# Patient Record
Sex: FEMALE | Race: WHITE | NOT HISPANIC OR LATINO
[De-identification: names, ages, dates, MRNs, and addresses within clinical notes are randomized per-mention and may not be internally consistent; named-entity substitution may affect disease eponyms.]

---

## 2017-10-31 PROBLEM — Z00.00 ENCOUNTER FOR PREVENTIVE HEALTH EXAMINATION: Status: ACTIVE | Noted: 2017-10-31

## 2017-11-17 ENCOUNTER — APPOINTMENT (OUTPATIENT)
Dept: UROGYNECOLOGY | Facility: CLINIC | Age: 82
End: 2017-11-17

## 2018-12-14 ENCOUNTER — INPATIENT (INPATIENT)
Facility: HOSPITAL | Age: 83
LOS: 3 days | Discharge: HOME | DRG: 64 | End: 2018-12-18
Attending: GENERAL ACUTE CARE HOSPITAL | Admitting: INTERNAL MEDICINE
Payer: MEDICARE

## 2018-12-14 VITALS
SYSTOLIC BLOOD PRESSURE: 193 MMHG | RESPIRATION RATE: 20 BRPM | HEIGHT: 61 IN | TEMPERATURE: 98 F | HEART RATE: 60 BPM | WEIGHT: 130.07 LBS | DIASTOLIC BLOOD PRESSURE: 83 MMHG | OXYGEN SATURATION: 96 %

## 2018-12-14 DIAGNOSIS — B34.2 CORONAVIRUS INFECTION, UNSPECIFIED: ICD-10-CM

## 2018-12-14 DIAGNOSIS — H35.30 UNSPECIFIED MACULAR DEGENERATION: ICD-10-CM

## 2018-12-14 DIAGNOSIS — R41.0 DISORIENTATION, UNSPECIFIED: ICD-10-CM

## 2018-12-14 DIAGNOSIS — K25.9 GASTRIC ULCER, UNSPECIFIED AS ACUTE OR CHRONIC, WITHOUT HEMORRHAGE OR PERFORATION: ICD-10-CM

## 2018-12-14 DIAGNOSIS — Z90.49 ACQUIRED ABSENCE OF OTHER SPECIFIED PARTS OF DIGESTIVE TRACT: Chronic | ICD-10-CM

## 2018-12-14 DIAGNOSIS — I10 ESSENTIAL (PRIMARY) HYPERTENSION: ICD-10-CM

## 2018-12-14 DIAGNOSIS — Z79.899 OTHER LONG TERM (CURRENT) DRUG THERAPY: ICD-10-CM

## 2018-12-14 DIAGNOSIS — Z90.710 ACQUIRED ABSENCE OF BOTH CERVIX AND UTERUS: Chronic | ICD-10-CM

## 2018-12-14 LAB
ALBUMIN SERPL ELPH-MCNC: 4.1 G/DL — SIGNIFICANT CHANGE UP (ref 3.3–5)
ALP SERPL-CCNC: 158 U/L — HIGH (ref 40–120)
ALT FLD-CCNC: 16 U/L — SIGNIFICANT CHANGE UP (ref 10–45)
ANION GAP SERPL CALC-SCNC: 13 MMOL/L — SIGNIFICANT CHANGE UP (ref 5–17)
APPEARANCE UR: ABNORMAL
APTT BLD: 30.1 SEC — SIGNIFICANT CHANGE UP (ref 27.5–36.3)
AST SERPL-CCNC: 18 U/L — SIGNIFICANT CHANGE UP (ref 10–40)
BASOPHILS # BLD AUTO: 0.1 K/UL — SIGNIFICANT CHANGE UP (ref 0–0.2)
BASOPHILS NFR BLD AUTO: 0.5 % — SIGNIFICANT CHANGE UP (ref 0–2)
BILIRUB SERPL-MCNC: 0.4 MG/DL — SIGNIFICANT CHANGE UP (ref 0.2–1.2)
BILIRUB UR-MCNC: NEGATIVE — SIGNIFICANT CHANGE UP
BUN SERPL-MCNC: 11 MG/DL — SIGNIFICANT CHANGE UP (ref 7–23)
CALCIUM SERPL-MCNC: 9.4 MG/DL — SIGNIFICANT CHANGE UP (ref 8.4–10.5)
CHLORIDE SERPL-SCNC: 94 MMOL/L — LOW (ref 96–108)
CO2 SERPL-SCNC: 29 MMOL/L — SIGNIFICANT CHANGE UP (ref 22–31)
COLOR SPEC: YELLOW — SIGNIFICANT CHANGE UP
CREAT SERPL-MCNC: 0.46 MG/DL — LOW (ref 0.5–1.3)
DIFF PNL FLD: NEGATIVE — SIGNIFICANT CHANGE UP
EOSINOPHIL # BLD AUTO: 0 K/UL — SIGNIFICANT CHANGE UP (ref 0–0.5)
EOSINOPHIL NFR BLD AUTO: 0.2 % — SIGNIFICANT CHANGE UP (ref 0–6)
GLUCOSE SERPL-MCNC: 130 MG/DL — HIGH (ref 70–99)
GLUCOSE UR QL: NEGATIVE — SIGNIFICANT CHANGE UP
HCOV 229E RNA SPEC QL NAA+PROBE: DETECTED
HCOV PNL SPEC NAA+PROBE: DETECTED
HCT VFR BLD CALC: 41.5 % — SIGNIFICANT CHANGE UP (ref 34.5–45)
HGB BLD-MCNC: 13.7 G/DL — SIGNIFICANT CHANGE UP (ref 11.5–15.5)
INR BLD: 1.14 RATIO — SIGNIFICANT CHANGE UP (ref 0.88–1.16)
KETONES UR-MCNC: NEGATIVE — SIGNIFICANT CHANGE UP
LACTATE BLDV-MCNC: 2.2 MMOL/L — HIGH (ref 0.7–2)
LEUKOCYTE ESTERASE UR-ACNC: NEGATIVE — SIGNIFICANT CHANGE UP
LYMPHOCYTES # BLD AUTO: 1.3 K/UL — SIGNIFICANT CHANGE UP (ref 1–3.3)
LYMPHOCYTES # BLD AUTO: 10.3 % — LOW (ref 13–44)
MCHC RBC-ENTMCNC: 25.8 PG — LOW (ref 27–34)
MCHC RBC-ENTMCNC: 33.1 GM/DL — SIGNIFICANT CHANGE UP (ref 32–36)
MCV RBC AUTO: 78.1 FL — LOW (ref 80–100)
MONOCYTES # BLD AUTO: 0.3 K/UL — SIGNIFICANT CHANGE UP (ref 0–0.9)
MONOCYTES NFR BLD AUTO: 2.6 % — SIGNIFICANT CHANGE UP (ref 2–14)
NEUTROPHILS # BLD AUTO: 10.8 K/UL — HIGH (ref 1.8–7.4)
NEUTROPHILS NFR BLD AUTO: 86.5 % — HIGH (ref 43–77)
NITRITE UR-MCNC: NEGATIVE — SIGNIFICANT CHANGE UP
PH UR: 7 — SIGNIFICANT CHANGE UP (ref 5–8)
PLATELET # BLD AUTO: 324 K/UL — SIGNIFICANT CHANGE UP (ref 150–400)
POTASSIUM SERPL-MCNC: 3.4 MMOL/L — LOW (ref 3.5–5.3)
POTASSIUM SERPL-SCNC: 3.4 MMOL/L — LOW (ref 3.5–5.3)
PROT SERPL-MCNC: 7.3 G/DL — SIGNIFICANT CHANGE UP (ref 6–8.3)
PROT UR-MCNC: ABNORMAL
PROTHROM AB SERPL-ACNC: 13.2 SEC — HIGH (ref 10–12.9)
RAPID RVP RESULT: DETECTED
RBC # BLD: 5.32 M/UL — HIGH (ref 3.8–5.2)
RBC # FLD: 14 % — SIGNIFICANT CHANGE UP (ref 10.3–14.5)
SODIUM SERPL-SCNC: 136 MMOL/L — SIGNIFICANT CHANGE UP (ref 135–145)
SP GR SPEC: 1.02 — SIGNIFICANT CHANGE UP (ref 1.01–1.02)
UROBILINOGEN FLD QL: NEGATIVE — SIGNIFICANT CHANGE UP
WBC # BLD: 12.5 K/UL — HIGH (ref 3.8–10.5)
WBC # FLD AUTO: 12.5 K/UL — HIGH (ref 3.8–10.5)

## 2018-12-14 PROCEDURE — 71250 CT THORAX DX C-: CPT | Mod: 26

## 2018-12-14 PROCEDURE — 71045 X-RAY EXAM CHEST 1 VIEW: CPT | Mod: 26

## 2018-12-14 PROCEDURE — 99285 EMERGENCY DEPT VISIT HI MDM: CPT | Mod: GC

## 2018-12-14 PROCEDURE — 99223 1ST HOSP IP/OBS HIGH 75: CPT | Mod: AI

## 2018-12-14 PROCEDURE — 70450 CT HEAD/BRAIN W/O DYE: CPT | Mod: 26

## 2018-12-14 RX ORDER — PANTOPRAZOLE SODIUM 20 MG/1
40 TABLET, DELAYED RELEASE ORAL
Qty: 0 | Refills: 0 | Status: DISCONTINUED | OUTPATIENT
Start: 2018-12-14 | End: 2018-12-18

## 2018-12-14 RX ORDER — ACETAMINOPHEN 500 MG
1000 TABLET ORAL ONCE
Qty: 0 | Refills: 0 | Status: COMPLETED | OUTPATIENT
Start: 2018-12-14 | End: 2018-12-14

## 2018-12-14 RX ORDER — POTASSIUM CHLORIDE 20 MEQ
40 PACKET (EA) ORAL ONCE
Qty: 0 | Refills: 0 | Status: COMPLETED | OUTPATIENT
Start: 2018-12-14 | End: 2018-12-14

## 2018-12-14 RX ORDER — CEFTRIAXONE 500 MG/1
1 INJECTION, POWDER, FOR SOLUTION INTRAMUSCULAR; INTRAVENOUS ONCE
Qty: 0 | Refills: 0 | Status: COMPLETED | OUTPATIENT
Start: 2018-12-14 | End: 2018-12-14

## 2018-12-14 RX ORDER — METOPROLOL TARTRATE 50 MG
50 TABLET ORAL DAILY
Qty: 0 | Refills: 0 | Status: DISCONTINUED | OUTPATIENT
Start: 2018-12-14 | End: 2018-12-18

## 2018-12-14 RX ORDER — SODIUM CHLORIDE 9 MG/ML
1850 INJECTION INTRAMUSCULAR; INTRAVENOUS; SUBCUTANEOUS ONCE
Qty: 0 | Refills: 0 | Status: COMPLETED | OUTPATIENT
Start: 2018-12-14 | End: 2018-12-14

## 2018-12-14 RX ORDER — HEPARIN SODIUM 5000 [USP'U]/ML
5000 INJECTION INTRAVENOUS; SUBCUTANEOUS EVERY 12 HOURS
Qty: 0 | Refills: 0 | Status: DISCONTINUED | OUTPATIENT
Start: 2018-12-14 | End: 2018-12-18

## 2018-12-14 RX ORDER — ACETAMINOPHEN 500 MG
650 TABLET ORAL EVERY 6 HOURS
Qty: 0 | Refills: 0 | Status: DISCONTINUED | OUTPATIENT
Start: 2018-12-14 | End: 2018-12-18

## 2018-12-14 RX ADMIN — SODIUM CHLORIDE 1850 MILLILITER(S): 9 INJECTION INTRAMUSCULAR; INTRAVENOUS; SUBCUTANEOUS at 20:11

## 2018-12-14 RX ADMIN — HEPARIN SODIUM 5000 UNIT(S): 5000 INJECTION INTRAVENOUS; SUBCUTANEOUS at 23:58

## 2018-12-14 RX ADMIN — CEFTRIAXONE 1 GRAM(S): 500 INJECTION, POWDER, FOR SOLUTION INTRAMUSCULAR; INTRAVENOUS at 19:43

## 2018-12-14 RX ADMIN — Medication 1000 MILLIGRAM(S): at 21:43

## 2018-12-14 RX ADMIN — SODIUM CHLORIDE 1850 MILLILITER(S): 9 INJECTION INTRAMUSCULAR; INTRAVENOUS; SUBCUTANEOUS at 16:04

## 2018-12-14 RX ADMIN — Medication 400 MILLIGRAM(S): at 20:14

## 2018-12-14 RX ADMIN — CEFTRIAXONE 100 GRAM(S): 500 INJECTION, POWDER, FOR SOLUTION INTRAMUSCULAR; INTRAVENOUS at 16:04

## 2018-12-14 RX ADMIN — Medication 40 MILLIEQUIVALENT(S): at 23:58

## 2018-12-14 NOTE — ED PROVIDER NOTE - OBJECTIVE STATEMENT
EM PGY1 Manuela Bui MD: 89yo with PMH of gastric ulcer, colon ca s/p colon resection, hysterectomy, HTN who presents with AMS for 2 days. As per daughter at bedside, pt has been confused, sleepy, with slurring of words, HA, chills. 2 weeks ago, similar symptoms lasting for 15 mins, 1 month ago lasting for 2 hours. Pt lives alone, normally highly functional. No facial droop, ataxia, unilateral numbness/weakness. No cough, N/V/D, syncope, hematuria, hematochezia, melena, recent sick contacts, recent travel. No complaints of pain.

## 2018-12-14 NOTE — H&P ADULT - PROBLEM SELECTOR PLAN 6
confirm meds in AM  pt uses [facility]  pt daughter would like to speak to SW in am ? more help at home confirm meds in AM  pt uses [facility] love  pt daughter would like to speak to SW in am ? more help at home  SQH for DVT prophylaxis

## 2018-12-14 NOTE — H&P ADULT - NSHPSOCIALHISTORY_GEN_ALL_CORE
lives alone, daughter lives a few blocks away  quit smoking 20 yrs ago  no etoh or illicit drugs  poor gait but declines walker/cane (often leans on a person, rarely leaves home)  + ADLs  assist with some IADLs

## 2018-12-14 NOTE — H&P ADULT - PROBLEM SELECTOR PLAN 1
pt with viral uri  improved significantly with hydration  s/p CTX but no clear bacterial infection  given cough/fever/altered mental status and crackles on exam ck CT chest to r/o bacterial PNA, if no infiltrate could monitor off abx, if + infiltrate start ctx/azithro  f/u blood cultures  pt now with improved mental status and taking good PO intake pt with viral uri  improved significantly with hydration  s/p CTX but no clear bacterial infection  given cough/fever/altered mental status and crackles on exam ck CT chest to r/o bacterial PNA, if no infiltrate could monitor off abx, if + infiltrate start ctx/azithro  f/u blood cultures  pt now with improved mental status and taking good PO intake  repeat lactate in am for trend

## 2018-12-14 NOTE — ED PROVIDER NOTE - MEDICAL DECISION MAKING DETAILS
EM PGY1 Manuela Bui MD: 89yo with PMH of gastric ulcer, colon ca s/p colon resection, hysterectomy, HTN who presents with AMS for 2 days. Pt hypertensive, febrile in ED. Altered, with crackles at bases on exam. EM PGY1 Manuela Bui MD: 89yo with PMH of gastric ulcer, colon ca s/p colon resection, hysterectomy, HTN who presents with AMS for 2 days. Pt hypertensive, febrile in ED. Altered, with crackles at bases on exam. Possible pneumonia vs UTI vs other. Plan: blood work, UA, cultures, CT head, CXR, IVF, abx

## 2018-12-14 NOTE — H&P ADULT - PMH
Bezoar, sequela    Colon cancer    Gastric ulcer    HTN (hypertension)    Macular degeneration, unspecified laterality, unspecified type

## 2018-12-14 NOTE — H&P ADULT - RS GEN PE MLT RESP DETAILS PC
airway patent/good air movement/clear to auscultation bilaterally/breath sounds equal/respirations non-labored

## 2018-12-14 NOTE — H&P ADULT - HISTORY OF PRESENT ILLNESS
89 f [place], gastric ulcer, colon ca s/p resection 2 yrs ago, htn, macular degeneration, h/o bezoar + daily dromperidone a/w worsening confusion.  Pt noted to be more confused, sleepy and slurring words.  Pt herself is able to say that she couldn't control what she wanted to say.  Pt had been sleeping most of the day, poor PO intake, was up late this week for a concert with altered sleeping pattern.  Pt admits to mild cough and mild nausea (baseline pt has frequent nausea and heart burn).  No f/c at home.  No sob/cp.  Pt urinates frequently at baseline with no change.  no diarrhea.    Daughter states she has had these periods of brief confusion in past, once with uti, ? others with mild viral illness.    Pt very functional, lives along.  Poor gait, has fallen once in the last year.      In ED initial bp 193/83 (repeat 151/87), hr 70, rr 16, sat 96, t 98.4 max 101  Given NS 1850cc, tylenol 1 g and ctx 1 g  Pt mental status improves significantly since arriving in ED  RVP + coronavirus, cxr no infiltrate, CTH no acute pathology    Med rec done with daughter:  had 1 bottle of metoprolol 50/hctz 25 daily and multiple individual pills:  dromperidone 10mg, ocuvite, zantac 150, omeprazole ? dose, zoloft ? dose

## 2018-12-14 NOTE — H&P ADULT - PROBLEM SELECTOR PLAN 2
likely 2nd infection  nonfocal and head CT no acute patholoogy  likely 2nd dehydration and illness  monitor mental status  encouraged to hydrate well  hypokalemia repleted

## 2018-12-14 NOTE — ED ADULT NURSE REASSESSMENT NOTE - NS ED NURSE REASSESS COMMENT FT1
Report received from ADAMS Garner. Pt resting comfortably with family at bedside. VSS. Awaiting lab/CT results. Safety maintained at all times, bed in lowest position, call bell in reach. Will continue to monitor closely.

## 2018-12-14 NOTE — ED PROVIDER NOTE - ATTENDING CONTRIBUTION TO CARE
waxing and waning mental status over a few day  drowsy, frail, no focal neuro changes. clear lungs, non tender abdomen, no skin changes  rectal temp shows fever, unclear source, code sepsis for wbc ams and fever. pan cx, emperic abx for poss uti (pt gets frequently) . admit for further eval.

## 2018-12-14 NOTE — ED ADULT NURSE NOTE - NSIMPLEMENTINTERV_GEN_ALL_ED
Implemented All Fall with Harm Risk Interventions:  [place] to call system. Call bell, personal items and telephone within reach. Instruct patient to call for assistance. Room bathroom lighting operational. Non-slip footwear when patient is off stretcher. Physically safe environment: no spills, clutter or unnecessary equipment. Stretcher in lowest position, wheels locked, appropriate side rails in place. Provide visual cue, wrist band, yellow gown, etc. Monitor gait and stability. Monitor for mental status changes and reorient to person, place, and time. Review medications for side effects contributing to fall risk. Reinforce activity limits and safety measures with patient and family. Provide visual clues: red socks.

## 2018-12-14 NOTE — H&P ADULT - NSHPLABSRESULTS_GEN_ALL_CORE
labs/studies/ekg reviewed personally by me  ekg NSR, QTc 474, 1 degree AVB, no ischemia  cxr no i/e  wbc 12.5, hb 13.7, plt 324  bun 11, creat 0.46, k 3.4  alk phos 158  rvp coronavirus  ua neg  lact 2.2

## 2018-12-14 NOTE — H&P ADULT - PROBLEM SELECTOR PLAN 4
pt with multiple long term GI complaints  daughter will brin dromperidone from home  cont ppi  holding on zantac for now as daughter had pill but did not remember if pt was taking this regularly

## 2018-12-14 NOTE — ED PROVIDER NOTE - PHYSICAL EXAMINATION
EM PGY1 Manuela Bui MD:   CONSTITUTIONAL: Nontoxic, well nourished, well developed,  elderly  female, resting comfortably in no acute distress  HEAD: Normocephalic; atraumatic  EYES: Normal inspection, EOMI  ENMT: External appears normal; normal oropharynx  NECK: Supple; non-tender; no cervical lymphadenopathy  CARD: RRR; no audible murmurs, rubs, or gallops  RESP: No respiratory distress, crackles at bases b/l  ABD: Soft, non-distended; non-tender; no rebound or guarding  EXT: No LE pitting edema or calf tenderness; distal pulses intact with good capillary refill  SKIN: Warm, dry, intact  NEURO: aaox2, CN II-IX intact, 5/5 strength b/l UE and LE, sensation intact in all extremities, no pronator drift

## 2018-12-14 NOTE — ED ADULT NURSE NOTE - OBJECTIVE STATEMENT
89 yr old female to Ed , accomp by daughter, c/o cough x few days with ams. Daughter states , My Mom is usually sharp, but has been consuded. Pt awake and orientedx2 Oriented to time . Resp even and nonlab Febrile Temp 101 rectal. Denies chest pain. 89 yr old female to Ed , accompanied by daughter, c/o cough x few days with ams. Daughter states , My Mom is usually sharp, but has been confused. Pt awake and orientedx2 Oriented to time . Respiration even and nonlabored. Febrile Temp 101 rectal. Denies chest pain. Denies nausea, vomiting. Denies urinary symptoms. Comfort/safety maintained.

## 2018-12-15 LAB
ALBUMIN SERPL ELPH-MCNC: 3.5 G/DL — SIGNIFICANT CHANGE UP (ref 3.3–5)
ALP SERPL-CCNC: 129 U/L — HIGH (ref 40–120)
ALT FLD-CCNC: 12 U/L — SIGNIFICANT CHANGE UP (ref 10–45)
ANION GAP SERPL CALC-SCNC: 10 MMOL/L — SIGNIFICANT CHANGE UP (ref 5–17)
ANION GAP SERPL CALC-SCNC: 12 MMOL/L — SIGNIFICANT CHANGE UP (ref 5–17)
AST SERPL-CCNC: 13 U/L — SIGNIFICANT CHANGE UP (ref 10–40)
BASOPHILS # BLD AUTO: 0.01 K/UL — SIGNIFICANT CHANGE UP (ref 0–0.2)
BASOPHILS NFR BLD AUTO: 0.2 % — SIGNIFICANT CHANGE UP (ref 0–2)
BILIRUB SERPL-MCNC: 0.3 MG/DL — SIGNIFICANT CHANGE UP (ref 0.2–1.2)
BUN SERPL-MCNC: 11 MG/DL — SIGNIFICANT CHANGE UP (ref 7–23)
BUN SERPL-MCNC: 8 MG/DL — SIGNIFICANT CHANGE UP (ref 7–23)
CALCIUM SERPL-MCNC: 8.6 MG/DL — SIGNIFICANT CHANGE UP (ref 8.4–10.5)
CALCIUM SERPL-MCNC: 8.8 MG/DL — SIGNIFICANT CHANGE UP (ref 8.4–10.5)
CHLORIDE SERPL-SCNC: 103 MMOL/L — SIGNIFICANT CHANGE UP (ref 96–108)
CHLORIDE SERPL-SCNC: 99 MMOL/L — SIGNIFICANT CHANGE UP (ref 96–108)
CO2 SERPL-SCNC: 24 MMOL/L — SIGNIFICANT CHANGE UP (ref 22–31)
CO2 SERPL-SCNC: 25 MMOL/L — SIGNIFICANT CHANGE UP (ref 22–31)
CREAT SERPL-MCNC: 0.42 MG/DL — LOW (ref 0.5–1.3)
CREAT SERPL-MCNC: 0.43 MG/DL — LOW (ref 0.5–1.3)
CULTURE RESULTS: NO GROWTH — SIGNIFICANT CHANGE UP
EOSINOPHIL # BLD AUTO: 0.09 K/UL — SIGNIFICANT CHANGE UP (ref 0–0.5)
EOSINOPHIL NFR BLD AUTO: 1.5 % — SIGNIFICANT CHANGE UP (ref 0–6)
GLUCOSE SERPL-MCNC: 89 MG/DL — SIGNIFICANT CHANGE UP (ref 70–99)
GLUCOSE SERPL-MCNC: 98 MG/DL — SIGNIFICANT CHANGE UP (ref 70–99)
HCT VFR BLD CALC: 35.7 % — SIGNIFICANT CHANGE UP (ref 34.5–45)
HGB BLD-MCNC: 11.5 G/DL — SIGNIFICANT CHANGE UP (ref 11.5–15.5)
IMM GRANULOCYTES NFR BLD AUTO: 0.2 % — SIGNIFICANT CHANGE UP (ref 0–1.5)
LACTATE SERPL-SCNC: 0.9 MMOL/L — SIGNIFICANT CHANGE UP (ref 0.7–2)
LYMPHOCYTES # BLD AUTO: 1.73 K/UL — SIGNIFICANT CHANGE UP (ref 1–3.3)
LYMPHOCYTES # BLD AUTO: 28.4 % — SIGNIFICANT CHANGE UP (ref 13–44)
MAGNESIUM SERPL-MCNC: 2.1 MG/DL — SIGNIFICANT CHANGE UP (ref 1.6–2.6)
MCHC RBC-ENTMCNC: 25.7 PG — LOW (ref 27–34)
MCHC RBC-ENTMCNC: 32.2 GM/DL — SIGNIFICANT CHANGE UP (ref 32–36)
MCV RBC AUTO: 79.7 FL — LOW (ref 80–100)
MONOCYTES # BLD AUTO: 0.53 K/UL — SIGNIFICANT CHANGE UP (ref 0–0.9)
MONOCYTES NFR BLD AUTO: 8.7 % — SIGNIFICANT CHANGE UP (ref 2–14)
NEUTROPHILS # BLD AUTO: 3.72 K/UL — SIGNIFICANT CHANGE UP (ref 1.8–7.4)
NEUTROPHILS NFR BLD AUTO: 61 % — SIGNIFICANT CHANGE UP (ref 43–77)
PHOSPHATE SERPL-MCNC: 2.9 MG/DL — SIGNIFICANT CHANGE UP (ref 2.5–4.5)
PLATELET # BLD AUTO: 285 K/UL — SIGNIFICANT CHANGE UP (ref 150–400)
POTASSIUM SERPL-MCNC: 2.8 MMOL/L — CRITICAL LOW (ref 3.5–5.3)
POTASSIUM SERPL-MCNC: 4.9 MMOL/L — SIGNIFICANT CHANGE UP (ref 3.5–5.3)
POTASSIUM SERPL-SCNC: 2.8 MMOL/L — CRITICAL LOW (ref 3.5–5.3)
POTASSIUM SERPL-SCNC: 4.9 MMOL/L — SIGNIFICANT CHANGE UP (ref 3.5–5.3)
PROT SERPL-MCNC: 6.1 G/DL — SIGNIFICANT CHANGE UP (ref 6–8.3)
RBC # BLD: 4.48 M/UL — SIGNIFICANT CHANGE UP (ref 3.8–5.2)
RBC # FLD: 15 % — HIGH (ref 10.3–14.5)
SODIUM SERPL-SCNC: 136 MMOL/L — SIGNIFICANT CHANGE UP (ref 135–145)
SODIUM SERPL-SCNC: 137 MMOL/L — SIGNIFICANT CHANGE UP (ref 135–145)
SPECIMEN SOURCE: SIGNIFICANT CHANGE UP
WBC # BLD: 6.09 K/UL — SIGNIFICANT CHANGE UP (ref 3.8–10.5)
WBC # FLD AUTO: 6.09 K/UL — SIGNIFICANT CHANGE UP (ref 3.8–10.5)

## 2018-12-15 RX ORDER — POTASSIUM CHLORIDE 20 MEQ
10 PACKET (EA) ORAL ONCE
Qty: 0 | Refills: 0 | Status: COMPLETED | OUTPATIENT
Start: 2018-12-15 | End: 2018-12-15

## 2018-12-15 RX ORDER — POTASSIUM CHLORIDE 20 MEQ
40 PACKET (EA) ORAL ONCE
Qty: 0 | Refills: 0 | Status: COMPLETED | OUTPATIENT
Start: 2018-12-15 | End: 2018-12-15

## 2018-12-15 RX ORDER — POTASSIUM CHLORIDE 20 MEQ
20 PACKET (EA) ORAL ONCE
Qty: 0 | Refills: 0 | Status: COMPLETED | OUTPATIENT
Start: 2018-12-15 | End: 2018-12-15

## 2018-12-15 RX ORDER — POTASSIUM CHLORIDE 20 MEQ
20 PACKET (EA) ORAL
Qty: 0 | Refills: 0 | Status: COMPLETED | OUTPATIENT
Start: 2018-12-15 | End: 2018-12-15

## 2018-12-15 RX ORDER — AZITHROMYCIN 500 MG/1
250 TABLET, FILM COATED ORAL DAILY
Qty: 0 | Refills: 0 | Status: DISCONTINUED | OUTPATIENT
Start: 2018-12-15 | End: 2018-12-18

## 2018-12-15 RX ADMIN — HEPARIN SODIUM 5000 UNIT(S): 5000 INJECTION INTRAVENOUS; SUBCUTANEOUS at 21:43

## 2018-12-15 RX ADMIN — Medication 50 MILLIGRAM(S): at 05:31

## 2018-12-15 RX ADMIN — Medication 100 MILLIEQUIVALENT(S): at 11:08

## 2018-12-15 RX ADMIN — Medication 40 MILLIEQUIVALENT(S): at 09:18

## 2018-12-15 RX ADMIN — Medication 20 MILLIEQUIVALENT(S): at 11:05

## 2018-12-15 RX ADMIN — Medication 20 MILLIEQUIVALENT(S): at 11:06

## 2018-12-15 RX ADMIN — AZITHROMYCIN 250 MILLIGRAM(S): 500 TABLET, FILM COATED ORAL at 14:17

## 2018-12-15 RX ADMIN — PANTOPRAZOLE SODIUM 40 MILLIGRAM(S): 20 TABLET, DELAYED RELEASE ORAL at 05:31

## 2018-12-15 RX ADMIN — Medication 20 MILLIEQUIVALENT(S): at 14:19

## 2018-12-15 RX ADMIN — HEPARIN SODIUM 5000 UNIT(S): 5000 INJECTION INTRAVENOUS; SUBCUTANEOUS at 09:19

## 2018-12-15 NOTE — CHART NOTE - NSCHARTNOTEFT_GEN_A_CORE
MARIA G BENITES    Notified by RN that patient and family's want to talk to me. Seen pt and family at bedside who would like to d/c home in the morning.     Changed attending from Dr. Grijalva to Dr. Jarvis as per PCP    D/W Dr Jarvis and would like to see pt in the morning.                                Alon Mcmillan ANP-BC  Spectralink #[id number]

## 2018-12-15 NOTE — PROGRESS NOTE ADULT - ASSESSMENT
89 f a/w viral URI /  corona  virus positive,   cxr,  normal  h/o htn. confusion, resolving   follow  wbc  hypokalemia  ct chest  dvt ppx  PT  eval

## 2018-12-15 NOTE — PROGRESS NOTE ADULT - SUBJECTIVE AND OBJECTIVE BOX
no  cp/sob    REVIEW OF SYSTEMS:  GEN: no fever,    no chills  RESP: no SOB,   no cough  CVS: no chest pain,   no palpitations  GI: no abdominal pain,   no nausea,   no vomiting,   no constipation,   no diarrhea  : no dysuria,   no frequency  NEURO: no headache,   no dizziness  PSYCH: no depression,   not anxious  Derm : no rash    MEDICATIONS  (STANDING):  heparin  Injectable 5000 Unit(s) SubCutaneous every 12 hours  metoprolol succinate ER 50 milliGRAM(s) Oral daily  pantoprazole    Tablet 40 milliGRAM(s) Oral before breakfast    MEDICATIONS  (PRN):  acetaminophen   Tablet .. 650 milliGRAM(s) Oral every 6 hours PRN Temp greater or equal to 38C (100.4F), Mild Pain (1 - 3), Moderate Pain (4 - 6)      Vital Signs Last 24 Hrs  T(C): 36.7 (15 Dec 2018 05:26), Max: 38.3 (14 Dec 2018 15:23)  T(F): 98.1 (15 Dec 2018 05:26), Max: 101 (14 Dec 2018 15:23)  HR: 66 (15 Dec 2018 05:26) (55 - 70)  BP: 176/85 (15 Dec 2018 07:02) (151/87 - 196/80)  BP(mean): --  RR: 18 (15 Dec 2018 05:26) (16 - 20)  SpO2: 94% (15 Dec 2018 05:26) (94% - 98%)  CAPILLARY BLOOD GLUCOSE        I&O's Summary      PHYSICAL EXAM:  HEAD:  Atraumatic, Normocephalic  NECK: Supple, No   JVD  CHEST/LUNG:   no     rales,     no,    rhonchi  HEART: Regular rate and rhythm;         murmur  ABDOMEN: Soft, Nontender, ;   EXTREMITIES:    no   edema  NEUROLOGY:  alert    LABS:                        13.7   12.5  )-----------( 324      ( 14 Dec 2018 15:29 )             41.5     12-15    136  |  99  |  8   ----------------------------<  98  2.8<LL>   |  25  |  0.42<L>    Ca    8.6      15 Dec 2018 06:53  Phos  2.9     12-15  Mg     2.1     12-15    TPro  6.1  /  Alb  3.5  /  TBili  0.3  /  DBili  x   /  AST  13  /  ALT  12  /  AlkPhos  129<H>  12-15    PT/INR - ( 14 Dec 2018 15:29 )   PT: 13.2 sec;   INR: 1.14 ratio         PTT - ( 14 Dec 2018 15:29 )  PTT:30.1 sec      Urinalysis Basic - ( 14 Dec 2018 16:27 )    Color: Yellow / Appearance: Slightly Turbid / S.024 / pH: x  Gluc: x / Ketone: Negative  / Bili: Negative / Urobili: Negative   Blood: x / Protein: 30 mg/dL / Nitrite: Negative   Leuk Esterase: Negative / RBC: 7 /hpf / WBC 3 /hpf   Sq Epi: x / Non Sq Epi: 13 /hpf / Bacteria: Negative      Lactate, Blood: 0.9 mmol/L (12-15 @ 06:41)                  Consultant(s) Notes Reviewed:      Care Discussed with Consultants/Other Providers:

## 2018-12-16 ENCOUNTER — TRANSCRIPTION ENCOUNTER (OUTPATIENT)
Age: 83
End: 2018-12-16

## 2018-12-16 LAB
ANION GAP SERPL CALC-SCNC: 10 MMOL/L — SIGNIFICANT CHANGE UP (ref 5–17)
BASOPHILS # BLD AUTO: 0.03 K/UL — SIGNIFICANT CHANGE UP (ref 0–0.2)
BASOPHILS NFR BLD AUTO: 0.5 % — SIGNIFICANT CHANGE UP (ref 0–2)
BUN SERPL-MCNC: 10 MG/DL — SIGNIFICANT CHANGE UP (ref 7–23)
CALCIUM SERPL-MCNC: 9 MG/DL — SIGNIFICANT CHANGE UP (ref 8.4–10.5)
CHLORIDE SERPL-SCNC: 103 MMOL/L — SIGNIFICANT CHANGE UP (ref 96–108)
CO2 SERPL-SCNC: 25 MMOL/L — SIGNIFICANT CHANGE UP (ref 22–31)
CREAT SERPL-MCNC: 0.51 MG/DL — SIGNIFICANT CHANGE UP (ref 0.5–1.3)
EOSINOPHIL # BLD AUTO: 0.19 K/UL — SIGNIFICANT CHANGE UP (ref 0–0.5)
EOSINOPHIL NFR BLD AUTO: 3.2 % — SIGNIFICANT CHANGE UP (ref 0–6)
GLUCOSE SERPL-MCNC: 97 MG/DL — SIGNIFICANT CHANGE UP (ref 70–99)
HCT VFR BLD CALC: 35.9 % — SIGNIFICANT CHANGE UP (ref 34.5–45)
HGB BLD-MCNC: 11.3 G/DL — LOW (ref 11.5–15.5)
IMM GRANULOCYTES NFR BLD AUTO: 0.3 % — SIGNIFICANT CHANGE UP (ref 0–1.5)
LYMPHOCYTES # BLD AUTO: 1.6 K/UL — SIGNIFICANT CHANGE UP (ref 1–3.3)
LYMPHOCYTES # BLD AUTO: 26.7 % — SIGNIFICANT CHANGE UP (ref 13–44)
MCHC RBC-ENTMCNC: 25.3 PG — LOW (ref 27–34)
MCHC RBC-ENTMCNC: 31.5 GM/DL — LOW (ref 32–36)
MCV RBC AUTO: 80.3 FL — SIGNIFICANT CHANGE UP (ref 80–100)
MONOCYTES # BLD AUTO: 0.55 K/UL — SIGNIFICANT CHANGE UP (ref 0–0.9)
MONOCYTES NFR BLD AUTO: 9.2 % — SIGNIFICANT CHANGE UP (ref 2–14)
NEUTROPHILS # BLD AUTO: 3.6 K/UL — SIGNIFICANT CHANGE UP (ref 1.8–7.4)
NEUTROPHILS NFR BLD AUTO: 60.1 % — SIGNIFICANT CHANGE UP (ref 43–77)
PLATELET # BLD AUTO: 276 K/UL — SIGNIFICANT CHANGE UP (ref 150–400)
POTASSIUM SERPL-MCNC: 4.4 MMOL/L — SIGNIFICANT CHANGE UP (ref 3.5–5.3)
POTASSIUM SERPL-SCNC: 4.4 MMOL/L — SIGNIFICANT CHANGE UP (ref 3.5–5.3)
RBC # BLD: 4.47 M/UL — SIGNIFICANT CHANGE UP (ref 3.8–5.2)
RBC # FLD: 14.8 % — HIGH (ref 10.3–14.5)
SODIUM SERPL-SCNC: 138 MMOL/L — SIGNIFICANT CHANGE UP (ref 135–145)
WBC # BLD: 5.99 K/UL — SIGNIFICANT CHANGE UP (ref 3.8–10.5)
WBC # FLD AUTO: 5.99 K/UL — SIGNIFICANT CHANGE UP (ref 3.8–10.5)

## 2018-12-16 RX ADMIN — Medication 50 MILLIGRAM(S): at 05:16

## 2018-12-16 RX ADMIN — HEPARIN SODIUM 5000 UNIT(S): 5000 INJECTION INTRAVENOUS; SUBCUTANEOUS at 23:26

## 2018-12-16 RX ADMIN — PANTOPRAZOLE SODIUM 40 MILLIGRAM(S): 20 TABLET, DELAYED RELEASE ORAL at 05:16

## 2018-12-16 RX ADMIN — Medication 650 MILLIGRAM(S): at 20:23

## 2018-12-16 RX ADMIN — AZITHROMYCIN 250 MILLIGRAM(S): 500 TABLET, FILM COATED ORAL at 11:54

## 2018-12-16 RX ADMIN — HEPARIN SODIUM 5000 UNIT(S): 5000 INJECTION INTRAVENOUS; SUBCUTANEOUS at 11:54

## 2018-12-16 RX ADMIN — Medication 650 MILLIGRAM(S): at 20:30

## 2018-12-16 NOTE — DISCHARGE NOTE ADULT - PATIENT PORTAL LINK FT
You can access the [facility] Patient Portal, offered by [facility], by registering with the following website: [email or web address]

## 2018-12-16 NOTE — DISCHARGE NOTE ADULT - ADDITIONAL INSTRUCTIONS
Follow-up with your primary care physician within 1 week. Call for appointment. Follow-up with your primary care physician/ Cardiologist within 1 week. Call for appointment.  Follow up with Neurologist and Cardiologist as an outpatient.   Repeat CT chest within 2-3 months.  Follow up with Hematologist regarding osseous changes noted on MRI.

## 2018-12-16 NOTE — PHYSICAL THERAPY INITIAL EVALUATION ADULT - ADDITIONAL COMMENTS
Pt lives alone in an apt with elevator access. Pt was amb without AD but has a rollator. Pt was Ind with all ADLs and has a HHA 2x/week x 3hrs to assist with grocery shopping. Pt lives alone in an apt with elevator access. Pt was amb without AD but has a rollator. Pt was Ind with all ADLs and has a HHA 2x/week x 3hrs to assist with grocery shopping. Pt's family live down the block and visit often.

## 2018-12-16 NOTE — DISCHARGE NOTE ADULT - CARE PLAN
Principal Discharge DX:	Confusion  Goal:	Resolved  Assessment and plan of treatment:	Maintain safety, adequate hydration, nutrition, rest and activity as tolerated.   Follow-up with your primary care physician within 1 week. Call for appointment.  Secondary Diagnosis:	Coronavirus infection  Assessment and plan of treatment:	Maintain safety, adequate hydration, nutrition, rest and activity as tolerated.   Maintain good hand-washing.   Avoid contact with those who are immuno-compromised.  Follow-up with your primary care physician within 1 week. Call for appointment.  Secondary Diagnosis:	Hypertension, unspecified type  Assessment and plan of treatment:	Low salt diet  Activity as tolerated.  Take all medication as prescribed.  Follow up with your medical doctor for routine blood pressure monitoring at your next visit.  Notify your doctor if you have any of the following symptoms:   Dizziness, Lightheadedness, Blurry vision, Headache, Chest pain, Shortness of breath  Secondary Diagnosis:	Macular degeneration, unspecified laterality, unspecified type  Assessment and plan of treatment:	Follow-up with your primary care physician within 1 week. Call for appointment. Principal Discharge DX:	Confusion  Goal:	Resolved  Assessment and plan of treatment:	Maintain safety, adequate hydration, nutrition, rest and activity as tolerated.   Follow-up with your primary care physician within 1 week. Call for appointment.  Secondary Diagnosis:	Coronavirus infection  Assessment and plan of treatment:	Maintain safety, adequate hydration, nutrition, rest and activity as tolerated.   Maintain good hand-washing.   Avoid contact with those who are immuno-compromised.  Follow-up with your primary care physician within 1 week. Call for appointment.  Secondary Diagnosis:	Hypertension, unspecified type  Assessment and plan of treatment:	Low salt diet  Activity as tolerated.  Take all medication as prescribed.  Follow up with your medical doctor for routine blood pressure monitoring at your next visit.  Notify your doctor if you have any of the following symptoms:   Dizziness, Lightheadedness, Blurry vision, Headache, Chest pain, Shortness of breath  Secondary Diagnosis:	Macular degeneration, unspecified laterality, unspecified type  Assessment and plan of treatment:	Follow-up with your primary care physician within 1 week. Call for appointment.  Secondary Diagnosis:	Pulmonary nodule  Assessment and plan of treatment:	Outpatient CT chest within 2-3 months.  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  Follow up with Neurologist an outpatient.

## 2018-12-16 NOTE — DISCHARGE NOTE ADULT - HOSPITAL COURSE
89 yo female with PMH of [place], gastric ulcer, colon cancer s/p resection 2 yrs ago,HTN,  macular degeneration, bezoar + daily dromperidone , who presented with worsening confusion , accompanied with cough , fever , and crackles on exam.  Head CT  (12.14.18 @ 16:44) shows No acute intracranial hemorrhage, mass effect, vasogenic edema, or evidence of acute territorial infarct. RVP  positive for  Coronavirus . CT chest A few clustered nodules in the superior right lower lobe and posterior right upper lobe may be mucoid impaction, infectious, or inflammatory. No lung consolidation. Left upper lobe 7 mm nodule. Right upper lobe 4 mm groundglass nodule.3 month follow-up CT is recommended for complete evaluation. 89 yo female with PMH of [place], gastric ulcer, colon cancer s/p resection 2 yrs ago,HTN,  macular degeneration, bezoar + daily dromperidone , who presented with worsening confusion , accompanied with cough , fever , and crackles on exam.  Head CT  (12.14.18 @ 16:44) shows No acute intracranial hemorrhage, mass effect, vasogenic edema, or evidence of acute territorial infarct. RVP  positive for  Coronavirus . CT chest A few clustered nodules in the superior right lower lobe and posterior right upper lobe may be mucoid impaction, infectious, or inflammatory. No lung consolidation. Left upper lobe 7 mm nodule. Right upper lobe 4 mm groundglass nodule.3 month follow-up CT is recommended for complete evaluation.  Pt confusion is resolving, but complains of generalized weakness, requesting increased help at home. PT : 89 yo female with PMH of [place], gastric ulcer, colon cancer s/p resection 2 yrs ago,HTN,  macular degeneration, bezoar + daily dromperidone , who presented with worsening confusion , accompanied with cough , fever , and crackles on exam.  Head CT  (12.14.18 @ 16:44) shows No acute intracranial hemorrhage, mass effect, vasogenic edema, or evidence of acute territorial infarct. RVP  positive for  Coronavirus . CT chest A few clustered nodules in the superior right lower lobe and posterior right upper lobe may be mucoid impaction, infectious, or inflammatory. No lung consolidation. Left upper lobe 7 mm nodule. Right upper lobe 4 mm groundglass nodule.3 month follow-up CT is recommended for complete evaluation.  Pt confusion is resolving, but complains of generalized weakness, requesting increased help at home. 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 Punctate acute infarct in the left parietotemporal region. Multiple small meningiomas as detailed in the body the report.  Lipitor and Aspirin started.      ossesous mets on MRI : onc input as an outpatient.    Pt stable dc home with outpt follow up with PMD, Neurologist, Cardiologist and Hematologist.

## 2018-12-16 NOTE — DISCHARGE NOTE ADULT - CARE PROVIDER_API CALL
Ilia Gyu), Internal Medicine  [address] [place] [address]  Phone: [phone]  Fax: [phone] Ilia Guy), Internal Medicine  [address] [place] [address]  Phone: [phone]  Fax: [phone]    Talat Olguin), Electrodiagnostic Medicine; Neurology  [address] [facility]  [address]  [place] [address]  Phone: [phone]  Fax: [phone]    Edward Manley (MD), Cardiovascular Disease  [address] [facility] [facility]  [address]  [place], [place] [id number]  Phone: [phone]  Fax: [phone]    Farhat Hunter), Hematology; Medical Oncology  [address] [facility] [facility]  [address]  [place] [address]  Phone: [phone]  Fax: [phone]

## 2018-12-16 NOTE — DISCHARGE NOTE ADULT - PROVIDER TOKENS
TOKNANCY:'[id number]' TOKEN:'[id number]:'[id number]:'[id number]:'[id number]'

## 2018-12-16 NOTE — PROGRESS NOTE ADULT - ASSESSMENT
89 f [place], gastric ulcer, colon ca s/p resection 2 yrs ago, htn, macular degeneration, h/o bezoar admitted with one episode of "slurred speech " , difficulty with word findings and  confusion found to have respiratroy infection with possible pna on CT    1- Slurred speech / confusion : now at her baseline    transfer to tele   will chekc MRI , B12 foate and tsh   echo   neuro and cardio input   will consider anti plt however with hx of bleeding ulcers reuiring surgery ?...  will need to consider     2- presumed sepsis : cont abx   check procal     3- HTN: not well controlled   consider adjustin meds if cont to be elevated

## 2018-12-16 NOTE — DISCHARGE NOTE ADULT - MEDICATION SUMMARY - MEDICATIONS TO TAKE
I will START or STAY ON the medications listed below when I get home from the hospital:    domperidone ([place])  -- 1 tab(s) by mouth 3 times a day  -- Indication: For Nausea    aspirin 81 mg oral delayed release tablet  -- 1 tab(s) by mouth once a day  -- Indication: For CAD    losartan 50 mg oral tablet  -- 1 tab(s) by mouth once a day  -- Indication: For Blood Pressure    sertraline 25 mg oral tablet  -- 1 tab(s) by mouth once a day  -- Indication: For Depression    atorvastatin 10 mg oral tablet  -- 1 tab(s) by mouth once a day (at bedtime)  -- Indication: For High Cholesterol    metoprolol succinate 50 mg oral tablet, extended release  -- 1 tab(s) by mouth once a day  -- Indication: For Blood Pressure    amLODIPine 5 mg oral tablet  -- 1 tab(s) by mouth once a day  -- Indication: For Blood Pressure    Zantac  -- Indication: For Stomach    omeprazole 20 mg oral delayed release capsule  -- 1 cap(s) by mouth once a day  -- Indication: For Stomach    Premarin 0.625 mg/g vaginal cream with applicator  -- 1 gram(s) vaginally 3 times a week  -- Indication: For Hormone    Ocuvite oral tablet  -- 1 tab(s) by mouth 2 times a day  -- Indication: For Vitamin    cyanocobalamin 1000 mcg oral tablet  -- 1 tab(s) by mouth once a day  -- Indication: For Vitamin

## 2018-12-16 NOTE — PHYSICAL THERAPY INITIAL EVALUATION ADULT - PERTINENT HX OF CURRENT PROBLEM, REHAB EVAL
Pt is a 89 y/o female admitted to [facility] on 12/14/18  worsening confusion.  Pt herself is able to say that she couldn't control what she wanted to say.  Pt had been sleeping most of the day, poor PO intake, was up late this week for a concert with altered sleeping pattern. Daughter states she has had these periods of brief confusion in past, once with uti, ? others with mild viral illness. Pt is a 89 y/o female admitted to [facility] on 12/14/18  worsening confusion.  Pt herself is able to say that she couldn't control what she wanted to say.  Pt had been sleeping most of the day, poor PO intake, was up late this week for a concert with altered sleeping pattern. Daughter states she has had these periods of brief confusion in past, once with uti, ? others with mild viral illness. (-) CT head.

## 2018-12-16 NOTE — PROGRESS NOTE ADULT - SUBJECTIVE AND OBJECTIVE BOX
Patient is a 89y old  Female who presents with a chief complaint of confusion (16 Dec 2018 13:22)                                                               INTERVAL HPI/OVERNIGHT EVENTS:    REVIEW OF SYSTEMS:     CONSTITUTIONAL: No weakness, fevers or chills  EYES/ENT: No visual changes , no ear ache   NECK: No pain or stiffness  RESPIRATORY: No cough, wheezing,  No shortness of breath  CARDIOVASCULAR: No chest pain or palpitations  GASTROINTESTINAL: No abdominal pain  . No nausea, vomiting, or hematemesis; No diarrhea or constipation. No melena or hematochezia.  GENITOURINARY: No dysuria, frequency or hematuria  NEUROLOGICAL: No numbness or weakness  SKIN: No itching, burning, rashes, or lesions                                                                                                                                                                                                                                                                                 Medications:  MEDICATIONS  (STANDING):  azithromycin   Tablet 250 milliGRAM(s) Oral daily  heparin  Injectable 5000 Unit(s) SubCutaneous every 12 hours  metoprolol succinate ER 50 milliGRAM(s) Oral daily  pantoprazole    Tablet 40 milliGRAM(s) Oral before breakfast    MEDICATIONS  (PRN):  acetaminophen   Tablet .. 650 milliGRAM(s) Oral every 6 hours PRN Temp greater or equal to 38C (100.4F), Mild Pain (1 - 3), Moderate Pain (4 - 6)       Allergies    No Known Allergies    Intolerances      Vital Signs Last 24 Hrs  T(C): 36.7 (16 Dec 2018 18:42), Max: 37.1 (15 Dec 2018 20:05)  T(F): 98 (16 Dec 2018 18:42), Max: 98.8 (15 Dec 2018 20:05)  HR: 61 (16 Dec 2018 18:42) (56 - 74)  BP: 182/82 (16 Dec 2018 18:42) (162/72 - 182/82)  BP(mean): --  RR: 16 (16 Dec 2018 18:42) (16 - 18)  SpO2: 100% (16 Dec 2018 18:42) (96% - 100%)  CAPILLARY BLOOD GLUCOSE          12-15 @ 07:01  -  12-16 @ 07:00  --------------------------------------------------------  IN: 420 mL / OUT: 0 mL / NET: 420 mL    12-16 @ 07:01  -  12-16 @ 19:09  --------------------------------------------------------  IN: 240 mL / OUT: 0 mL / NET: 240 mL      Physical Exam:    Daily     Daily   General:  Well appearing, NAD, not cachetic  HEENT:  Nonicteric, PERRLA  CV:  RRR, S1S2   Lungs:  CTA B/L, no wheezes, rales, rhonchi  Abdomen:  Soft, non-tender, no distended, positive BS  Extremities:  2+ pulses, no c/c, no edema  Skin:  Warm and dry, no rashes  :  No mora  Neuro:  AAOx3, non-focal, grossly intact                                                                                                                                                                                                                                                                                                LABS:                               11.3   5.99  )-----------( 276      ( 16 Dec 2018 10:58 )             35.9                      12-16    138  |  103  |  10  ----------------------------<  97  4.4   |  25  |  0.51    Ca    9.0      16 Dec 2018 07:27  Phos  2.9     12-15  Mg     2.1     12-15    TPro  6.1  /  Alb  3.5  /  TBili  0.3  /  DBili  x   /  AST  13  /  ALT  12  /  AlkPhos  129<H>  12-15                       RADIOLOGY & ADDITIONAL TESTS         I personally reviewed: [  ]EKG   [  ]CXR    [  ] CT      A/P:         Discussed with :     [facility] consultants' Notes   Time spent : Patient is a 89y old  Female who presents with a chief complaint of confusion (16 Dec 2018 13:22)                                                               INTERVAL HPI/OVERNIGHT EVENTS:    REVIEW OF SYSTEMS:     CONSTITUTIONAL: No weakness, fevers or chills  EYES/ENT: No visual changes , no ear ache   NECK: No pain or stiffness  RESPIRATORY: No cough, wheezing,  No shortness of breath  CARDIOVASCULAR: No chest pain or palpitations  GASTROINTESTINAL: No abdominal pain  . No nausea, vomiting, or hematemesis; No diarrhea or constipation. No melena or hematochezia.  GENITOURINARY: No dysuria, frequency or hematuria  NEUROLOGICAL: No numbness or weakness  SKIN: No itching, burning, rashes, or lesions                                                                                                                                                                                                                                                                                 Medications:  MEDICATIONS  (STANDING):  azithromycin   Tablet 250 milliGRAM(s) Oral daily  heparin  Injectable 5000 Unit(s) SubCutaneous every 12 hours  metoprolol succinate ER 50 milliGRAM(s) Oral daily  pantoprazole    Tablet 40 milliGRAM(s) Oral before breakfast    MEDICATIONS  (PRN):  acetaminophen   Tablet .. 650 milliGRAM(s) Oral every 6 hours PRN Temp greater or equal to 38C (100.4F), Mild Pain (1 - 3), Moderate Pain (4 - 6)       Allergies    No Known Allergies    Intolerances      Vital Signs Last 24 Hrs  T(C): 36.7 (16 Dec 2018 18:42), Max: 37.1 (15 Dec 2018 20:05)  T(F): 98 (16 Dec 2018 18:42), Max: 98.8 (15 Dec 2018 20:05)  HR: 61 (16 Dec 2018 18:42) (56 - 74)  BP: 182/82 (16 Dec 2018 18:42) (162/72 - 182/82)  BP(mean): --  RR: 16 (16 Dec 2018 18:42) (16 - 18)  SpO2: 100% (16 Dec 2018 18:42) (96% - 100%)  CAPILLARY BLOOD GLUCOSE          12-15 @ 07:01  -  12-16 @ 07:00  --------------------------------------------------------  IN: 420 mL / OUT: 0 mL / NET: 420 mL    12-16 @ 07:01  -  12-16 @ 19:09  --------------------------------------------------------  IN: 240 mL / OUT: 0 mL / NET: 240 mL      Physical Exam:    General:  Well appearing, NAD, not cachetic  HEENT:  Nonicteric, PERRLA  CV:  RRR, S1S2   Lungs:  CTA  Abdomen:  Soft, non-tender, no distended, positive BS  Extremities:  no edema  Skin:  Warm and dry, no rashes  :  No mora  Neuro:  AAOx3, non-focal, grossly intact                                                                                                                                                                                                                                                                                                LABS:                               11.3   5.99  )-----------( 276      ( 16 Dec 2018 10:58 )             35.9                      12-16    138  |  103  |  10  ----------------------------<  97  4.4   |  25  |  0.51    Ca    9.0      16 Dec 2018 07:27  Phos  2.9     12-15  Mg     2.1     12-15    TPro  6.1  /  Alb  3.5  /  TBili  0.3  /  DBili  x   /  AST  13  /  ALT  12  /  AlkPhos  129<H>  12-15

## 2018-12-16 NOTE — DISCHARGE NOTE ADULT - PLAN OF CARE
Resolved Maintain safety, adequate hydration, nutrition, rest and activity as tolerated.   Follow-up with your primary care physician within 1 week. Call for appointment. Maintain safety, adequate hydration, nutrition, rest and activity as tolerated.   Maintain good hand-washing.   Avoid contact with those who are immuno-compromised.  Follow-up with your primary care physician within 1 week. Call for appointment. Low salt diet  Activity as tolerated.  Take all medication as prescribed.  Follow up with your medical doctor for routine blood pressure monitoring at your next visit.  Notify your doctor if you have any of the following symptoms:   Dizziness, Lightheadedness, Blurry vision, Headache, Chest pain, Shortness of breath Follow-up with your primary care physician within 1 week. Call for appointment. Outpatient CT chest within 2-3 months. 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  Follow up with Neurologist an outpatient.

## 2018-12-16 NOTE — DISCHARGE NOTE ADULT - SECONDARY DIAGNOSIS.
Coronavirus infection Hypertension, unspecified type Macular degeneration, unspecified laterality, unspecified type Pulmonary nodule Stroke

## 2018-12-17 LAB
ANION GAP SERPL CALC-SCNC: 12 MMOL/L — SIGNIFICANT CHANGE UP (ref 5–17)
BUN SERPL-MCNC: 10 MG/DL — SIGNIFICANT CHANGE UP (ref 7–23)
CALCIUM SERPL-MCNC: 9.4 MG/DL — SIGNIFICANT CHANGE UP (ref 8.4–10.5)
CHLORIDE SERPL-SCNC: 101 MMOL/L — SIGNIFICANT CHANGE UP (ref 96–108)
CHOLEST SERPL-MCNC: 159 MG/DL — SIGNIFICANT CHANGE UP (ref 10–199)
CHOLEST SERPL-MCNC: 169 MG/DL — SIGNIFICANT CHANGE UP (ref 10–199)
CK MB BLD-MCNC: 7.3 % — HIGH (ref 0–3.5)
CK MB CFR SERPL CALC: 3 NG/ML — SIGNIFICANT CHANGE UP (ref 0–3.8)
CK SERPL-CCNC: 41 U/L — SIGNIFICANT CHANGE UP (ref 25–170)
CO2 SERPL-SCNC: 25 MMOL/L — SIGNIFICANT CHANGE UP (ref 22–31)
CREAT SERPL-MCNC: 0.51 MG/DL — SIGNIFICANT CHANGE UP (ref 0.5–1.3)
FOLATE SERPL-MCNC: 12.4 NG/ML — SIGNIFICANT CHANGE UP
GLUCOSE SERPL-MCNC: 94 MG/DL — SIGNIFICANT CHANGE UP (ref 70–99)
HCT VFR BLD CALC: 37.7 % — SIGNIFICANT CHANGE UP (ref 34.5–45)
HDLC SERPL-MCNC: 47 MG/DL — LOW
HDLC SERPL-MCNC: 55 MG/DL — SIGNIFICANT CHANGE UP
HGB BLD-MCNC: 12 G/DL — SIGNIFICANT CHANGE UP (ref 11.5–15.5)
LIPID PNL WITH DIRECT LDL SERPL: 76 MG/DL — SIGNIFICANT CHANGE UP
LIPID PNL WITH DIRECT LDL SERPL: 80 MG/DL — SIGNIFICANT CHANGE UP
MAGNESIUM SERPL-MCNC: 2 MG/DL — SIGNIFICANT CHANGE UP (ref 1.6–2.6)
MCHC RBC-ENTMCNC: 25.7 PG — LOW (ref 27–34)
MCHC RBC-ENTMCNC: 31.8 GM/DL — LOW (ref 32–36)
MCV RBC AUTO: 80.7 FL — SIGNIFICANT CHANGE UP (ref 80–100)
PLATELET # BLD AUTO: 299 K/UL — SIGNIFICANT CHANGE UP (ref 150–400)
POTASSIUM SERPL-MCNC: 4.2 MMOL/L — SIGNIFICANT CHANGE UP (ref 3.5–5.3)
POTASSIUM SERPL-SCNC: 4.2 MMOL/L — SIGNIFICANT CHANGE UP (ref 3.5–5.3)
PROCALCITONIN SERPL-MCNC: 0.02 NG/ML — SIGNIFICANT CHANGE UP (ref 0.02–0.1)
PROCALCITONIN SERPL-MCNC: 0.05 NG/ML — SIGNIFICANT CHANGE UP (ref 0.02–0.1)
RBC # BLD: 4.67 M/UL — SIGNIFICANT CHANGE UP (ref 3.8–5.2)
RBC # FLD: 15.1 % — HIGH (ref 10.3–14.5)
SODIUM SERPL-SCNC: 138 MMOL/L — SIGNIFICANT CHANGE UP (ref 135–145)
TOTAL CHOLESTEROL/HDL RATIO MEASUREMENT: 3.1 RATIO — LOW (ref 3.3–7.1)
TOTAL CHOLESTEROL/HDL RATIO MEASUREMENT: 3.4 RATIO — SIGNIFICANT CHANGE UP (ref 3.3–7.1)
TRIGL SERPL-MCNC: 160 MG/DL — HIGH (ref 10–149)
TRIGL SERPL-MCNC: 188 MG/DL — HIGH (ref 10–149)
TROPONIN T, HIGH SENSITIVITY RESULT: 11 NG/L — SIGNIFICANT CHANGE UP (ref 0–51)
TSH SERPL-MCNC: 3.82 UIU/ML — SIGNIFICANT CHANGE UP (ref 0.27–4.2)
VIT B12 SERPL-MCNC: 232 PG/ML — SIGNIFICANT CHANGE UP (ref 232–1245)
WBC # BLD: 7.06 K/UL — SIGNIFICANT CHANGE UP (ref 3.8–10.5)
WBC # FLD AUTO: 7.06 K/UL — SIGNIFICANT CHANGE UP (ref 3.8–10.5)

## 2018-12-17 PROCEDURE — 70553 MRI BRAIN STEM W/O & W/DYE: CPT | Mod: 26

## 2018-12-17 PROCEDURE — 93010 ELECTROCARDIOGRAM REPORT: CPT

## 2018-12-17 RX ORDER — HYDRALAZINE HCL 50 MG
10 TABLET ORAL ONCE
Qty: 0 | Refills: 0 | Status: COMPLETED | OUTPATIENT
Start: 2018-12-17 | End: 2018-12-17

## 2018-12-17 RX ORDER — LOSARTAN POTASSIUM 100 MG/1
50 TABLET, FILM COATED ORAL DAILY
Qty: 0 | Refills: 0 | Status: DISCONTINUED | OUTPATIENT
Start: 2018-12-17 | End: 2018-12-18

## 2018-12-17 RX ORDER — OMEPRAZOLE 10 MG/1
0 CAPSULE, DELAYED RELEASE ORAL
Qty: 0 | Refills: 0 | COMMUNITY

## 2018-12-17 RX ORDER — AMLODIPINE BESYLATE 2.5 MG/1
5 TABLET ORAL DAILY
Qty: 0 | Refills: 0 | Status: DISCONTINUED | OUTPATIENT
Start: 2018-12-17 | End: 2018-12-18

## 2018-12-17 RX ORDER — SERTRALINE 25 MG/1
0 TABLET, FILM COATED ORAL
Qty: 0 | Refills: 0 | COMMUNITY

## 2018-12-17 RX ORDER — MULTIVIT-MIN/FERROUS GLUCONATE 9 MG/15 ML
0 LIQUID (ML) ORAL
Qty: 0 | Refills: 0 | COMMUNITY

## 2018-12-17 RX ORDER — ONDANSETRON 8 MG/1
4 TABLET, FILM COATED ORAL ONCE
Qty: 0 | Refills: 0 | Status: COMPLETED | OUTPATIENT
Start: 2018-12-17 | End: 2018-12-17

## 2018-12-17 RX ORDER — METOPROLOL TARTRATE 50 MG
1 TABLET ORAL
Qty: 0 | Refills: 0 | COMMUNITY

## 2018-12-17 RX ADMIN — Medication 650 MILLIGRAM(S): at 12:45

## 2018-12-17 RX ADMIN — AZITHROMYCIN 250 MILLIGRAM(S): 500 TABLET, FILM COATED ORAL at 11:12

## 2018-12-17 RX ADMIN — HEPARIN SODIUM 5000 UNIT(S): 5000 INJECTION INTRAVENOUS; SUBCUTANEOUS at 11:12

## 2018-12-17 RX ADMIN — LOSARTAN POTASSIUM 50 MILLIGRAM(S): 100 TABLET, FILM COATED ORAL at 11:12

## 2018-12-17 RX ADMIN — PANTOPRAZOLE SODIUM 40 MILLIGRAM(S): 20 TABLET, DELAYED RELEASE ORAL at 05:32

## 2018-12-17 RX ADMIN — AMLODIPINE BESYLATE 5 MILLIGRAM(S): 2.5 TABLET ORAL at 05:32

## 2018-12-17 RX ADMIN — ONDANSETRON 4 MILLIGRAM(S): 8 TABLET, FILM COATED ORAL at 09:55

## 2018-12-17 RX ADMIN — Medication 10 MILLIGRAM(S): at 02:05

## 2018-12-17 RX ADMIN — Medication 10 MILLIGRAM(S): at 08:24

## 2018-12-17 RX ADMIN — Medication 650 MILLIGRAM(S): at 11:46

## 2018-12-17 RX ADMIN — Medication 15 MILLILITER(S): at 10:14

## 2018-12-17 RX ADMIN — HEPARIN SODIUM 5000 UNIT(S): 5000 INJECTION INTRAVENOUS; SUBCUTANEOUS at 21:54

## 2018-12-17 RX ADMIN — Medication 50 MILLIGRAM(S): at 05:32

## 2018-12-17 NOTE — PHARMACOTHERAPY INTERVENTION NOTE - COMMENTS
Admission medication reconciliation confirmed with patient, daughter, and pharmacy, updated in outpatient medication review.

## 2018-12-17 NOTE — CONSULT NOTE ADULT - SUBJECTIVE AND OBJECTIVE BOX
CHIEF COMPLAINT:     HPI:  89 f [place], gastric ulcer, colon ca s/p resection 2 yrs ago, htn, macular degeneration, h/o bezoar + daily dromperidone a/w worsening confusion.  Pt noted to be more confused, sleepy and slurring words.  Pt herself is able to say that she couldn't control what she wanted to say.  Pt had been sleeping most of the day, poor PO intake, was up late this week for a concert with altered sleeping pattern.  Pt admits to mild cough and mild nausea (baseline pt has frequent nausea and heart burn).  No f/c at home.  No sob/cp.  Pt urinates frequently at baseline with no change.  no diarrhea.    Daughter states she has had these periods of brief confusion in past, once with uti, ? others with mild viral illness.    Pt very functional, lives along.  Poor gait, has fallen once in the last year.      In ED initial bp 193/83 (repeat 151/87), hr 70, rr 16, sat 96, t 98.4 max 101  Given NS 1850cc, tylenol 1 g and ctx 1 g  Pt mental status improves significantly since arriving in ED  RVP + coronavirus, cxr no infiltrate, CTH no acute pathology    Med rec done with daughter:  had 1 bottle of metoprolol 50/hctz 25 daily and multiple individual pills:  dromperidone 10mg, ocuvite, zantac 150, omeprazole ? dose, zoloft ? dose (14 Dec 2018 21:32)      PAST MEDICAL & SURGICAL HISTORY:  Bezoar, sequela  Macular degeneration, unspecified laterality, unspecified type  HTN (hypertension)  Colon cancer  Gastric ulcer  H/O abdominal hysterectomy  History of colon resection      Allergies    No Known Allergies    Intolerances        SOCIAL HISTORY    Smoking Hx: None  ETOH Hx: None      FAMILY HISTORY:  No pertinent family history in first degree relatives      MEDICATIONS:  acetaminophen   Tablet .. 650 milliGRAM(s) Oral every 6 hours PRN  amLODIPine   Tablet 5 milliGRAM(s) Oral daily  azithromycin   Tablet 250 milliGRAM(s) Oral daily  heparin  Injectable 5000 Unit(s) SubCutaneous every 12 hours  metoprolol succinate ER 50 milliGRAM(s) Oral daily  pantoprazole    Tablet 40 milliGRAM(s) Oral before breakfast      REVIEW OF SYSTEMS:    CONSTITUTIONAL: No weakness, fevers or chills  EYES/ENT: No visual changes;  No vertigo or throat pain   NECK: No pain or stiffness  RESPIRATORY: No cough, wheezing, hemoptysis; No shortness of breath  CARDIOVASCULAR: No chest pain or palpitations  GASTROINTESTINAL: No abdominal or epigastric pain. No nausea, vomiting, or hematemesis; No diarrhea or constipation. No melena or hematochezia.  GENITOURINARY: No dysuria, frequency or hematuria  NEUROLOGICAL: No numbness or weakness  SKIN: No itching, burning, rashes, or lesions   All other review of systems is negative unless indicated above    Vital Signs Last 24 Hrs  T(C): 36.4 (17 Dec 2018 09:38), Max: 36.7 (16 Dec 2018 18:42)  T(F): 97.6 (17 Dec 2018 09:38), Max: 98 (16 Dec 2018 18:42)  HR: 115 (17 Dec 2018 09:38) (56 - 115)  BP: 172/87 (17 Dec 2018 09:38) (158/88 - 202/90)  BP(mean): --  RR: 20 (17 Dec 2018 09:38) (16 - 20)  SpO2: 97% (17 Dec 2018 09:38) (96% - 100%)    I&O's Summary    16 Dec 2018 07:01  -  17 Dec 2018 07:00  --------------------------------------------------------  IN: 600 mL / OUT: 0 mL / NET: 600 mL    17 Dec 2018 07:01  -  17 Dec 2018 10:39  --------------------------------------------------------  IN: 420 mL / OUT: 0 mL / NET: 420 mL        PHYSICAL EXAM:    Constitutional: NAD, awake and alert, well-developed  HEENT: PERR, EOMI  Neck: soft and supple, No LAD, No JVD  Respiratory: Breath sounds are clear bilaterally, No wheezing, rales or rhonchi  Cardiovascular: Regular rate and rhythm, normal S1 and S2,  no murmurs, gallops or rubs  Gastrointestinal: Bowel Sounds present, soft, nontender.   Extremities: No peripheral edema. No clubbing or cyanosis.  Vascular: 2+ peripheral pulses  Neurological: A/O x 3, no focal deficits  Musculoskeletal: no calf tenderness.  Skin: No rashes.      LABS: All Labs Reviewed:                        12.0   7.06  )-----------( 299      ( 17 Dec 2018 09:18 )             37.7                         11.3   5.99  )-----------( 276      ( 16 Dec 2018 10:58 )             35.9                         11.5   6.09  )-----------( 285      ( 15 Dec 2018 09:18 )             35.7     17 Dec 2018 06:39    138    |  101    |  10     ----------------------------<  94     4.2     |  25     |  0.51   16 Dec 2018 07:27    138    |  103    |  10     ----------------------------<  97     4.4     |  25     |  0.51   15 Dec 2018 17:46    137    |  103    |  11     ----------------------------<  89     4.9     |  24     |  0.43     Ca    9.4        17 Dec 2018 06:39  Ca    9.0        16 Dec 2018 07:27  Ca    8.8        15 Dec 2018 17:46  Phos  2.9       15 Dec 2018 06:53  Mg     2.0       17 Dec 2018 06:39  Mg     2.1       15 Dec 2018 06:53    TPro  6.1    /  Alb  3.5    /  TBili  0.3    /  DBili  x      /  AST  13     /  ALT  12     /  AlkPhos  129    15 Dec 2018 06:53  TPro  7.3    /  Alb  4.1    /  TBili  0.4    /  DBili  x      /  AST  18     /  ALT  16     /  AlkPhos  158    14 Dec 2018 15:29      Blood Culture: Organism --  Gram Stain Blood -- Gram Stain --  Specimen Source .Blood Blood-Peripheral  Culture-Blood --    Organism --  Gram Stain Blood -- Gram Stain --  Specimen Source .Urine Clean Catch (Midstream)  Culture-Blood --        CARDIAC MARKERS:        TSH: Thyroid Stimulating Hormone, Serum: 3.82 uIU/mL (12-17 @ 07:55)            RADIOLOGY/EKG: NSR, no ischemic changes

## 2018-12-17 NOTE — CONSULT NOTE ADULT - ASSESSMENT
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

## 2018-12-17 NOTE — PROVIDER CONTACT NOTE (OTHER) - ASSESSMENT
4/10 CP, unable to describe, reports weakness in arms & c/o nausea. Denies pain radiating elsewhere. /87  & sustaining
/98 HR 68 manually
Pt alert and oriented x 2-3 upon arrival from ER, as per daughter pt with confusions
pt a&o x3 forgetful. /90 manually HR 75 pt is asymptomatic. No complaints of chest pain, SOB, lightheadedness, dizziness.
pt alert and oriented x3 forgetful

## 2018-12-17 NOTE — PROGRESS NOTE ADULT - ASSESSMENT
89 f [place], gastric ulcer, colon ca s/p resection 2 yrs ago, htn, macular degeneration, h/o bezoar admitted with one episode of "slurred speech " , difficulty with word findings and  confusion found to have respiratroy infection with possible pna on CT    1- Slurred speech / confusion : now at her baseline   cont tele   f/u  MRI ,   check echo   B12: low nl  :  check mma    foate and tsh :NL   neuro and cardio input apprecaited   will start asa   lipiitor     2- presumed sepsis : cont abx   will consider limiting      3- HTN urgency :  HA earlier when BP elevated to 190s now improved  discussed with  will start ARB 89 f [place], gastric ulcer, colon ca s/p resection 2 yrs ago, htn, macular degeneration, h/o bezoar admitted with one episode of "slurred speech " , difficulty with word findings and  confusion found to have respiratroy infection with possible pna on CT    1- Slurred speech / confusion : now at her baseline   cont tele   f/u  MRI ,   check echo   B12: low nl  :  check mma    foate and tsh :NL   neuro and cardio input apprecaited   will start asa   lipiitor     2- presumed sepsis : cont abx   will consider limiting      3- HTN urgency :  HA earlier when BP elevated to 190s now improved  discussed with  will start ARB    4- chest pain r/o acs   ekg nl   trop neg   cont tele   f/u echo

## 2018-12-17 NOTE — CONSULT NOTE ADULT - ASSESSMENT
89 f [place], gastric ulcer, colon ca s/p resection 2 yrs ago, htn, macular degeneration, h/o bezoar + daily dromperidone a/w worsening confusion.  Pt noted to be more confused, sleepy and slurring words.   Now she is feeling better.  No more confusion.    Appreciate neuro input.  Neuro symptoms related to medical issues in past  Obtain echo to r/o small possibility of embolic event though doubt highly  Add 50 mg of losartan qd to current medical regiman  Discussed with Dr. Jarvis.

## 2018-12-17 NOTE — PROGRESS NOTE ADULT - SUBJECTIVE AND OBJECTIVE BOX
Patient is a 89y old  Female who presents with a chief complaint of confusion (17 Dec 2018 10:38)                                                               INTERVAL HPI/OVERNIGHT EVENTS:    REVIEW OF SYSTEMS:     CONSTITUTIONAL: generalized weakness     EYES/ENT: No visual changes , no ear ache   NECK: No pain or stiffness  RESPIRATORY: No cough, wheezing,  No shortness of breath  CARDIOVASCULAR: No chest pain or palpitations  GASTROINTESTINAL: No abdominal pain  . No nausea, vomiting, or hematemesis; No diarrhea or constipation. No melena or hematochezia.  GENITOURINARY: No dysuria, frequency or hematuria  NEUROLOGICAL: No numbness or weakness                                                                                                                                                                                                                                                                                  Medications:  MEDICATIONS  (STANDING):  amLODIPine   Tablet 5 milliGRAM(s) Oral daily  azithromycin   Tablet 250 milliGRAM(s) Oral daily  heparin  Injectable 5000 Unit(s) SubCutaneous every 12 hours  losartan 50 milliGRAM(s) Oral daily  metoprolol succinate ER 50 milliGRAM(s) Oral daily  pantoprazole    Tablet 40 milliGRAM(s) Oral before breakfast    MEDICATIONS  (PRN):  acetaminophen   Tablet .. 650 milliGRAM(s) Oral every 6 hours PRN Temp greater or equal to 38C (100.4F), Mild Pain (1 - 3), Moderate Pain (4 - 6)       Allergies    No Known Allergies    Intolerances      Vital Signs Last 24 Hrs  T(C): 36.6 (17 Dec 2018 18:00), Max: 36.6 (17 Dec 2018 18:00)  T(F): 97.9 (17 Dec 2018 18:00), Max: 97.9 (17 Dec 2018 18:00)  HR: 73 (17 Dec 2018 18:00) (61 - 115)  BP: 163/87 (17 Dec 2018 18:00) (158/88 - 202/90)  BP(mean): --  RR: 16 (17 Dec 2018 18:00) (16 - 20)  SpO2: 97% (17 Dec 2018 18:00) (96% - 98%)  CAPILLARY BLOOD GLUCOSE           @ 07:  -   @ 07:00  --------------------------------------------------------  IN: 600 mL / OUT: 0 mL / NET: 600 mL     @ 07: @ 19:16  --------------------------------------------------------  IN: 420 mL / OUT: 0 mL / NET: 420 mL      Physical Exam:    Daily Weight in k.1 (17 Dec 2018 11:09)  General:  NAD  HEENT:  Nonicteric, PERRLA  CV:  RRR, S1S2   Lungs:  CTA B  Abdomen:  Soft, non-tender, no distended, positive BS  Extremities:  no edema  Skin:  Warm and dry, no rashes  :  No mora  Neuro:  AAOx3, non-focal, grossly intact                                                                                                                                                                                                                                                                                                LABS:                               12.0   7.06  )-----------( 299      ( 17 Dec 2018 09:18 )             37.7                          138  |  101  |  10  ----------------------------<  94  4.2   |  25  |  0.51    Ca    9.4      17 Dec 2018 06:39  Mg     2.0      Patient is a 89y old  Female who presents with a chief complaint of confusion (17 Dec 2018 10:38)                                                               INTERVAL HPI/OVERNIGHT EVENTS:  HA earlier when bp elevatd   CP :  briefely   ekg no acute changes and negative trp  REVIEW OF SYSTEMS:     CONSTITUTIONAL: generalized weakness     EYES/ENT: No visual changes , no ear ache   NECK: No pain or stiffness  RESPIRATORY: No cough, wheezing,  No shortness of breath  CARDIOVASCULAR: No chest pain or palpitations  GASTROINTESTINAL: No abdominal pain  . No nausea, vomiting, or hematemesis; No diarrhea or constipation. No melena or hematochezia.  GENITOURINARY: No dysuria, frequency or hematuria  NEUROLOGICAL: No numbness or weakness                                                                                                                                                                                                                                                                                  Medications:  MEDICATIONS  (STANDING):  amLODIPine   Tablet 5 milliGRAM(s) Oral daily  azithromycin   Tablet 250 milliGRAM(s) Oral daily  heparin  Injectable 5000 Unit(s) SubCutaneous every 12 hours  losartan 50 milliGRAM(s) Oral daily  metoprolol succinate ER 50 milliGRAM(s) Oral daily  pantoprazole    Tablet 40 milliGRAM(s) Oral before breakfast    MEDICATIONS  (PRN):  acetaminophen   Tablet .. 650 milliGRAM(s) Oral every 6 hours PRN Temp greater or equal to 38C (100.4F), Mild Pain (1 - 3), Moderate Pain (4 - 6)       Allergies    No Known Allergies    Intolerances      Vital Signs Last 24 Hrs  T(C): 36.6 (17 Dec 2018 18:00), Max: 36.6 (17 Dec 2018 18:00)  T(F): 97.9 (17 Dec 2018 18:00), Max: 97.9 (17 Dec 2018 18:00)  HR: 73 (17 Dec 2018 18:00) (61 - 115)  BP: 163/87 (17 Dec 2018 18:00) (158/88 - 202/90)  BP(mean): --  RR: 16 (17 Dec 2018 18:00) (16 - 20)  SpO2: 97% (17 Dec 2018 18:00) (96% - 98%)  CAPILLARY BLOOD GLUCOSE          12-16 @ 07:01  -  12-17 @ 07:00  --------------------------------------------------------  IN: 600 mL / OUT: 0 mL / NET: 600 mL     @ 07:01  -   @ 19:16  --------------------------------------------------------  IN: 420 mL / OUT: 0 mL / NET: 420 mL      Physical Exam:    Daily Weight in k.1 (17 Dec 2018 11:09)  General:  NAD  HEENT:  Nonicteric, PERRLA  CV:  RRR, S1S2   Lungs:  CTA B  Abdomen:  Soft, non-tender, no distended, positive BS  Extremities:  no edema  Skin:  Warm and dry, no rashes  :  No mora  Neuro:  AAOx3, non-focal, grossly intact                                                                                                                                                                                                                                                                                                LABS:                               12.0   7.06  )-----------( 299      ( 17 Dec 2018 09:18 )             37.7                          138  |  101  |  10  ----------------------------<  94  4.2   |  25  |  0.51    Ca    9.4      17 Dec 2018 06:39  Mg     2.0

## 2018-12-17 NOTE — PROVIDER CONTACT NOTE (OTHER) - ACTION/TREATMENT ORDERED:
zofran & maalox given, EKG done & CE sent. No new orders at this time/
10mg IVP hydralazine ordered & given. BP reassessed at 158/88 manually. No new orders at this time
No new order given
No new order given at this time
Ordered Hydralazine 10 mg PO. Reassess BP. Continue to monitor

## 2018-12-17 NOTE — CONSULT NOTE ADULT - SUBJECTIVE AND OBJECTIVE BOX
Admitting Diagnosis:  Coronavirus infection (B34.2): CORONAVIRUS INFECTION, UNSPECIFIED      HPI:  This is a 89y year old Female with the below past medical history who presents with the chief complaint of slurred speech. PMHs: [place], gastric ulcer, colon ca s/p resection 2 yrs ago, htn, macular degeneration, h/o bezoar + daily dromperidone   Pt noted to be more confused, sleepy and slurring words.  Pt herself is able to say that she couldn't control what she wanted to say.  as per isier was mildly confused.   Daughter states she has had these periods of brief confusion in past, once with uti, ? others with mild viral illness. pt herself denied diplopia, focal weakness, numbness, changes in vision, bowel or bladder control.       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)  Macular degeneration, unspecified laterality, unspecified type (H35.30)  HTN (hypertension) (I10)  Colon cancer (C18.9)  Gastric ulcer (K25.9)      Past Surgical History:  H/O abdominal hysterectomy (Z90.710)  History of colon resection (Z90.49)      Social History:  No toxic habits    Family History:  FAMILY HISTORY:  No pertinent family history in first degree relatives      Allergies:  No Known Allergies      ROS:  Constitutional: Patient offers no complaints of fevers or significant weight loss  Ears, Nose, Mouth and Throat: The patient presents with no abnormalities of the head, ears, eyes, nose or throat  Skin: Patient offers no concerns of new rashes or lesions  Respiratory: The patient presents with no abnormalities of the respiratory tract  Cardiovascular: The patient presents with no cardiac abnormalities  Gastrointestinal: The patient presents with no abnormalities of the GI system  Genitourinary: The patient presents with no dysuria, hematuria or frequent urination  Neurological: See HPI  Endocrine: Patient offers no complaints of excessive thirst, urination, or heat/cold intolerance    Advanced care planning reviewed and noted in the chart.    Medications:  acetaminophen   Tablet .. 650 milliGRAM(s) Oral every 6 hours PRN  amLODIPine   Tablet 5 milliGRAM(s) Oral daily  azithromycin   Tablet 250 milliGRAM(s) Oral daily  heparin  Injectable 5000 Unit(s) SubCutaneous every 12 hours  metoprolol succinate ER 50 milliGRAM(s) Oral daily  pantoprazole    Tablet 40 milliGRAM(s) Oral before breakfast      Labs:  CBC Full  -  ( 16 Dec 2018 10:58 )  WBC Count : 5.99 K/uL  Hemoglobin : 11.3 g/dL  Hematocrit : 35.9 %  Platelet Count - Automated : 276 K/uL  Mean Cell Volume : 80.3 fl  Mean Cell Hemoglobin : 25.3 pg  Mean Cell Hemoglobin Concentration : 31.5 gm/dL  Auto Neutrophil # : 3.60 K/uL  Auto Lymphocyte # : 1.60 K/uL  Auto Monocyte # : 0.55 K/uL  Auto Eosinophil # : 0.19 K/uL  Auto Basophil # : 0.03 K/uL  Auto Neutrophil % : 60.1 %  Auto Lymphocyte % : 26.7 %  Auto Monocyte % : 9.2 %  Auto Eosinophil % : 3.2 %  Auto Basophil % : 0.5 %    12-17    138  |  101  |  10  ----------------------------<  94  4.2   |  25  |  0.51    Ca    9.4      17 Dec 2018 06:39  Mg     2.0     12-17      CAPILLARY BLOOD GLUCOSE              Female    Vitals:  Vital Signs Last 24 Hrs  T(C): 36.4 (17 Dec 2018 05:22), Max: 36.7 (16 Dec 2018 18:42)  T(F): 97.6 (17 Dec 2018 05:22), Max: 98 (16 Dec 2018 18:42)  HR: 68 (17 Dec 2018 07:54) (56 - 75)  BP: 198/98 (17 Dec 2018 07:54) (162/72 - 202/90)  BP(mean): --  RR: 18 (17 Dec 2018 07:00) (16 - 18)  SpO2: 97% (17 Dec 2018 07:00) (96% - 100%)    NEUROLOGICAL EXAM:    Mental status: Awake, alert, and in no apparent distress. Oriented to person, place and time. Language function is normal. Recent memory, digit span and concentration were normal.     Cranial Nerves: Pupils were equal, round, reactive to light. Extraocular movements were intact. Visual field were full. Fundoscopic exam was deferred. Facial sensation was intact to light touch. There was no facial asymmetry. The palate was upgoing symmetrically and tongue was midline. Hearing acuity was diminished to finger rub AU. Shoulder shrug was full bilaterally    Motor exam: Bulk and tone were normal. Strength was 5/5 in all four extremities. Fine finger movements were symmetric and normal. There was no pronator drift    Reflexes: 2+ in the bilateral upper extremities. 2+ in the bilateral lower extremities. Toes were downgoing bilaterally.     Sensation: Intact to light touch, temperature, vibration and proprioception.     Coordination: Finger-nose-finger and heel-to-shin was without dysmetria.     Gait: defer    NIHSS - 0    < from: CT Head No Cont (12.14.18 @ 16:44) >    EXAM:  CT BRAIN                            PROCEDURE DATE:  12/14/2018            INTERPRETATION:  Noncontrast CT of the brain.    CLINICAL INDICATION:  Headache, nausea    TECHNIQUE : Axial CT scanning of the brain was obtained from the skull   base to the vertex without the administration of intravenous contrast.      COMPARISON: None available    FINDINGS:      There is no hydrocephalus, mass effect, midline shift, vasogenic edema,   or acute intracranial hemorrhage.  There is no CT evidenceof acute   territorial infarct.  There is moderate white matter microvascular   ischemic disease.    The visualized mastoid air cells are clear. Left maxillary sinus mucosal   thickening.    IMPRESSION:    No acute intracranial hemorrhage, mass effect, vasogenic edema, or   evidence of acute territorial infarct.                    ZAINA ENGLISH M.D., ATTENDING RADIOLOGIST  This document has been electronically signed. Dec 14 2018  5:07PM                < end of copied text >

## 2018-12-18 ENCOUNTER — TRANSCRIPTION ENCOUNTER (OUTPATIENT)
Age: 83
End: 2018-12-18

## 2018-12-18 VITALS
HEART RATE: 67 BPM | DIASTOLIC BLOOD PRESSURE: 70 MMHG | RESPIRATION RATE: 17 BRPM | OXYGEN SATURATION: 97 % | TEMPERATURE: 98 F | SYSTOLIC BLOOD PRESSURE: 128 MMHG

## 2018-12-18 LAB
ANION GAP SERPL CALC-SCNC: 13 MMOL/L — SIGNIFICANT CHANGE UP (ref 5–17)
BUN SERPL-MCNC: 9 MG/DL — SIGNIFICANT CHANGE UP (ref 7–23)
CALCIUM SERPL-MCNC: 8.9 MG/DL — SIGNIFICANT CHANGE UP (ref 8.4–10.5)
CHLORIDE SERPL-SCNC: 101 MMOL/L — SIGNIFICANT CHANGE UP (ref 96–108)
CO2 SERPL-SCNC: 23 MMOL/L — SIGNIFICANT CHANGE UP (ref 22–31)
CREAT SERPL-MCNC: 0.47 MG/DL — LOW (ref 0.5–1.3)
GLUCOSE SERPL-MCNC: 100 MG/DL — HIGH (ref 70–99)
HCT VFR BLD CALC: 36.2 % — SIGNIFICANT CHANGE UP (ref 34.5–45)
HGB BLD-MCNC: 11.5 G/DL — SIGNIFICANT CHANGE UP (ref 11.5–15.5)
MCHC RBC-ENTMCNC: 24.8 PG — LOW (ref 27–34)
MCHC RBC-ENTMCNC: 31.8 GM/DL — LOW (ref 32–36)
MCV RBC AUTO: 78.2 FL — LOW (ref 80–100)
PLATELET # BLD AUTO: 281 K/UL — SIGNIFICANT CHANGE UP (ref 150–400)
POTASSIUM SERPL-MCNC: 3.9 MMOL/L — SIGNIFICANT CHANGE UP (ref 3.5–5.3)
POTASSIUM SERPL-SCNC: 3.9 MMOL/L — SIGNIFICANT CHANGE UP (ref 3.5–5.3)
RBC # BLD: 4.63 M/UL — SIGNIFICANT CHANGE UP (ref 3.8–5.2)
RBC # FLD: 15.4 % — HIGH (ref 10.3–14.5)
SODIUM SERPL-SCNC: 137 MMOL/L — SIGNIFICANT CHANGE UP (ref 135–145)
WBC # BLD: 5.85 K/UL — SIGNIFICANT CHANGE UP (ref 3.8–10.5)
WBC # FLD AUTO: 5.85 K/UL — SIGNIFICANT CHANGE UP (ref 3.8–10.5)

## 2018-12-18 PROCEDURE — 97161 PT EVAL LOW COMPLEX 20 MIN: CPT

## 2018-12-18 PROCEDURE — 83735 ASSAY OF MAGNESIUM: CPT

## 2018-12-18 PROCEDURE — 84100 ASSAY OF PHOSPHORUS: CPT

## 2018-12-18 PROCEDURE — 99285 EMERGENCY DEPT VISIT HI MDM: CPT | Mod: 25

## 2018-12-18 PROCEDURE — 70553 MRI BRAIN STEM W/O & W/DYE: CPT

## 2018-12-18 PROCEDURE — 85610 PROTHROMBIN TIME: CPT

## 2018-12-18 PROCEDURE — 85027 COMPLETE CBC AUTOMATED: CPT

## 2018-12-18 PROCEDURE — 96366 THER/PROPH/DIAG IV INF ADDON: CPT

## 2018-12-18 PROCEDURE — 82550 ASSAY OF CK (CPK): CPT

## 2018-12-18 PROCEDURE — 85730 THROMBOPLASTIN TIME PARTIAL: CPT

## 2018-12-18 PROCEDURE — 87798 DETECT AGENT NOS DNA AMP: CPT

## 2018-12-18 PROCEDURE — 84443 ASSAY THYROID STIM HORMONE: CPT

## 2018-12-18 PROCEDURE — 96365 THER/PROPH/DIAG IV INF INIT: CPT

## 2018-12-18 PROCEDURE — 93005 ELECTROCARDIOGRAM TRACING: CPT

## 2018-12-18 PROCEDURE — 84145 PROCALCITONIN (PCT): CPT

## 2018-12-18 PROCEDURE — 81001 URINALYSIS AUTO W/SCOPE: CPT

## 2018-12-18 PROCEDURE — 82553 CREATINE MB FRACTION: CPT

## 2018-12-18 PROCEDURE — 71045 X-RAY EXAM CHEST 1 VIEW: CPT

## 2018-12-18 PROCEDURE — 71250 CT THORAX DX C-: CPT

## 2018-12-18 PROCEDURE — 83605 ASSAY OF LACTIC ACID: CPT

## 2018-12-18 PROCEDURE — A9585: CPT

## 2018-12-18 PROCEDURE — 80061 LIPID PANEL: CPT

## 2018-12-18 PROCEDURE — 93306 TTE W/DOPPLER COMPLETE: CPT

## 2018-12-18 PROCEDURE — 87581 M.PNEUMON DNA AMP PROBE: CPT

## 2018-12-18 PROCEDURE — 87086 URINE CULTURE/COLONY COUNT: CPT

## 2018-12-18 PROCEDURE — 82746 ASSAY OF FOLIC ACID SERUM: CPT

## 2018-12-18 PROCEDURE — 70450 CT HEAD/BRAIN W/O DYE: CPT

## 2018-12-18 PROCEDURE — 82607 VITAMIN B-12: CPT

## 2018-12-18 PROCEDURE — 87633 RESP VIRUS 12-25 TARGETS: CPT

## 2018-12-18 PROCEDURE — 87040 BLOOD CULTURE FOR BACTERIA: CPT

## 2018-12-18 PROCEDURE — 80053 COMPREHEN METABOLIC PANEL: CPT

## 2018-12-18 PROCEDURE — 93306 TTE W/DOPPLER COMPLETE: CPT | Mod: 26

## 2018-12-18 PROCEDURE — 87486 CHLMYD PNEUM DNA AMP PROBE: CPT

## 2018-12-18 PROCEDURE — 80048 BASIC METABOLIC PNL TOTAL CA: CPT

## 2018-12-18 PROCEDURE — 84484 ASSAY OF TROPONIN QUANT: CPT

## 2018-12-18 RX ORDER — AMLODIPINE BESYLATE 2.5 MG/1
1 TABLET ORAL
Qty: 30 | Refills: 0
Start: 2018-12-18 | End: 2019-01-16

## 2018-12-18 RX ORDER — LOSARTAN POTASSIUM 100 MG/1
1 TABLET, FILM COATED ORAL
Qty: 30 | Refills: 0
Start: 2018-12-18 | End: 2019-01-16

## 2018-12-18 RX ORDER — PREGABALIN 225 MG/1
1 CAPSULE ORAL
Qty: 30 | Refills: 0
Start: 2018-12-18 | End: 2019-01-16

## 2018-12-18 RX ORDER — ASPIRIN/CALCIUM CARB/MAGNESIUM 324 MG
1 TABLET ORAL
Qty: 30 | Refills: 0
Start: 2018-12-18 | End: 2019-01-16

## 2018-12-18 RX ORDER — ATORVASTATIN CALCIUM 80 MG/1
10 TABLET, FILM COATED ORAL AT BEDTIME
Qty: 0 | Refills: 0 | Status: DISCONTINUED | OUTPATIENT
Start: 2018-12-18 | End: 2018-12-18

## 2018-12-18 RX ORDER — PREGABALIN 225 MG/1
1000 CAPSULE ORAL DAILY
Qty: 0 | Refills: 0 | Status: DISCONTINUED | OUTPATIENT
Start: 2018-12-18 | End: 2018-12-18

## 2018-12-18 RX ORDER — METOPROLOL SUCCINATE AND HYDROCHLOROTHIAZIDE 100; 12.5 MG/1; MG/1
1 TABLET, FILM COATED ORAL
Qty: 0 | Refills: 0 | COMMUNITY

## 2018-12-18 RX ORDER — METOPROLOL TARTRATE 50 MG
1 TABLET ORAL
Qty: 30 | Refills: 0
Start: 2018-12-18 | End: 2019-01-16

## 2018-12-18 RX ORDER — METOPROLOL TARTRATE 50 MG
1 TABLET ORAL
Qty: 0 | Refills: 0 | COMMUNITY
Start: 2018-12-18

## 2018-12-18 RX ORDER — ASPIRIN/CALCIUM CARB/MAGNESIUM 324 MG
81 TABLET ORAL DAILY
Qty: 0 | Refills: 0 | Status: DISCONTINUED | OUTPATIENT
Start: 2018-12-18 | End: 2018-12-18

## 2018-12-18 RX ORDER — PREGABALIN 225 MG/1
1000 CAPSULE ORAL ONCE
Qty: 0 | Refills: 0 | Status: COMPLETED | OUTPATIENT
Start: 2018-12-18 | End: 2018-12-18

## 2018-12-18 RX ORDER — ATORVASTATIN CALCIUM 80 MG/1
1 TABLET, FILM COATED ORAL
Qty: 30 | Refills: 0
Start: 2018-12-18 | End: 2019-01-16

## 2018-12-18 RX ADMIN — AMLODIPINE BESYLATE 5 MILLIGRAM(S): 2.5 TABLET ORAL at 05:01

## 2018-12-18 RX ADMIN — LOSARTAN POTASSIUM 50 MILLIGRAM(S): 100 TABLET, FILM COATED ORAL at 05:01

## 2018-12-18 RX ADMIN — AZITHROMYCIN 250 MILLIGRAM(S): 500 TABLET, FILM COATED ORAL at 11:47

## 2018-12-18 RX ADMIN — PREGABALIN 1000 MICROGRAM(S): 225 CAPSULE ORAL at 15:01

## 2018-12-18 RX ADMIN — HEPARIN SODIUM 5000 UNIT(S): 5000 INJECTION INTRAVENOUS; SUBCUTANEOUS at 11:47

## 2018-12-18 RX ADMIN — Medication 81 MILLIGRAM(S): at 11:52

## 2018-12-18 RX ADMIN — Medication 50 MILLIGRAM(S): at 05:01

## 2018-12-18 RX ADMIN — PANTOPRAZOLE SODIUM 40 MILLIGRAM(S): 20 TABLET, DELAYED RELEASE ORAL at 05:01

## 2018-12-18 NOTE — CONSULT NOTE ADULT - SUBJECTIVE AND OBJECTIVE BOX
HPI:   Patient is a 89y female with a past history of HTN, colectomy for colon cancer, hysterectomy, and remote gastric ulcer surgery who was admitted 12/14 after being found slurring words with word finding difficulty.She has had a fairly rapid reversal of this and is back to normal speech.no CVA was found on imaging studies.She tested positive for a coronavirus on her admission RVP, her CT scan showed areas of mucoid impaction.She is afebrile, does not c/o any significant cough, and is generally feeling well.She has received a dose of CTX and a few doses of azithromycin.Her PC levels were .02 and .03.  ,  REVIEW OF SYSTEMS:  All other review of systems negative (Comprehensive ROS)    PAST MEDICAL & SURGICAL HISTORY:  Bezoar, sequela  Macular degeneration, unspecified laterality, unspecified type  HTN (hypertension)  Colon cancer  Gastric ulcer  H/O abdominal hysterectomy  History of colon resection      Allergies    No Known Allergies    Intolerances        Antimicrobials Day #  :day 4  azithromycin   Tablet 250 milliGRAM(s) Oral daily    Other Medications:  acetaminophen   Tablet .. 650 milliGRAM(s) Oral every 6 hours PRN  amLODIPine   Tablet 5 milliGRAM(s) Oral daily  aspirin enteric coated 81 milliGRAM(s) Oral daily  atorvastatin 10 milliGRAM(s) Oral at bedtime  cyanocobalamin 1000 MICROGram(s) Oral daily  heparin  Injectable 5000 Unit(s) SubCutaneous every 12 hours  losartan 50 milliGRAM(s) Oral daily  metoprolol succinate ER 50 milliGRAM(s) Oral daily  pantoprazole    Tablet 40 milliGRAM(s) Oral before breakfast      FAMILY HISTORY:  No pertinent family history in first degree relatives      SOCIAL HISTORY:  Smoking: stopped 20 years ago    ETOH:no     Drug Use:no        T(F): 97.9 (12-18-18 @ 13:16), Max: 98.3 (12-18-18 @ 00:23)  HR: 67 (12-18-18 @ 13:16)  BP: 128/70 (12-18-18 @ 13:16)  RR: 17 (12-18-18 @ 13:16)  SpO2: 97% (12-18-18 @ 13:16)  Wt(kg): --    PHYSICAL EXAM:  General: alert, no acute distress  Eyes:  anicteric, no conjunctival injection, no discharge  Oropharynx: no lesions or injection 	  Neck: supple, without adenopathy  Lungs: clear to auscultation  Heart: regular rate and rhythm; no murmur, rubs or gallops  Abdomen: soft, nondistended, nontender, without mass or organomegaly  Skin: no lesions  Extremities: no clubbing, cyanosis, or edema  Neurologic: alert, oriented, moves all extremities    LAB RESULTS:                        11.5   5.85  )-----------( 281      ( 18 Dec 2018 08:12 )             36.2     12-18    137  |  101  |  9   ----------------------------<  100<H>  3.9   |  23  |  0.47<L>    Ca    8.9      18 Dec 2018 05:10  Mg     2.0     12-17            MICROBIOLOGY:  RECENT CULTURES:  12-14 @ 20:49 .Blood Blood-Peripheral     No growth to date.      12-14 @ 19:12 .Urine Clean Catch (Midstream)     No growth            RADIOLOGY REVIEWED:  < from: MR Head w/wo IV Cont (12.17.18 @ 19:00) >  IMPRESSION:      No abnormal parenchymal or leptomeningeal enhancement. No vasogenic edema.    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    Punctate acute infarct in the left parietotemporal region.    Multiple small meningiomas as detailed in the body the report.    Dr. Amador discussed these findings with Dr. Jarvis  on 12/18/2018 11:08 AM   with read back.    < end of copied text >  < from: CT Chest No Cont (12.14.18 @ 21:50) >  IMPRESSION:     A few clustered nodules in the superior right lower lobe and posterior   right upper lobe may be mucoid impaction, infectious, or inflammatory. No   lung consolidation.     Left upper lobe 7 mm nodule. Right upper lobe 4 mm groundglass nodule.    3 month follow-up CT is recommended for complete evaluation.    < from: Xray Chest 1 View AP/PA (12.14.18 @ 17:09) >    IMPRESSION: No consolidation    < end of copied text > HPI:   Patient is a 89y female with a past history of HTN, colectomy for colon cancer, hysterectomy, and remote gastric ulcer surgery who was admitted 12/14 after being found slurring words with word finding difficulty.She has had a fairly rapid reversal of this and is back to normal speech.no CVA was found on imaging studies.She tested positive for a coronavirus on her admission RVP, her CT scan showed areas of mucoid impaction.She is afebrile, does not c/o any significant cough, and is generally feeling well.She has received a dose of CTX and a few doses of azithromycin.Her PC levels were .02 and .03.  ,  REVIEW OF SYSTEMS:  All other review of systems negative (Comprehensive ROS)    PAST MEDICAL & SURGICAL HISTORY:  Bezoar, sequela  Macular degeneration, unspecified laterality, unspecified type  HTN (hypertension)  Colon cancer  Gastric ulcer  H/O abdominal hysterectomy  History of colon resection      Allergies    No Known Allergies    Intolerances        Antimicrobials Day #  :day 4  azithromycin   Tablet 250 milliGRAM(s) Oral daily    Other Medications:  acetaminophen   Tablet .. 650 milliGRAM(s) Oral every 6 hours PRN  amLODIPine   Tablet 5 milliGRAM(s) Oral daily  aspirin enteric coated 81 milliGRAM(s) Oral daily  atorvastatin 10 milliGRAM(s) Oral at bedtime  cyanocobalamin 1000 MICROGram(s) Oral daily  heparin  Injectable 5000 Unit(s) SubCutaneous every 12 hours  losartan 50 milliGRAM(s) Oral daily  metoprolol succinate ER 50 milliGRAM(s) Oral daily  pantoprazole    Tablet 40 milliGRAM(s) Oral before breakfast      FAMILY HISTORY:  No pertinent family history in first degree relatives      SOCIAL HISTORY:  Smoking: stopped 20 years ago    ETOH:no     Drug Use:no    [place] survivor    T(F): 97.9 (12-18-18 @ 13:16), Max: 98.3 (12-18-18 @ 00:23)  HR: 67 (12-18-18 @ 13:16)  BP: 128/70 (12-18-18 @ 13:16)  RR: 17 (12-18-18 @ 13:16)  SpO2: 97% (12-18-18 @ 13:16)  Wt(kg): --    PHYSICAL EXAM:  General: alert, no acute distress  Eyes:  anicteric, no conjunctival injection, no discharge  Oropharynx: no lesions or injection 	  Neck: supple, without adenopathy  Lungs: clear to auscultation  Heart: regular rate and rhythm; no murmur, rubs or gallops  Abdomen: soft, nondistended, nontender, without mass or organomegaly  Skin: no lesions  Extremities: no clubbing, cyanosis, or edema  Neurologic: alert, oriented, moves all extremities    LAB RESULTS:                        11.5   5.85  )-----------( 281      ( 18 Dec 2018 08:12 )             36.2     12-18    137  |  101  |  9   ----------------------------<  100<H>  3.9   |  23  |  0.47<L>    Ca    8.9      18 Dec 2018 05:10  Mg     2.0     12-17            MICROBIOLOGY:  RECENT CULTURES:  12-14 @ 20:49 .Blood Blood-Peripheral     No growth to date.      12-14 @ 19:12 .Urine Clean Catch (Midstream)     No growth            RADIOLOGY REVIEWED:  < from: MR Head w/wo IV Cont (12.17.18 @ 19:00) >  IMPRESSION:      No abnormal parenchymal or leptomeningeal enhancement. No vasogenic edema.    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    Punctate acute infarct in the left parietotemporal region.    Multiple small meningiomas as detailed in the body the report.    Dr. Amador discussed these findings with Dr. Jarvis  on 12/18/2018 11:08 AM   with read back.    < end of copied text >  < from: CT Chest No Cont (12.14.18 @ 21:50) >  IMPRESSION:     A few clustered nodules in the superior right lower lobe and posterior   right upper lobe may be mucoid impaction, infectious, or inflammatory. No   lung consolidation.     Left upper lobe 7 mm nodule. Right upper lobe 4 mm groundglass nodule.    3 month follow-up CT is recommended for complete evaluation.    < from: Xray Chest 1 View AP/PA (12.14.18 @ 17:09) >    IMPRESSION: No consolidation    < end of copied text >

## 2018-12-18 NOTE — PROGRESS NOTE ADULT - ASSESSMENT
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    Punctate acute infarct in the left parietotemporal region.    Multiple small meningiomas as detailed in the body the report.    f/u  echo : called to expedite   B12: low nl  :  start replenishment    foate and tsh :NL   neuro and cardio input apprecaited   cont  asa   lipiitor for acute CVA     2- presumed sepsis : consider d/c ing abx   doubt bacterial infection   ID input     3- HTN urgency : still with mild HA though improved overall   cont current meds and monitor       4- chest pain r/o acs   ekg nl   trop neg   cont tele   f/u echo     5- pul nodules : pul input   will need CT chest in 8 weeks     6- ossesous mets on MRI : onc input 89 f [place], gastric ulcer, colon ca s/p resection 2 yrs ago, htn, macular degeneration, h/o bezoar admitted with one episode of "slurred speech " , difficulty with word findings and  confusion found to have respiratroy infection with possible pna on CT    1- Slurred speech / confusion : now at her baseline   cont tele    MRI < from: MR Head w/wo IV Cont (12.17.18 @ 19:00) >  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    Punctate acute infarct in the left parietotemporal region.    Multiple small meningiomas as detailed in the body the report.    f/u  echo : called to expedite   B12: low nl  :  start replenishment    foate and tsh :NL   neuro and cardio input apprecaited   cont  asa   lipiitor for acute CVA     2- presumed sepsis : consider d/c ing abx   doubt bacterial infection   ID input     3- HTN urgency : still with mild HA though improved overall   cont current meds and monitor       4- chest pain r/o acs   ekg nl   trop neg   cont tele   f/u echo     5- pul nodules : pul input   will need CT chest in 8 weeks     6- ossesous mets on MRI : onc input ..discussed at length w/ pmd

## 2018-12-18 NOTE — CONSULT NOTE ADULT - ASSESSMENT
89 yo female admitted with slurred speech and word finding difficulty, rapidly reversed.  No support for a stroke.No prodrome of an infection prior to admission.  Given normal wbc, lack of fever,paucity of respiratory symptoms, and normal PC level I do not think we can blame presentation on "sepsis".  She may well have a viral illness which played a role-coronavirus, although this is not typically associated with neurologic findings.  CT scan of chest findings are very nonspecific.  She is doing well clinically.  Suggest:  1.Okay to stop antibiotics  2.I think she has had a fairly complete ID w/u  [facility] asking about a need for a f/u chest CT in 3 months, will defer to her PCP, Dr Guy.

## 2018-12-18 NOTE — PROGRESS NOTE ADULT - SUBJECTIVE AND OBJECTIVE BOX
SUBJECTIVE: Asymptomatic.  	  MEDICATIONS:  amLODIPine   Tablet 5 milliGRAM(s) Oral daily  losartan 50 milliGRAM(s) Oral daily  metoprolol succinate ER 50 milliGRAM(s) Oral daily  azithromycin   Tablet 250 milliGRAM(s) Oral daily  acetaminophen   Tablet .. 650 milliGRAM(s) Oral every 6 hours PRN  pantoprazole    Tablet 40 milliGRAM(s) Oral before breakfast  heparin  Injectable 5000 Unit(s) SubCutaneous every 12 hours      REVIEW OF SYSTEMS:    CONSTITUTIONAL: No fever, weight loss, or fatigue  EYES: No eye pain, visual disturbances, or discharge  NECK: No pain or stiffness  RESPIRATORY: No cough, wheezing, chills or hemoptysis; No Shortness of Breath  CARDIOVASCULAR: No chest pain, palpitations, dizziness, or leg swelling  GASTROINTESTINAL: No abdominal or epigastric pain. No nausea, vomiting, or hematemesis; No diarrhea or constipation. No melena or hematochezia.  GENITOURINARY: No dysuria, frequency, hematuria, or incontinence  NEUROLOGICAL: No headaches, memory loss, loss of strength, numbness, or tremors  SKIN: No itching, burning, rashes, or lesions   LYMPH Nodes: No enlarged glands  MUSCULOSKELETAL: No joint pain or swelling; No muscle, back, or extremity pain  All other review of systems are negative.      PHYSICAL EXAM:  T(C): 36.7 (12-18-18 @ 04:11), Max: 36.8 (12-18-18 @ 00:23)  HR: 73 (12-18-18 @ 06:31) (70 - 75)  BP: 159/78 (12-18-18 @ 06:31) (159/78 - 199/96)  RR: 16 (12-18-18 @ 04:11) (16 - 19)  SpO2: 97% (12-18-18 @ 04:11) (97% - 99%)  Wt(kg): --  I&O's Summary    17 Dec 2018 07:01  -  18 Dec 2018 07:00  --------------------------------------------------------  IN: 780 mL / OUT: 0 mL / NET: 780 mL          PHYSICAL EXAM    Appearance: Normal	  HEENT:   Normal oral mucosa, PERRL, EOMI	  NECK: Soft and supple, No LAD, No JVD  Cardiovascular: Regular Rate and Rhythm, Normal S1 S2, No murmurs, No clicks, gallops or rubs  Respiratory: Lungs clear to auscultation	  Skin: No rashes, No ecchymoses, No cyanosis  Extremities: No clubbing, cyanosis or edema  Vascular: Peripheral pulses palpable 2+ bilaterally    TELEMETRY: 	 SR 60     LABS:	 	                      11.5   5.85  )-----------( 281      ( 18 Dec 2018 08:12 )             36.2     12-18    137  |  101  |  9   ----------------------------<  100<H>  3.9   |  23  |  0.47<L>    Ca    8.9      18 Dec 2018 05:10  Mg     2.0     12-17

## 2018-12-18 NOTE — PROGRESS NOTE ADULT - ASSESSMENT
#confusion  #htn: improved, still suboptimal, on amlodipine and just started losartan.  Close follow up bp. Awaits echo.

## 2018-12-18 NOTE — PROGRESS NOTE ADULT - SUBJECTIVE AND OBJECTIVE BOX
Patient is a 89y old  Female who presents with a chief complaint of confusion (18 Dec 2018 11:20)                                                               INTERVAL HPI/OVERNIGHT EVENTS:    REVIEW OF SYSTEMS:     CONSTITUTIONAL: No weakness, fevers or chills  RESPIRATORY: No cough, wheezing,  No shortness of breath  CARDIOVASCULAR: No chest pain or palpitations  GASTROINTESTINAL: No abdominal pain  . No nausea, vomiting, or hematemesis; No diarrhea or constipation. No melena or hematochezia.  GENITOURINARY: No dysuria, frequency or hematuria  NEUROLOGICAL: No numbness or weakness                                                                                                                                                                                                                                                                                  Medications:  MEDICATIONS  (STANDING):  amLODIPine   Tablet 5 milliGRAM(s) Oral daily  aspirin enteric coated 81 milliGRAM(s) Oral daily  atorvastatin 10 milliGRAM(s) Oral at bedtime  azithromycin   Tablet 250 milliGRAM(s) Oral daily  cyanocobalamin 1000 MICROGram(s) Oral daily  heparin  Injectable 5000 Unit(s) SubCutaneous every 12 hours  losartan 50 milliGRAM(s) Oral daily  metoprolol succinate ER 50 milliGRAM(s) Oral daily  pantoprazole    Tablet 40 milliGRAM(s) Oral before breakfast    MEDICATIONS  (PRN):  acetaminophen   Tablet .. 650 milliGRAM(s) Oral every 6 hours PRN Temp greater or equal to 38C (100.4F), Mild Pain (1 - 3), Moderate Pain (4 - 6)       Allergies    No Known Allergies    Intolerances      Vital Signs Last 24 Hrs  T(C): 36.6 (18 Dec 2018 13:16), Max: 36.8 (18 Dec 2018 00:23)  T(F): 97.9 (18 Dec 2018 13:16), Max: 98.3 (18 Dec 2018 00:23)  HR: 67 (18 Dec 2018 13:16) (61 - 75)  BP: 128/70 (18 Dec 2018 13:16) (128/70 - 199/96)  BP(mean): --  RR: 17 (18 Dec 2018 13:16) (16 - 19)  SpO2: 97% (18 Dec 2018 13:16) (96% - 99%)  CAPILLARY BLOOD GLUCOSE          -17 @ 07:01  -   @ 07:00  --------------------------------------------------------  IN: 780 mL / OUT: 0 mL / NET: 780 mL     @ 07:01  -   @ 15:59  --------------------------------------------------------  IN: 660 mL / OUT: 350 mL / NET: 310 mL      Physical Exam:    Daily Weight in k.6 (18 Dec 2018 10:25)  General:  NAD   HEENT:  Nonicteric, PERRLA  CV:  RRR, S1S2   Lungs:  CTA  Abdomen:  Soft, non-tender, no distended, positive BS  Extremities:  2+ pulses, no c/c, no edema  Skin:  Warm and dry, no rashes  :  No mora  Neuro:  AAOx3, non-focal, grossly intact                                                                                                                                                                                                                                                                                                LABS:                               11.5   5.85  )-----------( 281      ( 18 Dec 2018 08:12 )             36.2                          137  |  101  |  9   ----------------------------<  100<H>  3.9   |  23  |  0.47<L>    Ca    8.9      18 Dec 2018 05:10  Mg     2.0

## 2018-12-18 NOTE — PROGRESS NOTE ADULT - ASSESSMENT
a/p - 89 yo [employer] survivor who presents with slurred speech in setting of positive corona virus ?post viral PNA as well as htn urgency  as per bren, has had similiar sx in setting of medical issues in the past    1. MRI brain notes single punctate focus of CVA in left parietal lobe. Unclear if etiology of symptoms or reflects microvascular collapse in setting of acute inflammation from corona virus.     2. Non-focal neurologic examination at this time (NIHSS 0)     3. Continue ASA and atorvastatin for secondary stroke prophylaxis. Will avoid higher statin dosing due to advanced age and history of myalgias.     4. PT    5. Calvarial mets as per primary team    6. No intervention for incidental meningiomas    7. Neuro checks    8. Stroke education performed    9. Dopplers followed as outpatient    10. Otherwise no neurologic objection to discharge when medically cleared.

## 2018-12-18 NOTE — PROGRESS NOTE ADULT - SUBJECTIVE AND OBJECTIVE BOX
SUBJECTIVE:  In chair, no complaints. No dysarthria. Feels well. Asking about discharge    Medications:  acetaminophen   Tablet .. 650 milliGRAM(s) Oral every 6 hours PRN  amLODIPine   Tablet 5 milliGRAM(s) Oral daily  aspirin enteric coated 81 milliGRAM(s) Oral daily  atorvastatin 10 milliGRAM(s) Oral at bedtime  azithromycin   Tablet 250 milliGRAM(s) Oral daily  cyanocobalamin 1000 MICROGram(s) Oral daily  cyanocobalamin Injectable 1000 MICROGram(s) IntraMuscular once  heparin  Injectable 5000 Unit(s) SubCutaneous every 12 hours  losartan 50 milliGRAM(s) Oral daily  metoprolol succinate ER 50 milliGRAM(s) Oral daily  pantoprazole    Tablet 40 milliGRAM(s) Oral before breakfast      Labs:  CBC Full  -  ( 18 Dec 2018 08:12 )  WBC Count : 5.85 K/uL  Hemoglobin : 11.5 g/dL  Hematocrit : 36.2 %  Platelet Count - Automated : 281 K/uL  Mean Cell Volume : 78.2 fl  Mean Cell Hemoglobin : 24.8 pg  Mean Cell Hemoglobin Concentration : 31.8 gm/dL  Auto Neutrophil # : x  Auto Lymphocyte # : x  Auto Monocyte # : x  Auto Eosinophil # : x  Auto Basophil # : x  Auto Neutrophil % : x  Auto Lymphocyte % : x  Auto Monocyte % : x  Auto Eosinophil % : x  Auto Basophil % : x    12-18    137  |  101  |  9   ----------------------------<  100<H>  3.9   |  23  |  0.47<L>    Ca    8.9      18 Dec 2018 05:10  Mg     2.0     12-17      CAPILLARY BLOOD GLUCOSE              Vitamin B12: 232 pg/mL [232  1245] 12-17-18 @ 07:55  Folate: 12.4 ng/mL 12-17-18 @ 07:55  Thyroid Function: -- 12-17-18 @ 07:55  Ammonia: -- 12-17-18 @ 07:55  Dilantin: -- 12-17-18 @ 07:55      Vitals:  Vital Signs Last 24 Hrs  T(C): 36.3 (18 Dec 2018 10:38), Max: 36.8 (18 Dec 2018 00:23)  T(F): 97.3 (18 Dec 2018 10:38), Max: 98.3 (18 Dec 2018 00:23)  HR: 61 (18 Dec 2018 10:38) (61 - 75)  BP: 168/77 (18 Dec 2018 10:38) (159/78 - 199/96)  BP(mean): --  RR: 16 (18 Dec 2018 10:38) (16 - 19)  SpO2: 96% (18 Dec 2018 10:38) (96% - 99%)  NEUROLOGICAL EXAM:    Mental status: Awake, alert, and in no apparent distress. Oriented to person, place and time. Language function is normal. Recent memory, digit span and concentration were normal.     Cranial Nerves: Pupils were equal, round, reactive to light. Extraocular movements were intact. Visual field were full. Fundoscopic exam was deferred. Facial sensation was intact to light touch. There was no facial asymmetry. The palate was upgoing symmetrically and tongue was midline. Hearing acuity was diminished to finger rub AU. Shoulder shrug was full bilaterally    Motor exam: Bulk and tone were normal. Strength was 5/5 in all four extremities. Fine finger movements were symmetric and normal. There was no pronator drift    Reflexes: 2+ in the bilateral upper extremities. 2+ in the bilateral lower extremities. Toes were downgoing bilaterally.     Sensation: Intact to light touch, temperature, vibration and proprioception.     Coordination: Finger-nose-finger and heel-to-shin was without dysmetria.     Gait: defer    NIHSS - 0    < from: CT Head No Cont (12.14.18 @ 16:44) >    EXAM:  CT BRAIN                            PROCEDURE DATE:  12/14/2018            INTERPRETATION:  Noncontrast CT of the brain.    CLINICAL INDICATION:  Headache, nausea    TECHNIQUE : Axial CT scanning of the brain was obtained from the skull   base to the vertex without the administration of intravenous contrast.      COMPARISON: None available    FINDINGS:      There is no hydrocephalus, mass effect, midline shift, vasogenic edema,   or acute intracranial hemorrhage.  There is no CT evidenceof acute   territorial infarct.  There is moderate white matter microvascular   ischemic disease.    The visualized mastoid air cells are clear. Left maxillary sinus mucosal   thickening.    IMPRESSION:    No acute intracranial hemorrhage, mass effect, vasogenic edema, or   evidence of acute territorial infarct.    < from: MR Head w/wo IV Cont (12.17.18 @ 19:00) >    EXAM:  MR BRAIN WAW IC                            PROCEDURE DATE:  12/17/2018            INTERPRETATION:  INDICATIONS:  History of colon cancer, slurred speech,   confusion, positive corona virus    TECHNIQUE:  Multiplanar imaging was performed using T1 weighted, T2   weighted and FLAIR sequences.  Diffusion weighted and SWI images were   also obtained.  Following intravenous gadolinium, multiplanar T1 weighted   images were performed. 6 cc Gadavist were administered. 1.5 cc were   discarded.      COMPARISON EXAMINATION:  CT head 12/14/2018, no prior MR head available   for comparison      FINDINGS:      VENTRICLES AND SULCI:  Mild prominence of the ventricles and sulci   consistent with age-appropriate volume loss.    INTRA-AXIAL:  There are extensive periventricular white matter changes,   related to chronic microvascular disease.    There is a punctate area of restricted diffusion (3:18) in the left   parietotemporal region.     No vasogenic edema. No parenchymal hemorrhage. No abnormal parenchymal or   leptomeningeal enhancement.    EXTRA-AXIAL:  There is an avidly enhancing 9 x 3 mm extra-axial lesion in   the left aspect of the prepontine cistern. There is a 1.0 x 1.2 x 1.2 cm   (anterior-posterior by transverse by craniocaudad) avidly enhancing dural   based lesion in the right posterior parasagittal parietal lobe which   approximates the superior sagittal sinus. Partial invasion of the   superior sagittal sinus is suspected. There is a 0.8 x 0.4 cm enhancing   dural based lesion along the right tentorial leaf. Findings are   consistent with the presence of multiple meningiomas.    No extra-axial collection.    VISUALIZED SINUSES:  Sinus thickening of the left maxillary sinus.    VISUALIZED MASTOIDS:  Clear.    CALVARIUM: Low signal intensity within the high left parietal bone   demonstrates enhancement on postcontrast images and is consistent with   osseous metastases.    INTRACRANIAL FLOW VOIDS:  Present.    IMPRESSION:      No abnormal parenchymal or leptomeningeal enhancement. No vasogenic edema.    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    Punctate acute infarct in the left parietotemporal region.    Multiple small meningiomas as detailed in the body the report.    Dr. English discussed these findings with Dr. Jarvis  on 12/18/2018 11:08 AM   with read back.                GENIA AVILES M.D., RADIOLOGY RESIDENT  This document has been electronically signed.  ZAINA ENGLISH M.D., ATTENDING RADIOLOGIST  This document has been electronically signed. Dec 18 2018 11:08AM                < end of copied text >                  ZAINA ENGLISH M.D., ATTENDING RADIOLOGIST  This document has been electronically signed. Dec 14 2018  5:07PM                < end of copied text >

## 2018-12-19 LAB
CULTURE RESULTS: SIGNIFICANT CHANGE UP
CULTURE RESULTS: SIGNIFICANT CHANGE UP
SPECIMEN SOURCE: SIGNIFICANT CHANGE UP
SPECIMEN SOURCE: SIGNIFICANT CHANGE UP

## 2019-03-14 ENCOUNTER — TRANSCRIPTION ENCOUNTER (OUTPATIENT)
Age: 84
End: 2019-03-14

## 2020-01-01 NOTE — DISCHARGE NOTE ADULT - CARE PROVIDERS DIRECT ADDRESSES
,DirectAddress_Unknown 7 ,DirectAddress_Unknown,DirectAddress_Unknown,DirectAddress_Unknown,DirectAddress_Unknown

## 2020-06-22 NOTE — H&P ADULT - PROBLEM SELECTOR PROBLEM 1
Tumor markers: every 6 months  --- due November 2020 (orders mailed to patients home address)  Get lab work drawn at least 1 month prior to appointment.    Scans: CT Abd/Pelvis in 6 months  ---  due December 2020  Call Scheduling Department at [phone] to schedule scans prior to next appointment:      Echo: Continue to hold on echocardiograms for this indication    Medications: Continue cholestyramine and B12 as prescribed    Return Clinic Appointment: in 6 months with Dr. Brewer - patient will call to schedule appointment       Coronavirus infection

## 2020-06-27 ENCOUNTER — EMERGENCY (EMERGENCY)
Facility: HOSPITAL | Age: 85
LOS: 1 days | Discharge: HOME | End: 2020-06-27
Attending: EMERGENCY MEDICINE
Payer: MEDICARE

## 2020-06-27 VITALS
DIASTOLIC BLOOD PRESSURE: 67 MMHG | HEART RATE: 67 BPM | OXYGEN SATURATION: 100 % | SYSTOLIC BLOOD PRESSURE: 186 MMHG | RESPIRATION RATE: 17 BRPM

## 2020-06-27 VITALS
SYSTOLIC BLOOD PRESSURE: 164 MMHG | DIASTOLIC BLOOD PRESSURE: 72 MMHG | HEART RATE: 82 BPM | HEIGHT: 60 IN | RESPIRATION RATE: 18 BRPM | WEIGHT: 134.92 LBS | OXYGEN SATURATION: 99 %

## 2020-06-27 DIAGNOSIS — Z90.49 ACQUIRED ABSENCE OF OTHER SPECIFIED PARTS OF DIGESTIVE TRACT: Chronic | ICD-10-CM

## 2020-06-27 DIAGNOSIS — Z90.710 ACQUIRED ABSENCE OF BOTH CERVIX AND UTERUS: Chronic | ICD-10-CM

## 2020-06-27 PROBLEM — H35.30 UNSPECIFIED MACULAR DEGENERATION: Chronic | Status: ACTIVE | Noted: 2018-12-14

## 2020-06-27 PROBLEM — C18.9 MALIGNANT NEOPLASM OF COLON, UNSPECIFIED: Chronic | Status: ACTIVE | Noted: 2018-12-14

## 2020-06-27 PROBLEM — I10 ESSENTIAL (PRIMARY) HYPERTENSION: Chronic | Status: ACTIVE | Noted: 2018-12-14

## 2020-06-27 PROBLEM — K25.9 GASTRIC ULCER, UNSPECIFIED AS ACUTE OR CHRONIC, WITHOUT HEMORRHAGE OR PERFORATION: Chronic | Status: ACTIVE | Noted: 2018-12-14

## 2020-06-27 PROBLEM — T18.9XXS: Chronic | Status: ACTIVE | Noted: 2018-12-14

## 2020-06-27 LAB
ALBUMIN SERPL ELPH-MCNC: 4 G/DL — SIGNIFICANT CHANGE UP (ref 3.3–5)
ALP SERPL-CCNC: 199 U/L — HIGH (ref 40–120)
ALT FLD-CCNC: 17 U/L — SIGNIFICANT CHANGE UP (ref 10–45)
ANION GAP SERPL CALC-SCNC: 11 MMOL/L — SIGNIFICANT CHANGE UP (ref 5–17)
APPEARANCE UR: CLEAR — SIGNIFICANT CHANGE UP
APTT BLD: 23 SEC — LOW (ref 27.5–36.3)
AST SERPL-CCNC: 23 U/L — SIGNIFICANT CHANGE UP (ref 10–40)
BASOPHILS # BLD AUTO: 0.04 K/UL — SIGNIFICANT CHANGE UP (ref 0–0.2)
BASOPHILS NFR BLD AUTO: 0.6 % — SIGNIFICANT CHANGE UP (ref 0–2)
BILIRUB SERPL-MCNC: 0.4 MG/DL — SIGNIFICANT CHANGE UP (ref 0.2–1.2)
BILIRUB UR-MCNC: NEGATIVE — SIGNIFICANT CHANGE UP
BUN SERPL-MCNC: 10 MG/DL — SIGNIFICANT CHANGE UP (ref 7–23)
CALCIUM SERPL-MCNC: 9.5 MG/DL — SIGNIFICANT CHANGE UP (ref 8.4–10.5)
CHLORIDE SERPL-SCNC: 102 MMOL/L — SIGNIFICANT CHANGE UP (ref 96–108)
CO2 SERPL-SCNC: 24 MMOL/L — SIGNIFICANT CHANGE UP (ref 22–31)
COLOR SPEC: SIGNIFICANT CHANGE UP
CREAT SERPL-MCNC: 0.48 MG/DL — LOW (ref 0.5–1.3)
DIFF PNL FLD: NEGATIVE — SIGNIFICANT CHANGE UP
EOSINOPHIL # BLD AUTO: 0.17 K/UL — SIGNIFICANT CHANGE UP (ref 0–0.5)
EOSINOPHIL NFR BLD AUTO: 2.6 % — SIGNIFICANT CHANGE UP (ref 0–6)
GLUCOSE SERPL-MCNC: 111 MG/DL — HIGH (ref 70–99)
GLUCOSE UR QL: NEGATIVE — SIGNIFICANT CHANGE UP
HCT VFR BLD CALC: 42.2 % — SIGNIFICANT CHANGE UP (ref 34.5–45)
HGB BLD-MCNC: 13 G/DL — SIGNIFICANT CHANGE UP (ref 11.5–15.5)
IMM GRANULOCYTES NFR BLD AUTO: 0.3 % — SIGNIFICANT CHANGE UP (ref 0–1.5)
INR BLD: 1.09 RATIO — SIGNIFICANT CHANGE UP (ref 0.88–1.16)
KETONES UR-MCNC: NEGATIVE — SIGNIFICANT CHANGE UP
LEUKOCYTE ESTERASE UR-ACNC: NEGATIVE — SIGNIFICANT CHANGE UP
LYMPHOCYTES # BLD AUTO: 1.46 K/UL — SIGNIFICANT CHANGE UP (ref 1–3.3)
LYMPHOCYTES # BLD AUTO: 22 % — SIGNIFICANT CHANGE UP (ref 13–44)
MCHC RBC-ENTMCNC: 25 PG — LOW (ref 27–34)
MCHC RBC-ENTMCNC: 30.8 GM/DL — LOW (ref 32–36)
MCV RBC AUTO: 81.2 FL — SIGNIFICANT CHANGE UP (ref 80–100)
MONOCYTES # BLD AUTO: 0.51 K/UL — SIGNIFICANT CHANGE UP (ref 0–0.9)
MONOCYTES NFR BLD AUTO: 7.7 % — SIGNIFICANT CHANGE UP (ref 2–14)
NEUTROPHILS # BLD AUTO: 4.44 K/UL — SIGNIFICANT CHANGE UP (ref 1.8–7.4)
NEUTROPHILS NFR BLD AUTO: 66.8 % — SIGNIFICANT CHANGE UP (ref 43–77)
NITRITE UR-MCNC: NEGATIVE — SIGNIFICANT CHANGE UP
NRBC # BLD: 0 /100 WBCS — SIGNIFICANT CHANGE UP (ref 0–0)
PH UR: 7 — SIGNIFICANT CHANGE UP (ref 5–8)
PLATELET # BLD AUTO: 178 K/UL — SIGNIFICANT CHANGE UP (ref 150–400)
POTASSIUM SERPL-MCNC: 4.3 MMOL/L — SIGNIFICANT CHANGE UP (ref 3.5–5.3)
POTASSIUM SERPL-SCNC: 4.3 MMOL/L — SIGNIFICANT CHANGE UP (ref 3.5–5.3)
PROT SERPL-MCNC: 6.4 G/DL — SIGNIFICANT CHANGE UP (ref 6–8.3)
PROT UR-MCNC: NEGATIVE — SIGNIFICANT CHANGE UP
PROTHROM AB SERPL-ACNC: 12.5 SEC — SIGNIFICANT CHANGE UP (ref 10–12.9)
RBC # BLD: 5.2 M/UL — SIGNIFICANT CHANGE UP (ref 3.8–5.2)
RBC # FLD: 15.1 % — HIGH (ref 10.3–14.5)
SARS-COV-2 RNA SPEC QL NAA+PROBE: SIGNIFICANT CHANGE UP
SODIUM SERPL-SCNC: 137 MMOL/L — SIGNIFICANT CHANGE UP (ref 135–145)
SP GR SPEC: 1.01 — LOW (ref 1.01–1.02)
TROPONIN T, HIGH SENSITIVITY RESULT: 10 NG/L — SIGNIFICANT CHANGE UP (ref 0–51)
TROPONIN T, HIGH SENSITIVITY RESULT: 12 NG/L — SIGNIFICANT CHANGE UP (ref 0–51)
UROBILINOGEN FLD QL: NEGATIVE — SIGNIFICANT CHANGE UP
WBC # BLD: 6.64 K/UL — SIGNIFICANT CHANGE UP (ref 3.8–10.5)
WBC # FLD AUTO: 6.64 K/UL — SIGNIFICANT CHANGE UP (ref 3.8–10.5)

## 2020-06-27 PROCEDURE — 70498 CT ANGIOGRAPHY NECK: CPT

## 2020-06-27 PROCEDURE — 99291 CRITICAL CARE FIRST HOUR: CPT | Mod: CS

## 2020-06-27 PROCEDURE — 82962 GLUCOSE BLOOD TEST: CPT

## 2020-06-27 PROCEDURE — 70498 CT ANGIOGRAPHY NECK: CPT | Mod: 26

## 2020-06-27 PROCEDURE — 85730 THROMBOPLASTIN TIME PARTIAL: CPT

## 2020-06-27 PROCEDURE — 87086 URINE CULTURE/COLONY COUNT: CPT

## 2020-06-27 PROCEDURE — 70450 CT HEAD/BRAIN W/O DYE: CPT | Mod: 26,59

## 2020-06-27 PROCEDURE — 93010 ELECTROCARDIOGRAM REPORT: CPT

## 2020-06-27 PROCEDURE — 80053 COMPREHEN METABOLIC PANEL: CPT

## 2020-06-27 PROCEDURE — 93005 ELECTROCARDIOGRAM TRACING: CPT

## 2020-06-27 PROCEDURE — 99291 CRITICAL CARE FIRST HOUR: CPT | Mod: 25

## 2020-06-27 PROCEDURE — 81003 URINALYSIS AUTO W/O SCOPE: CPT

## 2020-06-27 PROCEDURE — 70450 CT HEAD/BRAIN W/O DYE: CPT

## 2020-06-27 PROCEDURE — 84484 ASSAY OF TROPONIN QUANT: CPT

## 2020-06-27 PROCEDURE — 70496 CT ANGIOGRAPHY HEAD: CPT | Mod: 26

## 2020-06-27 PROCEDURE — 85027 COMPLETE CBC AUTOMATED: CPT

## 2020-06-27 PROCEDURE — 70496 CT ANGIOGRAPHY HEAD: CPT

## 2020-06-27 PROCEDURE — 85610 PROTHROMBIN TIME: CPT

## 2020-06-27 RX ORDER — ASPIRIN/CALCIUM CARB/MAGNESIUM 324 MG
81 TABLET ORAL ONCE
Refills: 0 | Status: COMPLETED | OUTPATIENT
Start: 2020-06-27 | End: 2020-06-27

## 2020-06-27 RX ORDER — ASPIRIN/CALCIUM CARB/MAGNESIUM 324 MG
1 TABLET ORAL
Qty: 21 | Refills: 0
Start: 2020-06-27 | End: 2020-07-17

## 2020-06-27 RX ORDER — CLOPIDOGREL BISULFATE 75 MG/1
300 TABLET, FILM COATED ORAL ONCE
Refills: 0 | Status: COMPLETED | OUTPATIENT
Start: 2020-06-27 | End: 2020-06-27

## 2020-06-27 RX ORDER — ATORVASTATIN CALCIUM 80 MG/1
1 TABLET, FILM COATED ORAL
Qty: 21 | Refills: 0
Start: 2020-06-27 | End: 2020-07-17

## 2020-06-27 RX ORDER — CLOPIDOGREL BISULFATE 75 MG/1
1 TABLET, FILM COATED ORAL
Qty: 21 | Refills: 0
Start: 2020-06-27 | End: 2020-07-17

## 2020-06-27 RX ADMIN — Medication 81 MILLIGRAM(S): at 17:46

## 2020-06-27 RX ADMIN — CLOPIDOGREL BISULFATE 300 MILLIGRAM(S): 75 TABLET, FILM COATED ORAL at 17:43

## 2020-06-27 NOTE — ED PROVIDER NOTE - SHIFT CHANGE DETAILS
Attending note (Gabriele): I have endorsed this patient to the incoming physician, including clinical history, physical exam, current results and assessment/plan. Pending results of CTA-head/neck, and further neurology service recommendations, to disposition patient.  If no significant stenosis or vascular lesions on CTA, and if neurology service recommends, likely d/c with ASA/plavix regimen.

## 2020-06-27 NOTE — ED PROVIDER NOTE - ATTENDING CONTRIBUTION TO CARE
Attending Statement (HOANG Suazo MD):    HPI: 89y/o F with h/o HTN, prior colon Ca (s/p resection; no further disease per family), presents with episode of slurred speech, with last known normal between 1.5 - 2 hours prior to arrival; arrives by EMS; CODE STROKE activated upon patient arrival.  Symptoms improving since onset and is now "back to baseline," per daugther who arrives with EMS.  Per patient denies current complaints.  No recent fever/chills, no cough/congestion, no sob/cp, no abd pain, no n/v/d, no urinary symptoms. No reported anticoagulant usage, surgery within last 1 month, speech/visual changes, fall, or head trauma.      Review of Systems:  -General: no fever or chills  -ENT: no congestion, no difficulty swallowing  -Pulmonary: no cough, no shortness of breath  -Cardiac: no chest pain, no palpitations  -Gastrointestinal: no abdominal pain, no nausea, no vomiting, and no diarrhea.  -Genitourinary: no blood or pain with urination  -Musculoskeletal: no back or neck pain  -Skin: no rashes  -Endocrine: No h/o diabetes or thyroid disease  -Neurologic: slurred speech, no weakness/numbness    PSH/PMH as noted above    On Physical Exam:  General: elderly, appears in NAD, speaking clearly and in full sentences (somewhat hard of hearing but appears to understand most questions/commands easily). [note: per triage, patient appeared to them to have difficulty speaking still so code stroke activated; patient met at CT scan with work-up in progress with neurology resident at bedside, rapid CT -brain performed and then more detailed examination obtained; at this point, symptoms appear to have resolved and TPA was not indicated or administered.]  HEENT: PERRL, MMM  Neck: no neck tenderness, no nuchal rigidity  Cardiac: normal s1, s2; RRR; no MGR  Lungs: CTABL  Abdomen: soft nontender/nondistended  : no bladder tenderness or distension  Skin: intact, no rash  Extremities: no peripheral edema, no gross deformities  Neuro: no gross neurologic deficits; CN ii-xii grossly intact, 5/5 str in all extremities, no gross areas of loss of sensation in extremities, no pronator drift.  gait assessment deferred at this time (has poor baseline gait and uses walker per family)    MDM: code stroke; difficulty speaking that has resolved; more likely TIA; CT brain with no evidence of acute hemorrhage; per d/w neuro service, obtain labs: cbc (r/o anemia) CMP (to evaluate for electrolyte abnormalities or renal/liver dysfunction) and obtain CTA head/neck to assess for vascular disease; if no significant vascular disease on CTA can be discharged on aspirin/plavix regiment and have close interval f/u with neurology as outpatient for further w/u.

## 2020-06-27 NOTE — ED PROVIDER NOTE - CARE PROVIDER_API CALL
Salvatore Galicia  NEUROLOGY  [address] [facility]  [place] [address]  Phone: [phone]  Fax: [phone]  Follow Up Time: 4-6 Days

## 2020-06-27 NOTE — ED ADULT NURSE NOTE - OBJECTIVE STATEMENT
89 yo female with a PMH of HTN, colon Ca two years ago s/p resection presents to the ED via EMS from home complaining of new onset of slurred speech that began at around 1230. Daughter is at bedside states patient woke up normal, went to go visit family and had sudden slurred speech. Per daughter, brother had witnessed episode of slurred speech and states it lasted about an hour when they called EMS. States that patient had experienced a similar episode that lasted about twelve hours in the past. Upon arrival, code stroke initiated by triage nurse. Patient is having mild difficulty with words, following commands. 2 RNs having difficulty obtaining a PIV, MDs aware and notified--OK to scan without contrast at this time. Denies headache, dizziness, vision changes, chest pain, shortness of breath, abdominal pain, nausea, vomiting, diarrhea, fevers, chills, dysuria, hematuria, recent illness travel or fall.

## 2020-06-27 NOTE — ED PROVIDER NOTE - NSFOLLOWUPINSTRUCTIONS_ED_ALL_ED_FT
1. Follow up with your doctor in 1-2 days. Additionally please follow up with neurologist Dr. Galicia within 2-3 days for further evaluation/management. You will need an MRI of your brain to be performed within the next 1 week. Please discuss this with Dr. Galicia.  2. Rest, keep self well hydrated. Continue home medications as prescribed, Stroke education packet was given to you for further information.  3. Start Plavix 75 mg daily. Take baby aspirin 81 mg daily. Start Atorvastatin 80 mg daily.   4. Return for any weakness, numbness, change in speech or vision, problems walking or any other concerns

## 2020-06-27 NOTE — ED PROVIDER NOTE - PATIENT PORTAL LINK FT
You can access the FollowMyHealth Patient Portal offered by [facility] by registering at the following website: [email or web address]. By joining [facility]’s FollowMyHealth portal, you will also be able to view your health information using other applications (apps) compatible with our system.

## 2020-06-27 NOTE — ED ADULT NURSE NOTE - NSIMPLEMENTINTERV_GEN_ALL_ED
Implemented All Fall with Harm Risk Interventions:  [place] to call system. Call bell, personal items and telephone within reach. Instruct patient to call for assistance. Room bathroom lighting operational. Non-slip footwear when patient is off stretcher. Physically safe environment: no spills, clutter or unnecessary equipment. Stretcher in lowest position, wheels locked, appropriate side rails in place. Provide visual cue, wrist band, yellow gown, etc. Monitor gait and stability. Monitor for mental status changes and reorient to person, place, and time. Review medications for side effects contributing to fall risk. Reinforce activity limits and safety measures with patient and family. Provide visual clues: red socks.

## 2020-06-27 NOTE — ED PROVIDER NOTE - PROGRESS NOTE DETAILS
No acute stroke on CTH. Sxs improving, pt back to baseline. Neuro recommending CTA head/neck, if negative then tx as high risk w/ 300 mg plavix load, permissive HTN in the meantime. Will continue to monitor. 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 D/w neurology resident Talat Tavares, he reviewed scan recommended loading dose of 300 mg plavix and baby ASA and d/w home w/ outpatient f/u with Dr. Galicia within 1 week for MRI. Will d/w attending and pt. - Emile Rahman PA-C Pt and family made aware of all results and need for outpt f/u in next 1 week. Neuro requesting ASA 81 mg, plavix 75 mg and atorvastatin 80 mg daily. D/w attending, stable for d/c. - Emile Rahman PA-C [employer] [facility] called per family and pt request, confirmed address of [address] [place] [address] [place] TARANGO per daughter Tana and pt request. Pt feels comfortable taking cab home, family is there to receive pt. - JOHANA HansenC

## 2020-06-27 NOTE — ED PROVIDER NOTE - OBJECTIVE STATEMENT
89 yo female PMhx HTN, colon CA 2 years ago s/p surgical resection presents to the ED for episode of slurred speech which began ~1.5 hours prior to arrival. 89 yo female PMhx HTN, colon CA 2 years ago s/p surgical resection presents to the ED for episode of slurred speech which began ~1.5 hours prior to arrival. Hx provided by daughter at bedside, states pt was at other daughter's house, woke up w/ normal speech and then daughter noted slurred speech for about 45 minutes, called EMS. Reports similar episode of this in the past which she was here for, resolved after ~12 hours. Daughter at bedside states pt has been c/o feeling "unwell" the last week, went to PMD yesterday, BP was noted to be 170/80 so losartan dose was doubled to 100mg. Pt states felt like her speech was slurred, had difficulty getting words out. Denies cp, sob, recent fever, n/v/d, anticoagulant usage, surgery within last 1 month, speech/visual changes, fall, head trauma.

## 2020-06-27 NOTE — CONSULT NOTE ADULT - ASSESSMENT
INCOMPLETE  Assessment:  89y R-handed F with h/o bezoar, macular degeneration, HTN, colon CA & gastric ulcer p/w sudden onset difficulty speaking manifest as slowed prosidy, mild word-finding difficulty and mild dysarthria now resolved back to baseline.     On exam, she is mildly hard of hearing and has some minimal delayed speech prosidy (but daughter says this is baseline?).      LKW: 11:50 AM 20  NIHSS: 4  Baseline MRS: 0  Not a tPA candidate due to due to rapid improvement of symptoms to baseline  Not a thrombectomy candidate due to low NIHSS, rapid improvement of symptoms    CT head showed  changes but no acute infarct    Impression: Sudden onset mild anomia, dysprosidy and dysarthria, now resolved, possibly due to TIA    PLAN:   - Frequent neuro-checks (q4h) VS q4h   - Permissive HTN up to 220/110 for 24-48h from symptom onset   - Dysphagia screen.  If passes When start on dysphagia diet & advance as tolerated   - Head of bed > 30 degrees for aspiration prevention and aspiration precautions ordered.   - STAT CT head non-contrast for change in neuro exam.    - CTA H&N to evaluate for intracranial stenosis.     Secondary prevention of stroke:  -Aspirin 81mg daily indefinately, Plavix loading dose 300 mg now and then 75 mg daily x 3 weeks  -Atorvastatin 80 mg daily (long-term goal LDL < 70)  -Tight glucose control (long-term goal HgbA1c < 6%)  -Stroke education and counseling    Stroke workup:  CT Head non con:  Extensive microvascular ischemic changes involving the periventricular and subcortical white matter. Involutional changes. No major vessel cortical distribution infarct detected. Somewhat high attenuation parasagittal high parietal region lesion consistent with a meningioma seen on the prior imaging study as well 2018 in retrospect slightly more conspicuous compared with the prior.    [] Outpatient MRI brain w/o contrast  [] TTE with bubble study outpatient.     Talat Tavares,   Pending final recs after disc with attending INCOMPLETE  Assessment:  89y R-handed F with h/o bezoar, macular degeneration, HTN, colon CA & gastric ulcer p/w sudden onset difficulty speaking manifest as slowed prosidy, mild word-finding difficulty and mild dysarthria now resolved back to baseline.     On exam, she is mildly hard of hearing and has some minimal delayed speech prosidy (but daughter says this is baseline?).      LKW: 11:50 AM 20  NIHSS: 4  Baseline MRS: 0  Not a tPA candidate due to due to rapid improvement of symptoms to baseline  Not a thrombectomy candidate due to low NIHSS, rapid improvement of symptoms    CT head showed  changes but no acute infarct    Impression: Sudden onset mild anomia, dysprosidy and dysarthria, now resolved, possibly due to TIA    PLAN:   - Frequent neuro-checks (q4h) VS q4h   - Permissive HTN up to 220/110 for 24-48h from symptom onset   - Dysphagia screen.  If passes When start on dysphagia diet & advance as tolerated   - Head of bed > 30 degrees for aspiration prevention and aspiration precautions ordered.   - STAT CT head non-contrast for change in neuro exam.    - CTA H&N to evaluate for intracranial stenosis.     Secondary prevention of stroke:  -Aspirin 81mg daily indefinately, Plavix loading dose 300 mg now and then 75 mg daily x 3 weeks  -Atorvastatin 80 mg daily (long-term goal LDL < 70)  -Tight glucose control (long-term goal HgbA1c < 6%)  -Stroke education and counseling    Stroke workup:  CT Head non con:  Extensive microvascular ischemic changes involving the periventricular and subcortical white matter. Involutional changes. No major vessel cortical distribution infarct detected. Somewhat high attenuation parasagittal high parietal region lesion consistent with a meningioma seen on the prior imaging study as well 2018 in retrospect slightly more conspicuous compared with the prior.    [] F/U CTA H&N to determine need for neurovascular intervention.  [] Outpatient MRI brain w/wo contrast given multiple meningiomas.   [] TTE with bubble study outpatient.   [] F/U with Dr. Cb Tavares, DO Assessment:  89y R-handed F with h/o bezoar, macular degeneration, HTN, colon CA & gastric ulcer p/w sudden onset difficulty speaking manifest as slowed prosidy, mild word-finding difficulty and mild dysarthria now resolved back to baseline.     On exam, she is mildly hard of hearing and has some minimal delayed speech prosidy (but daughter says this is baseline?).      LKW: 11:50 AM 20  NIHSS: 4  Baseline MRS: 0  Not a tPA candidate due to due to rapid improvement of symptoms to baseline  Not a thrombectomy candidate due to low NIHSS, rapid improvement of symptoms    CT head showed  changes but no acute infarct.  CTA H&N demonstrated mild to moderate stenosis of L. PCA, 1.1cm meningioma and multiple thyroid nodules.  B/L ICA patent w/o significant stenosis.     Impression: Sudden onset mild anomia/word-finding difficulty, dysprosody and dysarthria, now resolved, possibly due to TIA    PLAN:   - Frequent neuro-checks (q4h) VS q4h   - Permissive HTN up to 220/110 for 24-48h from symptom onset   - Dysphagia screen.  If passes When start on dysphagia diet & advance as tolerated   - Head of bed > 30 degrees for aspiration prevention and aspiration precautions ordered.   - STAT CT head non-contrast for change in neuro exam.    - CTA H&N to evaluate for intracranial stenosis.     Secondary prevention of stroke:  -Aspirin 81mg daily indefinitely Plavix loading dose 300 mg now and then 75 mg daily x 3 weeks  -Atorvastatin 80 mg daily (long-term goal LDL < 70)  -Tight glucose control (long-term goal HgbA1c < 6%)  -Stroke education and counseling    Stroke workup:  [] Outpatient MRI brain w/wo contrast given multiple meningiomas.   [] TTE with bubble study outpatient.   [] F/U with Dr. Galicia    MISC:  [] Advise outpatient thyroid nodule evaluation including TSH, T3, free T4 etc.-->F.U w/ PCP    DISPO: PT and daughter wish to go home and have outpatient follow up rather than remain for observation as now asymptomatic.     Talat Tavares, DO Assessment:  89y R-handed F with h/o bezoar, macular degeneration, HTN, colon CA & gastric ulcer p/w sudden onset difficulty speaking manifest as slowed prosody mild word-finding difficulty and mild dysarthria now resolved back to baseline.     On exam, she is mildly hard of hearing and has some minimal delayed speech prosody (but daughter says this is baseline?).      LKW: 11:50 AM 20  NIHSS: 4  Baseline MRS: 0  Not a tPA candidate due to due to rapid improvement of symptoms to baseline  Not a thrombectomy candidate due to low NIHSS, rapid improvement of symptoms    CT head showed  changes but no acute infarct.  CTA H&N demonstrated mild to moderate stenosis of L. PCA, 1.1cm meningioma and multiple thyroid nodules.  B/L ICA patent w/o significant stenosis.     Impression: Sudden onset mild anomia/word-finding difficulty, dysprosody and dysarthria, now resolved, possibly due to TIA    PLAN:   - Frequent neuro-checks (q4h) VS q4h   - Permissive HTN up to 220/110 for 24-48h from symptom onset   - Dysphagia screen.  If passes When start on dysphagia diet & advance as tolerated   - Head of bed > 30 degrees for aspiration prevention and aspiration precautions ordered.   - STAT CT head non-contrast for change in neuro exam.    - CTA H&N to evaluate for intracranial stenosis.     Secondary prevention of stroke:  -Aspirin 81mg daily indefinitely Plavix loading dose 300 mg now and then 75 mg daily x 3 weeks  -Atorvastatin 80 mg daily (long-term goal LDL < 70)  -Tight glucose control (long-term goal HgbA1c < 6%)  -Stroke education and counseling    Stroke workup:  [] Outpatient MRI brain w/wo contrast given multiple meningiomas.   [] TTE with bubble study outpatient.   [] F/U with Dr. Frida KEYES:  [] Advise outpatient thyroid nodule evaluation including TSH, T3, free T4 etc.-->F.U w/ PCP    DISPO: PT and daughter wish to go home and have outpatient follow up rather than remain for observation as now asymptomatic.     Talat Tavares, DO

## 2020-06-27 NOTE — ED PROVIDER NOTE - NSTIMEPROVIDERCAREINITIATE_GEN_ER
"[facility], [place]   Neurology Daily Note  2/17/2018    Subjective:  Skin irritation noted near labia by staff.     Objective:    Vitals: /71 (BP Location: Left arm)  Pulse 76  Temp 98.5  F (36.9  C) (Axillary)  Resp 18  Ht 1.676 m (5' 6\")  Wt 59.3 kg (130 lb 11.2 oz)  SpO2 95%  BMI 21.1 kg/m2  General: patient lying in bed without any acute distress  HEENT: NC/AT  Cardiac: RRR  Chest: No respiratory distress  Abdomen: PEG tube in place. Minimal erythema surrounding site. Nontender.  Extremities: No LE edema.    Neurologic:  Mental Status: Awake, alert, smiling. Said \"Hi\" but otherwise nonverbal. Unable to follow commands.  Cranial Nerves: PERRL. Tracking and EOMI. Blinking to threat bilaterally. Facial movements symmetric.  Motor: Tremor noted in RUE and LUE. Increased tone in UE, R>L with lack of tone distally on the right. Moving extremities spontaneously and antigravity  Reflexes: Hyperreflexic throughout R>L. Mariano noted on the right.  No Jaw jerk. Triple flexion on the right  Sensory: Withdrawal to noxious stimuli in all extremities     Assessment/Plan  Ms. 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 vEEG 1/23 showed evidence of NCSE and the patient was transferred to Neuro ICU 1/24 for intubation and sedation. Extubated on 2/11 and now s/p direct jejunostomy tube placement 2/15.      #NCSE: resolved. Etiology unclear. Acute infection from UTI may have been contributing. LP performed on 1/25 and results unremarkable. MRI on admission unremarkable. Repeat MRI on 2/14 was limited due to motion artifact but overall unchanged.  - Continue Keppra 1000 mg BID  - Continue Valproate 1000 mg q8h  - Continue lacosamide 200mg BID  - Versed discontinued since 2/2  - Propofol discontinued since " 1/29  - Fosphenytoin discontinued since 1/29 due to arrhythmia      #Upper extremity spasticity: New since admission. First noted after extubation. MRI of brain and C-spine did not clearly show evidence of causative lesions. Will request EMG to aid in localization and definitive diagnosis.  - EMG inpatient if able (tentatively scheduled for 2/20). If not will schedule as outpatient      #Encephalopathy: appears to be improving. Successfully extubated but unable to follow commands. Post-ictal state likley contributing.  - continue modafinil to 200 mg daily      #Acute respiratory failure: resolved. Intubated for airway protection secondary to NCSE on 1/24 and successfully extubated on 2/11.       #Prolonged QT: short run of Torsades 1/29, likely due to phenytoin toxicity. Now Resolved.   - Limit QT prolonging agents  - Discontinued fosphenytoin  - Goal: Mg >2, K >4      #Chronic pancreatitis s/p TPIAT in 2006  #History of pancreas transplant x2  - Transplant service on board, appreciate assistance. Goal level 3-4.  - Continue MMF and tacrolimus  - Continue pancreatic supplements      #Hypothyroidism  - Levothyroxine 25mcg      #Iron deficiency anemia  - Ferrous sulfate 325mg daily       #Dysphagia: encephalopathy contributing  #s/p direct jejunostomy tube placement 2/15  - Speech consulted and recommending dysphagia 1 diet  - Continue TF      #peptic ulcer disease s/p partial gastrectomy in 1984 and Billroth II in 1997  #History of malnutrition and anorexia  - Monitor electrolytes  - Continue thiamine   - Continue B12      #Diarrhea: stable. Patient recently received treatment for C diff. PCR on 1/22 was negative. Repeat on 1/26 negative. Stool cultures negative. Able to take off enteric isolation as PCR has been negative for 1 month.      #Left tibia/fibula fracture: patient was scheduled for cast removal on 1/22 as an outpatient with f/u xrays and placement of non weight bearing cast.  - Ortho consulted, appreciate  assistance   - Repeating Left tib/fib films  - Follow up in 2 weeks and likely place CAM boot at that time      #Multiple facial and scalp sores:improving. Noted from where EEG wires were attached.  #Skin irritation near labia  - Wound RN consulted  - Desonide for skin irritation      FEN: 1/2 NS TKO  PPX:    DVT prophylaxis: SCDs (hold lovenox for 3 days post procedure)    GI: Ranitidine 150mg BID  Lines: la 2/4, rectal 2/1, PICC line 2/8   Code Status: DNR. No chest compressions however after discussion with RACHEL Leary, patient would proceed with any shocks required for abnormal heart rhythm.       Patient seen and discussed with Dr. Maira, attending.      Mathew Julio MD  Neurology PGY2  [phone]   27-Jun-2020 13:34

## 2020-06-27 NOTE — ED PROVIDER NOTE - CRITICAL CARE INDICATION, MLM
patient was critically ill... CODE STROKE: Patient was critically ill with a high probability of imminent or life threatening deterioration.

## 2020-06-28 LAB
CULTURE RESULTS: SIGNIFICANT CHANGE UP
SPECIMEN SOURCE: SIGNIFICANT CHANGE UP

## 2022-10-27 NOTE — DISCHARGE NOTE ADULT - NS AS DC STROKE PERSONAL RISK FACTORS
Dear Dr. Perkins Leader,     We had the pleasure of evaluating the following patient for physical therapy services at [address]. A summary of our findings can be found in the initial assessment below. This includes our plan of care. If you have any questions or concerns regarding these findings, please do not hesitate to contact me at the office phone number above.   Thank you for the referral.       Physician/Provider Signature:_______________________________Date:__________________  By signing above (or electronic signature), therapist's plan is approved by physician            Physical Therapy Daily Treatment Note    [x]Daily Treatment Note    [x]Progress Note/ Re-evaluation    [] Discharge Summary         Date:  10/27/2022    Patient Name:  Dulce Li    :  1990  MRN: [id number]      Medical/Treatment Diagnosis Information:  Diagnosis: C34.688A, X23.056L, F43.001R  Treatment Diagnosis: LE weakness, abnormality of gait, imbalance    Insurance/Certification information:  PT Insurance Information: [facility] approved for 2x/week x 8 weeks (16 visits) from 10/3/22 to 22    Physician Information:  Referring Provider (secondary): Dr. Bran Carter of care sent to provider:      [x]Faxed   []Co-signature    (attempts: 1[x]  10/27/22   2[] 3[])       Plan of care signed :  []  Yes  [x] No  []  Cosign [x]  Fax    Date of Patient follow up with Physician: before end of year, pt will check     Is this a Progress Report:     [x]  Yes  []  No      If Yes:  Date Range for reporting period:  Beginning 10/3/22  Ending 10/27/22    Progress report will be due (10 Rx or 30 days whichever is less): 04/21/10      Recertification will be due (POC Duration  / 90 days whichever is less): 23 or 16th visit         Visit # Insurance Allowable Auth Required     1 visit until 10/3/22  New  approved for 2x/week x 8 weeks from 10/3/22 to 22 [x]  Yes []  No Functional Scale/Score:  Measure Used:     NDI  Score:      5/50            Date Assessed:  10/3/22           Latex Allergy:  [x]NO      []YES  Preferred Language for Healthcare:   [x]English       []other:    RESTRICTIONS/PRECAUTIONS:  SCI, neck surgery with fusion C3-7     SUBJECTIVE:    Pt states he uses cane 80% of time and without cane maybe 20% of time. No steps at his home but his SO has 1 step to enter and then flight of steps in home to second floor     Patient goal for therapy:   \"to get back to work and get stronger with legs and walking\"      Pain level:  same as initial eval on 10/3/22, see below  At eval:      intermittent pressure lower cervical and into traps  Patient reports pain is  1/10 pain at present and  3/10 pain at its worst.    Plan Moving Forward/ For next visit:    Have pt fill out LEFS next visit   Progress trunk strength and stability  Progress LE strength and flexibility  Balance, gait, steps as able             OBJECTIVE:   Re-evaluation:  957- 1022 25 minutes      Movement Left MMT Right MMT   Comments        PROM   Hip Flexion 3-  2+  2  2       Hip Extension  2+  2+ Tested in prone      Hip Abduction 2+  2+ 2+  2   Tested in SL      Hip Adduction 4  3- 4  2+ Tested in prone      Hip Internal Rotation           Hip External Rotation  4 4        Knee Flexion 5  4   4-  3- Tested in prone      Knee Extension 5  5 4  4       Ankle Dorsiflexion 4+  4+ 2+  2+       Ankle Plantarflexion 2+  2 3-  3-   Tested in standing with single leg heel raise       Ankle Inversion 4+  4- 3-  3+         Ankle Eversion 4+  4+ 3-  3+         Assessed Tinetti  Tinetti re-assessed 10/27/22   Balance Score: 10  Gait Score: 6  Tinetti Total Score: 16     Gait: pt ambulates with SPC, slow nimo, decreased knee extension B, forward posture  Steps:  pt able to do 4 steps in dept with one rail and SPC with reciprocal pattern, but with slow nimo, decreased full knee extension on stance leg. Exercises/Interventions:     Exercises in bold performed in department today. Items not bolded are carried forward from prior visits for continuity of the record. Exercise/Equipment hold sets reps resistance Comments/cues HEP              THERAPEUTIC EXERCISE      []   SLR supine      [x]   Bridges with tband for glut med activation      [x]   Standing heel raises      [x]   Standing hip abduction B      Can step to R if unable to do kick  [x]         []         []           []           []             []   NEUROMUSCULAR RE-ED      []           []           []           []           []           []           []           []     Therapeutic Exercise/Home Exercise Program:   13 minutes   [id number]  HEP has been established, See above, pt to bring in his ex folder and exs previously given last visit    Pt inst in role of PT, prognosis, plan of care, activity modification, and benefits of therapy. Discussed aquatic therapy       Therapeutic Activity:  0 minutes     Gait: 0 minutes    Neuromuscular Re-Education:  10 minutes  [id number]  Sitting balance activities         Canalith Repositioning Procedure:  0 minutes     Manual Therapy:  0 minutes    Modalities: 0 minutes          ASSESSMENT:  Pt will benefit from skilled PT to improve strength, balance, gait and function. GOALS: Goals established 10/27/22     Patient stated goal:  \"to get back to work and get stronger with legs and walking\"  [] Progressing: [] Met: [] Not Met: [] Adjusted    Therapist goals for Patient:   Short Term Goals: To be achieved in:  4 weeks   - Independent in HEP and progression per patient tolerance, in order to prevent re-injury. [] Progressing: [] Met: [] Not Met: [] Adjusted  - Improve strength by 1/3 grade in weak areas to allow for proper functional mobility as indicated by patients Functional Deficits. [] Progressing: [] Met: [] Not Met: [] Adjusted      Long Term Goals:  To be achieved in:  8 weeks   - LEFS score 60/80 or better to assist with reaching prior level of function. [] Progressing: [] Met: [] Not Met: [] Adjusted   - Patient will have a decrease in pain to facilitate improvement in movement, function, and ADLs as indicated by Functional Deficits. [] Progressing: [] Met: [] Not Met: [] Adjusted  [x] NOT APPLICABLE    - Patient will demonstrate increased AROM to [facility] to allow for proper joint functioning as indicated by patients Functional Deficits. [] Progressing: [] Met: [] Not Met: [] Adjusted   - Improve strength by 2/3 grade or better in weak areas to allow for proper functional mobility as indicated by patients Functional Deficits. [] Progressing: [] Met: [] Not Met: [] Adjusted   - improve Tinetti score to 22/28 or better   [] Progressing: [] Met: [] Not Met: [] Adjusted   - Stair goal: pt able to do flight of steps with reciprocal pattern and SPC with improved technique   [] Progressing: [] Met: [] Not Met: [] Adjusted       Overall Progression Towards Functional goals/ Treatment Progress Update:  [] Patient is progressing as expected towards functional goals listed. [] Progression is slowed due to complexities/Impairments listed. [] Progression has been slowed due to co-morbidities.   [x] Plan just implemented, too soon to assess goals progression <30days   [] Goals require adjustment due to lack of progress  [] Patient is not progressing as expected and requires additional follow up with physician  [] Patient has met goals as marked above   [] Other    Prognosis for POC: [x] Good [] Fair  [] Poor    Patient requires continued skilled intervention: [x] Yes  [] No    Treatment/Activity Tolerance:  [x] Patient able to complete treatment  [] Patient limited by fatigue  [] Patient limited by pain    [] Patient limited by other medical complications  [] Other:         PLAN:   [] Continue per plan of care [] Alter current plan (see comments above)  [x] Plan of care initiated [] Hold pending MD visit [] Discharge Therapeutic Exercise and NMR EXR  [x] (61879) Provided verbal/tactile cueing for activities related to strengthening, flexibility, endurance, ROM  for improvements in proximal strength and core control with self care, mobility, lifting and ambulation. [x] (19549) Provided verbal/tactile cueing for activities related to improving balance, coordination, kinesthetic sense, posture, motor skill, proprioception  to assist with core control in self care, mobility, lifting, and ambulation. Therapeutic Activities and Gait:    [] (41835 or 98184) Provided verbal/tactile cueing for activities related to improving balance, coordination, kinesthetic sense, posture, motor skill, proprioception and motor activation to allow for proper function  with self care and ADLs  [] (50232) Provided training and instruction to the patient for proper core and proximal hip recruitment and positioning with ambulation re-education     Home Exercise Program:    [x] (16636) Reviewed/Progressed HEP activities related to strengthening, flexibility, endurance, ROM of core, proximal hip and LE for functional self-care, mobility, lifting and ambulation   [] (32056) Reviewed/Progressed HEP activities related to improving balance, coordination, kinesthetic sense, posture, motor skill, proprioception of core, proximal hip and LE for self care, mobility, lifting, and ambulation      Manual Treatments:  PROM / STM / Oscillations-Mobs:  G-I, II, III, IV (PA's, Inf., Post.)  [] (79252) Provided manual therapy to mobilize soft tissue/joints for the purpose of modulating pain, promoting relaxation,  increasing ROM, reducing/eliminating soft tissue swelling/inflammation/restriction, improving soft tissue extensibility and allowing for proper ROM for normal function with self care, mobility, lifting and ambulation.      CRP:  [] (60298) Canalith Repositioning procedure for the assessment, treatment and education of BPPV    Modalities:   [] Ultrasound   [] Estim    [] Mechanical traction    [] Vaso-pneumatic device   [] Ionto     Charges:  Timed Code Treatment Minutes: 23   Total Treatment Minutes: 48     Medicare Cap total YTD:      [x]N/A      [] $   Workers Comp Time Stamp      (Per CPT and Total Treatment)    Time In: 524   Time Out: 2921       [x] EVAL     [] Dry Needling  [x] BI(00575)   x  1   [] EStim Unattended 04232  [x] NMR (02064)  x  1  [] Estim Attended  66080  [] Manual (02617)  x      [] Mechanical Txn 60353  [] TA    x     [] Ultrasound  [] Gait   x                    [] Vaso  [] CRP    [] Ionto           [] Other:      Electronically signed by: Ninoska Scott PT , OMT-C, [id number]        Note: If patient does not return for scheduled/ recommended follow up visits, this note will serve as a discharge from care along with most recent update on progress. high blood pressure/history of a Stroke or TIA

## 2023-02-17 ENCOUNTER — INPATIENT (INPATIENT)
Facility: HOSPITAL | Age: 88
LOS: 0 days | Discharge: HOME | DRG: 189 | End: 2023-02-18
Attending: GENERAL ACUTE CARE HOSPITAL | Admitting: GENERAL ACUTE CARE HOSPITAL
Payer: COMMERCIAL

## 2023-02-17 VITALS
TEMPERATURE: 98 F | WEIGHT: 119.93 LBS | HEIGHT: 60 IN | RESPIRATION RATE: 18 BRPM | HEART RATE: 72 BPM | OXYGEN SATURATION: 95 % | SYSTOLIC BLOOD PRESSURE: 189 MMHG | DIASTOLIC BLOOD PRESSURE: 105 MMHG

## 2023-02-17 DIAGNOSIS — E78.5 HYPERLIPIDEMIA, UNSPECIFIED: ICD-10-CM

## 2023-02-17 DIAGNOSIS — F03.90 UNSPECIFIED DEMENTIA, UNSPECIFIED SEVERITY, WITHOUT BEHAVIORAL DISTURBANCE, PSYCHOTIC DISTURBANCE, MOOD DISTURBANCE, AND ANXIETY: ICD-10-CM

## 2023-02-17 DIAGNOSIS — Z90.49 ACQUIRED ABSENCE OF OTHER SPECIFIED PARTS OF DIGESTIVE TRACT: Chronic | ICD-10-CM

## 2023-02-17 DIAGNOSIS — I50.9 HEART FAILURE, UNSPECIFIED: ICD-10-CM

## 2023-02-17 DIAGNOSIS — J96.01 ACUTE RESPIRATORY FAILURE WITH HYPOXIA: ICD-10-CM

## 2023-02-17 DIAGNOSIS — I10 ESSENTIAL (PRIMARY) HYPERTENSION: ICD-10-CM

## 2023-02-17 DIAGNOSIS — Z90.710 ACQUIRED ABSENCE OF BOTH CERVIX AND UTERUS: Chronic | ICD-10-CM

## 2023-02-17 DIAGNOSIS — K27.9 PEPTIC ULCER, SITE UNSPECIFIED, UNSPECIFIED AS ACUTE OR CHRONIC, WITHOUT HEMORRHAGE OR PERFORATION: ICD-10-CM

## 2023-02-17 LAB
ALBUMIN SERPL ELPH-MCNC: 4 G/DL — SIGNIFICANT CHANGE UP (ref 3.3–5)
ALP SERPL-CCNC: 237 U/L — HIGH (ref 40–120)
ALT FLD-CCNC: 19 U/L — SIGNIFICANT CHANGE UP (ref 10–45)
ANION GAP SERPL CALC-SCNC: 14 MMOL/L — SIGNIFICANT CHANGE UP (ref 5–17)
APPEARANCE UR: CLEAR — SIGNIFICANT CHANGE UP
AST SERPL-CCNC: 25 U/L — SIGNIFICANT CHANGE UP (ref 10–40)
BASE EXCESS BLDV CALC-SCNC: 2.9 MMOL/L — SIGNIFICANT CHANGE UP (ref -2–3)
BASOPHILS # BLD AUTO: 0.06 K/UL — SIGNIFICANT CHANGE UP (ref 0–0.2)
BASOPHILS NFR BLD AUTO: 0.8 % — SIGNIFICANT CHANGE UP (ref 0–2)
BILIRUB SERPL-MCNC: 0.4 MG/DL — SIGNIFICANT CHANGE UP (ref 0.2–1.2)
BILIRUB UR-MCNC: NEGATIVE — SIGNIFICANT CHANGE UP
BUN SERPL-MCNC: 10 MG/DL — SIGNIFICANT CHANGE UP (ref 7–23)
CA-I SERPL-SCNC: 1.24 MMOL/L — SIGNIFICANT CHANGE UP (ref 1.15–1.33)
CALCIUM SERPL-MCNC: 9.6 MG/DL — SIGNIFICANT CHANGE UP (ref 8.4–10.5)
CHLORIDE BLDV-SCNC: 101 MMOL/L — SIGNIFICANT CHANGE UP (ref 96–108)
CHLORIDE SERPL-SCNC: 102 MMOL/L — SIGNIFICANT CHANGE UP (ref 96–108)
CO2 BLDV-SCNC: 30 MMOL/L — HIGH (ref 22–26)
CO2 SERPL-SCNC: 22 MMOL/L — SIGNIFICANT CHANGE UP (ref 22–31)
COLOR SPEC: SIGNIFICANT CHANGE UP
CREAT SERPL-MCNC: 0.39 MG/DL — LOW (ref 0.5–1.3)
DIFF PNL FLD: NEGATIVE — SIGNIFICANT CHANGE UP
EGFR: 91 ML/MIN/1.73M2 — SIGNIFICANT CHANGE UP
EOSINOPHIL # BLD AUTO: 0.28 K/UL — SIGNIFICANT CHANGE UP (ref 0–0.5)
EOSINOPHIL NFR BLD AUTO: 3.5 % — SIGNIFICANT CHANGE UP (ref 0–6)
FLUAV AG NPH QL: SIGNIFICANT CHANGE UP
FLUBV AG NPH QL: SIGNIFICANT CHANGE UP
GAS PNL BLDV: 134 MMOL/L — LOW (ref 136–145)
GAS PNL BLDV: SIGNIFICANT CHANGE UP
GAS PNL BLDV: SIGNIFICANT CHANGE UP
GLUCOSE BLDV-MCNC: 86 MG/DL — SIGNIFICANT CHANGE UP (ref 70–99)
GLUCOSE SERPL-MCNC: 93 MG/DL — SIGNIFICANT CHANGE UP (ref 70–99)
GLUCOSE UR QL: NEGATIVE — SIGNIFICANT CHANGE UP
HCO3 BLDV-SCNC: 29 MMOL/L — SIGNIFICANT CHANGE UP (ref 22–29)
HCT VFR BLD CALC: 42.3 % — SIGNIFICANT CHANGE UP (ref 34.5–45)
HCT VFR BLDA CALC: 42 % — SIGNIFICANT CHANGE UP (ref 34.5–46.5)
HGB BLD CALC-MCNC: 14 G/DL — SIGNIFICANT CHANGE UP (ref 11.7–16.1)
HGB BLD-MCNC: 13.6 G/DL — SIGNIFICANT CHANGE UP (ref 11.5–15.5)
IMM GRANULOCYTES NFR BLD AUTO: 0.4 % — SIGNIFICANT CHANGE UP (ref 0–0.9)
KETONES UR-MCNC: NEGATIVE — SIGNIFICANT CHANGE UP
LACTATE BLDV-MCNC: 1.4 MMOL/L — SIGNIFICANT CHANGE UP (ref 0.5–2)
LEUKOCYTE ESTERASE UR-ACNC: NEGATIVE — SIGNIFICANT CHANGE UP
LYMPHOCYTES # BLD AUTO: 1.3 K/UL — SIGNIFICANT CHANGE UP (ref 1–3.3)
LYMPHOCYTES # BLD AUTO: 16.3 % — SIGNIFICANT CHANGE UP (ref 13–44)
MCHC RBC-ENTMCNC: 27.5 PG — SIGNIFICANT CHANGE UP (ref 27–34)
MCHC RBC-ENTMCNC: 32.2 GM/DL — SIGNIFICANT CHANGE UP (ref 32–36)
MCV RBC AUTO: 85.6 FL — SIGNIFICANT CHANGE UP (ref 80–100)
MONOCYTES # BLD AUTO: 0.57 K/UL — SIGNIFICANT CHANGE UP (ref 0–0.9)
MONOCYTES NFR BLD AUTO: 7.2 % — SIGNIFICANT CHANGE UP (ref 2–14)
NEUTROPHILS # BLD AUTO: 5.73 K/UL — SIGNIFICANT CHANGE UP (ref 1.8–7.4)
NEUTROPHILS NFR BLD AUTO: 71.8 % — SIGNIFICANT CHANGE UP (ref 43–77)
NITRITE UR-MCNC: NEGATIVE — SIGNIFICANT CHANGE UP
NRBC # BLD: 0 /100 WBCS — SIGNIFICANT CHANGE UP (ref 0–0)
NT-PROBNP SERPL-SCNC: 586 PG/ML — HIGH (ref 0–300)
PCO2 BLDV: 49 MMHG — HIGH (ref 39–42)
PH BLDV: 7.38 — SIGNIFICANT CHANGE UP (ref 7.32–7.43)
PH UR: 7.5 — SIGNIFICANT CHANGE UP (ref 5–8)
PLATELET # BLD AUTO: 230 K/UL — SIGNIFICANT CHANGE UP (ref 150–400)
PO2 BLDV: 36 MMHG — SIGNIFICANT CHANGE UP (ref 25–45)
POTASSIUM BLDV-SCNC: 3.5 MMOL/L — SIGNIFICANT CHANGE UP (ref 3.5–5.1)
POTASSIUM SERPL-MCNC: 3.9 MMOL/L — SIGNIFICANT CHANGE UP (ref 3.5–5.3)
POTASSIUM SERPL-SCNC: 3.9 MMOL/L — SIGNIFICANT CHANGE UP (ref 3.5–5.3)
PROCALCITONIN SERPL-MCNC: <0.03 NG/ML — SIGNIFICANT CHANGE UP (ref 0.02–0.1)
PROT SERPL-MCNC: 6.7 G/DL — SIGNIFICANT CHANGE UP (ref 6–8.3)
PROT UR-MCNC: NEGATIVE — SIGNIFICANT CHANGE UP
RBC # BLD: 4.94 M/UL — SIGNIFICANT CHANGE UP (ref 3.8–5.2)
RBC # FLD: 14.2 % — SIGNIFICANT CHANGE UP (ref 10.3–14.5)
RSV RNA NPH QL NAA+NON-PROBE: SIGNIFICANT CHANGE UP
SAO2 % BLDV: 55.7 % — LOW (ref 67–88)
SARS-COV-2 RNA SPEC QL NAA+PROBE: SIGNIFICANT CHANGE UP
SODIUM SERPL-SCNC: 138 MMOL/L — SIGNIFICANT CHANGE UP (ref 135–145)
SP GR SPEC: 1.01 — LOW (ref 1.01–1.02)
TROPONIN T, HIGH SENSITIVITY RESULT: 11 NG/L — SIGNIFICANT CHANGE UP (ref 0–51)
TROPONIN T, HIGH SENSITIVITY RESULT: 12 NG/L — SIGNIFICANT CHANGE UP (ref 0–51)
UROBILINOGEN FLD QL: NEGATIVE — SIGNIFICANT CHANGE UP
WBC # BLD: 7.97 K/UL — SIGNIFICANT CHANGE UP (ref 3.8–10.5)
WBC # FLD AUTO: 7.97 K/UL — SIGNIFICANT CHANGE UP (ref 3.8–10.5)

## 2023-02-17 PROCEDURE — 99223 1ST HOSP IP/OBS HIGH 75: CPT

## 2023-02-17 PROCEDURE — 71260 CT THORAX DX C+: CPT | Mod: 26

## 2023-02-17 PROCEDURE — 99285 EMERGENCY DEPT VISIT HI MDM: CPT | Mod: FS

## 2023-02-17 PROCEDURE — 71045 X-RAY EXAM CHEST 1 VIEW: CPT | Mod: 26

## 2023-02-17 RX ORDER — RANITIDINE HYDROCHLORIDE 150 MG/1
0 TABLET, FILM COATED ORAL
Qty: 0 | Refills: 0 | DISCHARGE

## 2023-02-17 RX ORDER — FUROSEMIDE 40 MG
20 TABLET ORAL DAILY
Refills: 0 | Status: DISCONTINUED | OUTPATIENT
Start: 2023-02-17 | End: 2023-02-18

## 2023-02-17 RX ORDER — ACETAMINOPHEN 500 MG
650 TABLET ORAL EVERY 6 HOURS
Refills: 0 | Status: DISCONTINUED | OUTPATIENT
Start: 2023-02-17 | End: 2023-02-18

## 2023-02-17 RX ORDER — LOSARTAN POTASSIUM 100 MG/1
100 TABLET, FILM COATED ORAL DAILY
Refills: 0 | Status: DISCONTINUED | OUTPATIENT
Start: 2023-02-17 | End: 2023-02-18

## 2023-02-17 RX ORDER — PANTOPRAZOLE SODIUM 20 MG/1
40 TABLET, DELAYED RELEASE ORAL
Refills: 0 | Status: DISCONTINUED | OUTPATIENT
Start: 2023-02-17 | End: 2023-02-18

## 2023-02-17 RX ORDER — SERTRALINE 25 MG/1
1 TABLET, FILM COATED ORAL
Qty: 0 | Refills: 0 | DISCHARGE

## 2023-02-17 RX ORDER — IPRATROPIUM/ALBUTEROL SULFATE 18-103MCG
3 AEROSOL WITH ADAPTER (GRAM) INHALATION EVERY 6 HOURS
Refills: 0 | Status: DISCONTINUED | OUTPATIENT
Start: 2023-02-17 | End: 2023-02-18

## 2023-02-17 RX ORDER — METOPROLOL TARTRATE 50 MG
50 TABLET ORAL DAILY
Refills: 0 | Status: DISCONTINUED | OUTPATIENT
Start: 2023-02-17 | End: 2023-02-18

## 2023-02-17 RX ORDER — ASPIRIN/CALCIUM CARB/MAGNESIUM 324 MG
81 TABLET ORAL DAILY
Refills: 0 | Status: DISCONTINUED | OUTPATIENT
Start: 2023-02-17 | End: 2023-02-18

## 2023-02-17 RX ORDER — ATORVASTATIN CALCIUM 80 MG/1
10 TABLET, FILM COATED ORAL AT BEDTIME
Refills: 0 | Status: DISCONTINUED | OUTPATIENT
Start: 2023-02-17 | End: 2023-02-18

## 2023-02-17 RX ORDER — POTASSIUM CHLORIDE 20 MEQ
20 PACKET (EA) ORAL ONCE
Refills: 0 | Status: COMPLETED | OUTPATIENT
Start: 2023-02-17 | End: 2023-02-17

## 2023-02-17 RX ORDER — ACETYLCYSTEINE 200 MG/ML
4 VIAL (ML) MISCELLANEOUS EVERY 12 HOURS
Refills: 0 | Status: DISCONTINUED | OUTPATIENT
Start: 2023-02-17 | End: 2023-02-18

## 2023-02-17 RX ORDER — LANOLIN ALCOHOL/MO/W.PET/CERES
3 CREAM (GRAM) TOPICAL AT BEDTIME
Refills: 0 | Status: DISCONTINUED | OUTPATIENT
Start: 2023-02-17 | End: 2023-02-18

## 2023-02-17 RX ORDER — SERTRALINE 25 MG/1
100 TABLET, FILM COATED ORAL DAILY
Refills: 0 | Status: DISCONTINUED | OUTPATIENT
Start: 2023-02-17 | End: 2023-02-18

## 2023-02-17 RX ORDER — ONDANSETRON 8 MG/1
4 TABLET, FILM COATED ORAL EVERY 8 HOURS
Refills: 0 | Status: DISCONTINUED | OUTPATIENT
Start: 2023-02-17 | End: 2023-02-18

## 2023-02-17 RX ORDER — AMLODIPINE BESYLATE 2.5 MG/1
5 TABLET ORAL DAILY
Refills: 0 | Status: DISCONTINUED | OUTPATIENT
Start: 2023-02-17 | End: 2023-02-18

## 2023-02-17 RX ORDER — SODIUM CHLORIDE 9 MG/ML
4 INJECTION INTRAMUSCULAR; INTRAVENOUS; SUBCUTANEOUS EVERY 12 HOURS
Refills: 0 | Status: DISCONTINUED | OUTPATIENT
Start: 2023-02-17 | End: 2023-02-18

## 2023-02-17 RX ORDER — MULTIVIT-MIN/FERROUS GLUCONATE 9 MG/15 ML
1 LIQUID (ML) ORAL
Qty: 0 | Refills: 0 | DISCHARGE

## 2023-02-17 RX ORDER — ESTROGENS, CONJUGATED 0.625 MG/G
1 CREAM WITH APPLICATOR VAGINAL
Qty: 0 | Refills: 0 | DISCHARGE

## 2023-02-17 RX ORDER — GUAIFENESIN/DEXTROMETHORPHAN 600MG-30MG
10 TABLET, EXTENDED RELEASE 12 HR ORAL EVERY 4 HOURS
Refills: 0 | Status: DISCONTINUED | OUTPATIENT
Start: 2023-02-17 | End: 2023-02-18

## 2023-02-17 RX ORDER — FUROSEMIDE 40 MG
20 TABLET ORAL ONCE
Refills: 0 | Status: COMPLETED | OUTPATIENT
Start: 2023-02-17 | End: 2023-02-17

## 2023-02-17 RX ORDER — AMLODIPINE BESYLATE 2.5 MG/1
5 TABLET ORAL ONCE
Refills: 0 | Status: COMPLETED | OUTPATIENT
Start: 2023-02-17 | End: 2023-02-17

## 2023-02-17 RX ORDER — ENOXAPARIN SODIUM 100 MG/ML
40 INJECTION SUBCUTANEOUS EVERY 24 HOURS
Refills: 0 | Status: DISCONTINUED | OUTPATIENT
Start: 2023-02-17 | End: 2023-02-18

## 2023-02-17 RX ADMIN — Medication 3 MILLILITER(S): at 23:43

## 2023-02-17 RX ADMIN — ENOXAPARIN SODIUM 40 MILLIGRAM(S): 100 INJECTION SUBCUTANEOUS at 23:42

## 2023-02-17 RX ADMIN — ATORVASTATIN CALCIUM 10 MILLIGRAM(S): 80 TABLET, FILM COATED ORAL at 23:43

## 2023-02-17 RX ADMIN — Medication 20 MILLIEQUIVALENT(S): at 19:27

## 2023-02-17 RX ADMIN — AMLODIPINE BESYLATE 5 MILLIGRAM(S): 2.5 TABLET ORAL at 19:27

## 2023-02-17 RX ADMIN — Medication 20 MILLIGRAM(S): at 19:27

## 2023-02-17 NOTE — H&P ADULT - NSHPPHYSICALEXAM_GEN_ALL_CORE
T(C): 37 (02-17-23 @ 19:15), Max: 37 (02-17-23 @ 19:15)  HR: 69 (02-17-23 @ 20:57) (69 - 72)  BP: 168/93 (02-17-23 @ 21:54) (168/93 - 216/90)  RR: 17 (02-17-23 @ 21:54) (16 - 18)  SpO2: 96% (02-17-23 @ 21:54) (95% - 96%)  GENERAL: NAD, lying in bed, [place]  EYES: poor va given macular degeneration  ENMT: Moist oral mucosa, no pharyngeal injection or exudates   NECK: Supple, no palpable masses; no JVD  RESPIRATORY: Normal respiratory effort; lungs with decreased breath sounds over basilar regions on auscultation bilaterally  CARDIOVASCULAR: Regular rate and rhythm, normal S1 and S2, no murmur/rub/gallop; No lower extremity edema; Peripheral pulses are 2+ bilaterally  ABDOMEN: Nontender to palpation, normoactive bowel sounds, no rebound/guarding   MUSCULOSKELETAL: no joint swelling or tenderness to palpation  PSYCH: A+Ox2; affect appropriate  NEUROLOGY: CN 2-12 are intact and symmetric; no gross motor or sensory deficits   SKIN: No rashes; no palpable lesions

## 2023-02-17 NOTE — ED PROVIDER NOTE - NSICDXPASTMEDICALHX_GEN_ALL_CORE_FT
PAST MEDICAL HISTORY:  Bezoar, sequela     Colon cancer     Gastric ulcer     HTN (hypertension)     Macular degeneration, unspecified laterality, unspecified type

## 2023-02-17 NOTE — H&P ADULT - ASSESSMENT
No abnormal parenchymal or leptomeningeal enhancement. No vasogenic edema.    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    Punctate acute infarct in the left parietotemporal region.    Multiple small meningiomas as detailed in the body the report 89 yo f w pmh [place], macular degeneration, mci/dementia, ?meningiomas, htn, hld, cva, pud, colon ca s/p resection, bezoar, p/w generalized weakness/fatigue/lethargy, cough, congestion, sob, found to be in ahrf (req 2-3 LPM via NC) with uncontrolled htn, admitted to medicine for further mgmt

## 2023-02-17 NOTE — ED PROVIDER NOTE - PROGRESS NOTE DETAILS
Discussed with patient's PMD Dr. Cole.  If able to control blood pressure patient can be discharged with increase in Lasix as well as potassium replacement and follow-up with cardiology.  If patient requires admission, admit to Dr. Jarvis Spoke with Dr. Guy, reported to him that pt is still hypertensive despite being medicated. Given elevated BP and presenting CC advised admission for further w/u to Dr. Jarvis  Spoke with Dr. Jarvis who accepted pt for admission  Diogo Camarena PA-C Pt follows with [facility] at [facility]  Sees Dr. Kirill Camarena PA-C Pts grandson, Dr. Gomez can also be contacted in the event family is needed  He is a Cardiologist, [phone]  Diogo Camarena PA-C

## 2023-02-17 NOTE — ED PROVIDER NOTE - PHYSICAL EXAMINATION
Attn - alert, NAD, no pallor or jaundice, PERRL 3 mm, moist mm, skin - warm and dry, Lungs - bibasilar rales,  Cor - rr, no M, no rub, Abdo - ND, soft, NT, no HSM, no CVAT, no guarding or rebound. Extremities - non-pitting edema, no calf tenderness, distal pulses intact and symmetrical, Neuro - intact and non-focal

## 2023-02-17 NOTE — ED CLERICAL - DIVISION
[facility]... Per Dr. Del Cid:  [facility] order placed for pt to receive ceftriaxone 2 G Daily for 6 weeks. Weekly PICC dressing change and labs - ESR, CRP, CBC, and CMP. Potential end date of 7/5. Line to be removed upon completion of IV therapy.

## 2023-02-17 NOTE — ED ADULT NURSE NOTE - OBJECTIVE STATEMENT
Patient is 89 year old female complaining of SOB. Patient is A&Ox2- hard of hearing. Daughter at bedside to provide information. Daughter reports last weekend she noticed increased SOB, difficulty breathing with exertion, congestion, cough, fatigue. Patient denies chest pain, fevers, chills, n/v/d. On assessment patient is on room air - 95%, breathing comfortably.

## 2023-02-17 NOTE — ED PROVIDER NOTE - CLINICAL SUMMARY MEDICAL DECISION MAKING FREE TEXT BOX
Attending note.  Patient with dyspnea on exertion as well as chest congestion and cough and without chest pain, fever or nausea and vomiting.  Patient currently is taking furosemide 10 mg daily as well as amlodipine 5 mg.  Differential includes congestive heart failure versus bronchitis versus respiratory virus versus ACS.  Chest x-ray, proBNP, troponin, blood pressure monitoring

## 2023-02-17 NOTE — ED PROVIDER NOTE - NS ED ATTENDING STATEMENT MOD
This was a shared visit with the DONTE. I reviewed and verified the documentation and independently performed the documented:

## 2023-02-17 NOTE — H&P ADULT - PROBLEM SELECTOR PLAN 1
constellation of symptoms appear to point towards viral infection  in mild respiratory distress with adequate spo2 on 2-3 LPM via NC  cxr shows bibasilar opacities atelectasis and/or pl eff, r/o infectious process  procal wnl  no clinft of acs, trop wnl x 2, ekg showing nsr with no st seg - t wave changes of acute ischemia; euvolemic, bnp wnl for age, prior tte 2018 with no significant findings/pathology  vbg shows mild pvco2 retention 49  prior ct chest 2018 showed, "superior right lower lobe and posterior right upper lobe may be mucoid impaction, infectious, or inflammatory...Left upper lobe 7 mm nodule. Right upper lobe 4 mm groundglass nodule."  follow up rvp, ct chest   Monitor SpO2, RR, for signs of respiratory distress; Goal SpO2 >88-92%, PaO2 >55-60 mmHg  bronchodilators + oxygen supplementation as needed to maintain goal  aggressive pulmonary toilet/hygiene with incentive spirometry/acapella device, chest pt, nebusal/mucomyst nebs  symptomatic relief with antitussives, mucolytics, antipyretics as needed

## 2023-02-17 NOTE — H&P ADULT - PROBLEM SELECTOR PLAN 4
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"; ct 2020 showed, "Extra-axial soft tissue mass indenting the superior sagittal sinus likely representing a meningioma" ie High attenuation lesion posteriorly in the high parietal region seen on the prior as well measuring 1.1 cm transverse x 8.6 mm AP x 1.1 cm craniocaudal dimension abutting the superior sagittal sinus consistent with a meningioma a/w Left maxillary sinus mucosal thickening), vascular dementia (Extensive microvascular ischemic changes involving the periventricular and subcortical white matter w Involutional changes)  Monitor mental status with frequent neurochecks  maintain fall, seizure, aspiration precautions; keep head end of bed elevated  delirium precautions (eg Minimize invasive lines + devices; avoid restraints; correct pre existing sensory deficits ie hearing aids, glasses; maintain adequate hydration; promote normal circadian rhythm ie adequate light and noise control; prevent environmental isolation ie music therapy, increase family interaction)  asa + statin  sertraline  pt/ot eval + sw/cm consult for disposition

## 2023-02-17 NOTE — ED ADULT NURSE NOTE - NSIMPLEMENTINTERV_GEN_ALL_ED
Implemented All Fall Risk Interventions:  [place] to call system. Call bell, personal items and telephone within reach. Instruct patient to call for assistance. Room bathroom lighting operational. Non-slip footwear when patient is off stretcher. Physically safe environment: no spills, clutter or unnecessary equipment. Stretcher in lowest position, wheels locked, appropriate side rails in place. Provide visual cue, wrist band, yellow gown, etc. Monitor gait and stability. Monitor for mental status changes and reorient to person, place, and time. Review medications for side effects contributing to fall risk. Reinforce activity limits and safety measures with patient and family.

## 2023-02-17 NOTE — ED PROVIDER NOTE - RAPID ASSESSMENT
Beata Melchor MD  89 yr old female with history of dementia here with increased confusion, cough, and heavy breathing, no fever, no chills, urinary frequency.  *** I, Beata Melchor MD, performed an initial face to face bedside interview with this patient regarding history of present illness and determined that the patient should be evaluated in the main ED. A physical exam was not performed due to private space availability. This patient's evaluation is NOT COMPLETE and only preliminary. The full assessment, management, and reassessment of this patient, including but not limited to the follow up of ordered laboratory and radiologic testing, is deferred to the main ED provider. ***

## 2023-02-17 NOTE — ED PROVIDER NOTE - OBJECTIVE STATEMENT
Beata Melchor MD  89 yr old female with history of dementia here with increased confusion, cough, and heavy breathing, no fever, no chills, urinary frequency.  *** I, Beata Melchor MD, performed an initial face to face bedside interview with this patient regarding history of present illness and determined that the patient should be evaluated in the main ED. A physical exam was not performed due to private space availability. This patient's evaluation is NOT COMPLETE and only preliminary. The full assessment, management, and reassessment of this patient, including but not limited to the follow up of ordered laboratory and radiologic testing, is deferred to the main ED provider. *** Attending note.  Patient was seen in room #18.  History is from the patient and her from patient's daughter.  Since this past weekend daughter noticed patient with frequently sighing and appears to have some difficulty breathing especially with exertion and when eating.  She also reports hearing congestion and a cough.  Patient denies any chest pain, fever.  Appetite has been good.  Patient has been more fatigued and tired the last few days.  Patient has a history of dementia

## 2023-02-17 NOTE — H&P ADULT - HISTORY OF PRESENT ILLNESS
89 yo f w pmh [place], macular degeneration, mci/dementia, ?meningiomas, htn, hld, cva, pud, colon ca s/p resection, bezoar, p/w generalized weakness/fatigue/lethargy, poor po intake, cough, congestion, sob. patient is a poor historian given underlying cognitive decline; history mainly obtained from chart. reportedly, patient has been experiencing aforementioned symptoms for ~1week. no associated fever, chill, rigors, chest pain, palpitations, loc, wheezing, abdominal discomfort, n+v, changes in bowel habits, dysuria. daughter grew concerned, so brought patient to [facility] er for further evaluation.

## 2023-02-18 ENCOUNTER — TRANSCRIPTION ENCOUNTER (OUTPATIENT)
Age: 88
End: 2023-02-18

## 2023-02-18 VITALS — DIASTOLIC BLOOD PRESSURE: 84 MMHG | HEART RATE: 78 BPM | SYSTOLIC BLOOD PRESSURE: 129 MMHG

## 2023-02-18 LAB
A1C WITH ESTIMATED AVERAGE GLUCOSE RESULT: 5.6 % — SIGNIFICANT CHANGE UP (ref 4–5.6)
ALBUMIN SERPL ELPH-MCNC: 3.7 G/DL — SIGNIFICANT CHANGE UP (ref 3.3–5)
ALP SERPL-CCNC: 219 U/L — HIGH (ref 40–120)
ALT FLD-CCNC: 17 U/L — SIGNIFICANT CHANGE UP (ref 10–45)
ANION GAP SERPL CALC-SCNC: 12 MMOL/L — SIGNIFICANT CHANGE UP (ref 5–17)
APTT BLD: 35.2 SEC — SIGNIFICANT CHANGE UP (ref 27.5–35.5)
AST SERPL-CCNC: 18 U/L — SIGNIFICANT CHANGE UP (ref 10–40)
BASOPHILS # BLD AUTO: 0.05 K/UL — SIGNIFICANT CHANGE UP (ref 0–0.2)
BASOPHILS NFR BLD AUTO: 0.7 % — SIGNIFICANT CHANGE UP (ref 0–2)
BILIRUB SERPL-MCNC: 0.4 MG/DL — SIGNIFICANT CHANGE UP (ref 0.2–1.2)
BUN SERPL-MCNC: 7 MG/DL — SIGNIFICANT CHANGE UP (ref 7–23)
CALCIUM SERPL-MCNC: 9.5 MG/DL — SIGNIFICANT CHANGE UP (ref 8.4–10.5)
CHLORIDE SERPL-SCNC: 102 MMOL/L — SIGNIFICANT CHANGE UP (ref 96–108)
CHOLEST SERPL-MCNC: 119 MG/DL — SIGNIFICANT CHANGE UP
CO2 SERPL-SCNC: 24 MMOL/L — SIGNIFICANT CHANGE UP (ref 22–31)
CREAT SERPL-MCNC: 0.4 MG/DL — LOW (ref 0.5–1.3)
EGFR: 91 ML/MIN/1.73M2 — SIGNIFICANT CHANGE UP
EOSINOPHIL # BLD AUTO: 0.3 K/UL — SIGNIFICANT CHANGE UP (ref 0–0.5)
EOSINOPHIL NFR BLD AUTO: 4.1 % — SIGNIFICANT CHANGE UP (ref 0–6)
ESTIMATED AVERAGE GLUCOSE: 114 MG/DL — SIGNIFICANT CHANGE UP (ref 68–114)
GLUCOSE SERPL-MCNC: 106 MG/DL — HIGH (ref 70–99)
HCT VFR BLD CALC: 41.8 % — SIGNIFICANT CHANGE UP (ref 34.5–45)
HDLC SERPL-MCNC: 60 MG/DL — SIGNIFICANT CHANGE UP
HGB BLD-MCNC: 13.7 G/DL — SIGNIFICANT CHANGE UP (ref 11.5–15.5)
IMM GRANULOCYTES NFR BLD AUTO: 0.3 % — SIGNIFICANT CHANGE UP (ref 0–0.9)
INR BLD: 1.23 RATIO — HIGH (ref 0.88–1.16)
LIPID PNL WITH DIRECT LDL SERPL: 39 MG/DL — SIGNIFICANT CHANGE UP
LYMPHOCYTES # BLD AUTO: 0.82 K/UL — LOW (ref 1–3.3)
LYMPHOCYTES # BLD AUTO: 11.3 % — LOW (ref 13–44)
MAGNESIUM SERPL-MCNC: 2 MG/DL — SIGNIFICANT CHANGE UP (ref 1.6–2.6)
MCHC RBC-ENTMCNC: 27 PG — SIGNIFICANT CHANGE UP (ref 27–34)
MCHC RBC-ENTMCNC: 32.8 GM/DL — SIGNIFICANT CHANGE UP (ref 32–36)
MCV RBC AUTO: 82.4 FL — SIGNIFICANT CHANGE UP (ref 80–100)
MONOCYTES # BLD AUTO: 0.53 K/UL — SIGNIFICANT CHANGE UP (ref 0–0.9)
MONOCYTES NFR BLD AUTO: 7.3 % — SIGNIFICANT CHANGE UP (ref 2–14)
NEUTROPHILS # BLD AUTO: 5.56 K/UL — SIGNIFICANT CHANGE UP (ref 1.8–7.4)
NEUTROPHILS NFR BLD AUTO: 76.3 % — SIGNIFICANT CHANGE UP (ref 43–77)
NON HDL CHOLESTEROL: 58 MG/DL — SIGNIFICANT CHANGE UP
NRBC # BLD: 0 /100 WBCS — SIGNIFICANT CHANGE UP (ref 0–0)
PHOSPHATE SERPL-MCNC: 2.8 MG/DL — SIGNIFICANT CHANGE UP (ref 2.5–4.5)
PLATELET # BLD AUTO: 274 K/UL — SIGNIFICANT CHANGE UP (ref 150–400)
POTASSIUM SERPL-MCNC: 3.5 MMOL/L — SIGNIFICANT CHANGE UP (ref 3.5–5.3)
POTASSIUM SERPL-SCNC: 3.5 MMOL/L — SIGNIFICANT CHANGE UP (ref 3.5–5.3)
PROT SERPL-MCNC: 6.2 G/DL — SIGNIFICANT CHANGE UP (ref 6–8.3)
PROTHROM AB SERPL-ACNC: 14.3 SEC — HIGH (ref 10.5–13.4)
RAPID RVP RESULT: DETECTED
RBC # BLD: 5.07 M/UL — SIGNIFICANT CHANGE UP (ref 3.8–5.2)
RBC # FLD: 14 % — SIGNIFICANT CHANGE UP (ref 10.3–14.5)
RV+EV RNA SPEC QL NAA+PROBE: DETECTED
SARS-COV-2 RNA SPEC QL NAA+PROBE: SIGNIFICANT CHANGE UP
SODIUM SERPL-SCNC: 138 MMOL/L — SIGNIFICANT CHANGE UP (ref 135–145)
TRIGL SERPL-MCNC: 94 MG/DL — SIGNIFICANT CHANGE UP
TSH SERPL-MCNC: 1.71 UIU/ML — SIGNIFICANT CHANGE UP (ref 0.27–4.2)
WBC # BLD: 7.28 K/UL — SIGNIFICANT CHANGE UP (ref 3.8–10.5)
WBC # FLD AUTO: 7.28 K/UL — SIGNIFICANT CHANGE UP (ref 3.8–10.5)

## 2023-02-18 PROCEDURE — 85730 THROMBOPLASTIN TIME PARTIAL: CPT

## 2023-02-18 PROCEDURE — 97165 OT EVAL LOW COMPLEX 30 MIN: CPT

## 2023-02-18 PROCEDURE — 84145 PROCALCITONIN (PCT): CPT

## 2023-02-18 PROCEDURE — 97161 PT EVAL LOW COMPLEX 20 MIN: CPT

## 2023-02-18 PROCEDURE — 85025 COMPLETE CBC W/AUTO DIFF WBC: CPT

## 2023-02-18 PROCEDURE — 80053 COMPREHEN METABOLIC PANEL: CPT

## 2023-02-18 PROCEDURE — 83605 ASSAY OF LACTIC ACID: CPT

## 2023-02-18 PROCEDURE — 85014 HEMATOCRIT: CPT

## 2023-02-18 PROCEDURE — 83735 ASSAY OF MAGNESIUM: CPT

## 2023-02-18 PROCEDURE — 71045 X-RAY EXAM CHEST 1 VIEW: CPT

## 2023-02-18 PROCEDURE — 71260 CT THORAX DX C+: CPT

## 2023-02-18 PROCEDURE — 87637 SARSCOV2&INF A&B&RSV AMP PRB: CPT

## 2023-02-18 PROCEDURE — 99285 EMERGENCY DEPT VISIT HI MDM: CPT | Mod: 25

## 2023-02-18 PROCEDURE — 84295 ASSAY OF SERUM SODIUM: CPT

## 2023-02-18 PROCEDURE — 80061 LIPID PANEL: CPT

## 2023-02-18 PROCEDURE — 84132 ASSAY OF SERUM POTASSIUM: CPT

## 2023-02-18 PROCEDURE — 82803 BLOOD GASES ANY COMBINATION: CPT

## 2023-02-18 PROCEDURE — 85018 HEMOGLOBIN: CPT

## 2023-02-18 PROCEDURE — 84484 ASSAY OF TROPONIN QUANT: CPT

## 2023-02-18 PROCEDURE — 83880 ASSAY OF NATRIURETIC PEPTIDE: CPT

## 2023-02-18 PROCEDURE — 94640 AIRWAY INHALATION TREATMENT: CPT

## 2023-02-18 PROCEDURE — 0225U NFCT DS DNA&RNA 21 SARSCOV2: CPT

## 2023-02-18 PROCEDURE — 36415 COLL VENOUS BLD VENIPUNCTURE: CPT

## 2023-02-18 PROCEDURE — 84443 ASSAY THYROID STIM HORMONE: CPT

## 2023-02-18 PROCEDURE — 85610 PROTHROMBIN TIME: CPT

## 2023-02-18 PROCEDURE — 81003 URINALYSIS AUTO W/O SCOPE: CPT

## 2023-02-18 PROCEDURE — 82435 ASSAY OF BLOOD CHLORIDE: CPT

## 2023-02-18 PROCEDURE — 82330 ASSAY OF CALCIUM: CPT

## 2023-02-18 PROCEDURE — 82947 ASSAY GLUCOSE BLOOD QUANT: CPT

## 2023-02-18 PROCEDURE — 83036 HEMOGLOBIN GLYCOSYLATED A1C: CPT

## 2023-02-18 PROCEDURE — 84100 ASSAY OF PHOSPHORUS: CPT

## 2023-02-18 RX ORDER — SERTRALINE 25 MG/1
2 TABLET, FILM COATED ORAL
Qty: 0 | Refills: 0 | DISCHARGE

## 2023-02-18 RX ORDER — HYDRALAZINE HCL 50 MG
5 TABLET ORAL ONCE
Refills: 0 | Status: COMPLETED | OUTPATIENT
Start: 2023-02-18 | End: 2023-02-18

## 2023-02-18 RX ORDER — SERTRALINE 25 MG/1
1 TABLET, FILM COATED ORAL
Qty: 30 | Refills: 0
Start: 2023-02-18 | End: 2023-03-19

## 2023-02-18 RX ORDER — LOSARTAN POTASSIUM 100 MG/1
100 TABLET, FILM COATED ORAL
Refills: 0 | Status: DISCONTINUED | OUTPATIENT
Start: 2023-02-18 | End: 2023-02-18

## 2023-02-18 RX ADMIN — Medication 81 MILLIGRAM(S): at 10:37

## 2023-02-18 RX ADMIN — Medication 3 MILLILITER(S): at 05:18

## 2023-02-18 RX ADMIN — Medication 4 MILLILITER(S): at 05:19

## 2023-02-18 RX ADMIN — Medication 20 MILLIGRAM(S): at 05:13

## 2023-02-18 RX ADMIN — SERTRALINE 100 MILLIGRAM(S): 25 TABLET, FILM COATED ORAL at 10:38

## 2023-02-18 RX ADMIN — LOSARTAN POTASSIUM 100 MILLIGRAM(S): 100 TABLET, FILM COATED ORAL at 00:50

## 2023-02-18 RX ADMIN — LOSARTAN POTASSIUM 100 MILLIGRAM(S): 100 TABLET, FILM COATED ORAL at 10:37

## 2023-02-18 RX ADMIN — Medication 5 MILLIGRAM(S): at 00:49

## 2023-02-18 RX ADMIN — SODIUM CHLORIDE 4 MILLILITER(S): 9 INJECTION INTRAMUSCULAR; INTRAVENOUS; SUBCUTANEOUS at 05:19

## 2023-02-18 RX ADMIN — AMLODIPINE BESYLATE 5 MILLIGRAM(S): 2.5 TABLET ORAL at 05:14

## 2023-02-18 RX ADMIN — Medication 50 MILLIGRAM(S): at 05:13

## 2023-02-18 RX ADMIN — PANTOPRAZOLE SODIUM 40 MILLIGRAM(S): 20 TABLET, DELAYED RELEASE ORAL at 06:24

## 2023-02-18 NOTE — CONSULT NOTE ADULT - ASSESSMENT
Full note pending  89y female with hx macular degeneration, dementia, ?meningiomas, htn, hld, cva, pud, colon ca s/p resection, p/w generalized weakness/fatigue/lethargy, poor po intake, cough, congestion, sob. Patient is a poor historian, history obtained from chart. No reported  fever or chills, rigors, she tested + rhino/enterovirus. She was also found to be hypertensive  No leukocytosis  Procalcitonin is low  Suggest:  F/u cultures  Supportive care for Enterovirus URI  No indications for abx at present  d/w Dr Gary

## 2023-02-18 NOTE — PATIENT PROFILE ADULT - LEGAL HELP
SUBJECTIVE:    Patient is a 60y old  Male who presents with a chief complaint of SOB (2018 10:39)    Currently admitted to medicine with the primary diagnosis of Dyspnea     Today is hospital day 1. Patient was seen and examined at bedside. This morning he is resting comfortably in bed and reports SOB, nausea, and abdominal pain.     PAST MEDICAL & SURGICAL HISTORY  PAST MEDICAL & SURGICAL HISTORY:  Pleural effusion  CHF (congestive heart failure)  Insomnia  BPH (benign prostatic hyperplasia)  Depression  Bipolar 1 disorder  HTN (hypertension)  Colostomy present  History of thoracentesis    SOCIAL HISTORY:  Denies active tobacco, alcohol, or illicit drug use  Extensive smoking history in past    ALLERGIES:  No Known Allergies    MEDICATIONS:  STANDING MEDICATIONS  chlorhexidine 4% Liquid 1 Application(s) Topical <User Schedule>  diltiazem    Tablet 60 milliGRAM(s) Oral every 8 hours  DULoxetine 60 milliGRAM(s) Oral daily  fentaNYL   Patch  50 MICROgram(s)/Hr 1 Patch Transdermal every 72 hours  furosemide   Injectable 40 milliGRAM(s) IV Push daily  influenza   Vaccine 0.5 milliLiter(s) IntraMuscular once  isosorbide    mononitrate Tablet (ISMO) 20 milliGRAM(s) Oral two times a day  latanoprost 0.005% Ophthalmic Solution 1 Drop(s) Both EYES at bedtime  multivitamin 1 Tablet(s) Oral daily  pantoprazole    Tablet 40 milliGRAM(s) Oral before breakfast  propranolol 20 milliGRAM(s) Oral two times a day  tamsulosin 0.4 milliGRAM(s) Oral at bedtime  traZODone 50 milliGRAM(s) Oral at bedtime    PRN MEDICATIONS  acetaminophen   Tablet .. 650 milliGRAM(s) Oral every 6 hours PRN  morphine  - Injectable 4 milliGRAM(s) IV Push every 2 hours PRN  ondansetron    Tablet 8 milliGRAM(s) Oral every 6 hours PRN  simethicone 80 milliGRAM(s) Chew every 6 hours PRN  zolpidem 5 milliGRAM(s) Oral at bedtime PRN    VITALS:   T(F): 98.3  HR: 102  BP: 116/69  RR: 18  SpO2: 94%    LABS:                        11.2   8.66  )-----------( 343      ( 2018 06:37 )             36.3     11-30    141  |  96<L>  |  9<L>  ----------------------------<  113<H>  4.1   |  31  |  0.8    Ca    9.0      2018 06:37  Phos  4.5       Mg     2.0         TPro  6.1  /  Alb  3.5  /  TBili  0.4  /  DBili  x   /  AST  15  /  ALT  15  /  AlkPhos  726<H>      PT/INR - ( 2018 06:37 )   PT: 17.10 sec;   INR: 1.49 ratio         PTT - ( 2018 06:37 )  PTT:39.3 sec  Urinalysis Basic - ( 2018 11:32 )    Color: Yellow / Appearance: Cloudy / S.010 / pH: x  Gluc: x / Ketone: Negative  / Bili: Negative / Urobili: 0.2 mg/dL   Blood: x / Protein: Negative mg/dL / Nitrite: Positive   Leuk Esterase: Large / RBC: x / WBC 26-50 /HPF   Sq Epi: x / Non Sq Epi: Occasional /HPF / Bacteria: Few /HPF        Creatine Kinase, Serum: 67 U/L (18 @ 06:37)  Troponin T, Serum: <0.01 ng/mL (18 @ 06:37)      CARDIAC MARKERS ( 2018 06:37 )  x     / <0.01 ng/mL / 67 U/L / x     / 2.5 ng/mL  CARDIAC MARKERS ( 2018 11:32 )  x     / <0.01 ng/mL / x     / x     / x          RADIOLOGY:  < from: CT Angio Chest w/ IV Cont (18 @ 16:35) >  IMPRESSION:    No acute central pulmonary embolism.    New bilateral pulmonary nodules measuring up to 12 mm.    Increasing size of right pleural effusion, moderate to large, with   pleural enhancement at the right lung base. Empyema is not excluded.    Increasing right base opacity. Multifocal groundglass opacities.   Increasing peribronchial wall thickening.    Increase in size of multiloculated pericardial effusion versus loculated   fluid collections within the mediastinum extending into the superior   mediastinum.    Right-sided diverting colostomy, new. Mild underlying mesenteric fat   stranding is likely represent postsurgical change.     Bones are minimally heterogeneous in density. Sclerotic lesions are not   excluded. Further evaluation with nuclear medicine bone scan is   recommended.    Findings are concerning with progression of neoplastic disease    < end of copied text >      < from: Xray Chest 1 View-PORTABLE IMMEDIATE (11.29.18 @ 12:38) >  Impression:      Unchanged bilateral opacities and pleural effusions greater on the right.    < end of copied text >      PHYSICAL EXAM:  GENERAL: NAD, speaks in full sentences, appears weak and deconditioned  HEAD: Atraumatic, Normocephalic; left eye deviated medially  NECK: Supple, No JVD  CHEST/LUNG: Air entry b/l, decreased breath sounds on right bases. No wheeze or crackles  HEART: S1, S2; RRR; No murmurs, rubs, or gallops  ABDOMEN: Colostomy in place; BS+; Soft, tender to palpation in epigastric region. Non-distended  EXTREMITIES:  2+ Peripheral Pulses, No clubbing, cyanosis. 1+ pitting edema in lower extremities b/l  PSYCH: AAOx3  NEUROLOGY: non-focal  SKIN: No rashes or lesions no

## 2023-02-18 NOTE — OCCUPATIONAL THERAPY INITIAL EVALUATION ADULT - DIAGNOSIS, OT EVAL
Patient demonstrates decreased strength, balance and cognition limiting ADLs and functional transfers.

## 2023-02-18 NOTE — CONSULT NOTE ADULT - SUBJECTIVE AND OBJECTIVE BOX
HPI:   Patient is a 89y female with    REVIEW OF SYSTEMS:  All other review of systems negative (Comprehensive ROS)    PAST MEDICAL & SURGICAL HISTORY:  Gastric ulcer      Colon cancer      HTN (hypertension)      Macular degeneration, unspecified laterality, unspecified type      Bezoar, sequela      History of colon resection      H/O abdominal hysterectomy          Allergies    No Known Allergies    Intolerances        Antimicrobials Day #      Other Medications:  acetaminophen     Tablet .. 650 milliGRAM(s) Oral every 6 hours PRN  acetylcysteine 10%  Inhalation 4 milliLiter(s) Inhalation every 12 hours  albuterol/ipratropium for Nebulization 3 milliLiter(s) Nebulizer every 6 hours  aluminum hydroxide/magnesium hydroxide/simethicone Suspension 30 milliLiter(s) Oral every 4 hours PRN  amLODIPine   Tablet 5 milliGRAM(s) Oral daily  aspirin enteric coated 81 milliGRAM(s) Oral daily  atorvastatin 10 milliGRAM(s) Oral at bedtime  benzonatate 100 milliGRAM(s) Oral every 8 hours PRN  enoxaparin Injectable 40 milliGRAM(s) SubCutaneous every 24 hours  furosemide    Tablet 20 milliGRAM(s) Oral daily  guaifenesin/dextromethorphan Oral Liquid 10 milliLiter(s) Oral every 4 hours PRN  losartan 100 milliGRAM(s) Oral <User Schedule>  melatonin 3 milliGRAM(s) Oral at bedtime PRN  metoprolol succinate ER 50 milliGRAM(s) Oral daily  ondansetron Injectable 4 milliGRAM(s) IV Push every 8 hours PRN  pantoprazole    Tablet 40 milliGRAM(s) Oral before breakfast  sertraline 100 milliGRAM(s) Oral daily  sodium chloride 3%  Inhalation 4 milliLiter(s) Inhalation every 12 hours      FAMILY HISTORY:      SOCIAL HISTORY:  Smoking:     ETOH:     Drug Use:     Single     T(F): 97.7 (23 @ 04:19), Max: 98.6 (23 @ 19:15)  HR: 75 (23 @ 04:19)  BP: 189/96 (23 @ 04:19)  RR: 18 (23 @ 04:19)  SpO2: 95% (23 @ 04:19)  Wt(kg): --    PHYSICAL EXAM:  General: alert, no acute distress  Eyes:  anicteric, no conjunctival injection, no discharge  Oropharynx: no lesions or injection 	  Neck: supple, without adenopathy  Lungs: clear to auscultation  Heart: regular rate and rhythm; no murmur, rubs or gallops  Abdomen: soft, nondistended, nontender, without mass or organomegaly  Skin: no lesions  Extremities: no clubbing, cyanosis, or edema  Neurologic: alert, oriented, moves all extremities    LAB RESULTS:                        13.7   7.28  )-----------( 274      ( 2023 05:23 )             41.8         138  |  102  |  7   ----------------------------<  106<H>  3.5   |  24  |  0.40<L>    Ca    9.5      2023 05:23  Phos  2.8       Mg     2.0         TPro  6.2  /  Alb  3.7  /  TBili  0.4  /  DBili  x   /  AST  18  /  ALT  17  /  AlkPhos  219<H>  18    LIVER FUNCTIONS - ( 2023 05:23 )  Alb: 3.7 g/dL / Pro: 6.2 g/dL / ALK PHOS: 219 U/L / ALT: 17 U/L / AST: 18 U/L / GGT: x           Urinalysis Basic - ( 2023 20:37 )    Color: Light Yellow / Appearance: Clear / S.006 / pH: x  Gluc: x / Ketone: Negative  / Bili: Negative / Urobili: Negative   Blood: x / Protein: Negative / Nitrite: Negative   Leuk Esterase: Negative / RBC: x / WBC x   Sq Epi: x / Non Sq Epi: x / Bacteria: x        MICROBIOLOGY REVIEWED:    RADIOLOGY REVIEWED:   HPI:   Patient is a 89y female with hx macular degeneration, dementia, ?meningiomas, htn, hld, cva, pud, colon ca s/p resection, p/w generalized weakness/fatigue/lethargy, poor po intake, cough, congestion, sob. Patient is a poor historian, history obtained from chart. No reported  fever or chills, rigors, she tested + rhino/enterovirus. She was also found to be hypertensive    REVIEW OF SYSTEMS:  All other review of systems negative (Comprehensive ROS)    PAST MEDICAL & SURGICAL HISTORY:  Gastric ulcer      Colon cancer      HTN (hypertension)      Macular degeneration, unspecified laterality, unspecified type      Bezoar, sequela      History of colon resection      H/O abdominal hysterectomy          Allergies    No Known Allergies    Intolerances        Antimicrobials Day #      Other Medications:  acetaminophen     Tablet .. 650 milliGRAM(s) Oral every 6 hours PRN  acetylcysteine 10%  Inhalation 4 milliLiter(s) Inhalation every 12 hours  albuterol/ipratropium for Nebulization 3 milliLiter(s) Nebulizer every 6 hours  aluminum hydroxide/magnesium hydroxide/simethicone Suspension 30 milliLiter(s) Oral every 4 hours PRN  amLODIPine   Tablet 5 milliGRAM(s) Oral daily  aspirin enteric coated 81 milliGRAM(s) Oral daily  atorvastatin 10 milliGRAM(s) Oral at bedtime  benzonatate 100 milliGRAM(s) Oral every 8 hours PRN  enoxaparin Injectable 40 milliGRAM(s) SubCutaneous every 24 hours  furosemide    Tablet 20 milliGRAM(s) Oral daily  guaifenesin/dextromethorphan Oral Liquid 10 milliLiter(s) Oral every 4 hours PRN  losartan 100 milliGRAM(s) Oral <User Schedule>  melatonin 3 milliGRAM(s) Oral at bedtime PRN  metoprolol succinate ER 50 milliGRAM(s) Oral daily  ondansetron Injectable 4 milliGRAM(s) IV Push every 8 hours PRN  pantoprazole    Tablet 40 milliGRAM(s) Oral before breakfast  sertraline 100 milliGRAM(s) Oral daily  sodium chloride 3%  Inhalation 4 milliLiter(s) Inhalation every 12 hours      FAMILY HISTORY:      SOCIAL HISTORY:  Smoking:     ETOH:     Drug Use:     Single     T(F): 97.7 (23 @ 04:19), Max: 98.6 (23 @ 19:15)  HR: 75 (23 @ 04:19)  BP: 189/96 (23 @ 04:19)  RR: 18 (23 @ 04:19)  SpO2: 95% (23 @ 04:19)  Wt(kg): --    PHYSICAL EXAM:  General: alert, no acute distress  Eyes:  anicteric, no conjunctival injection, no discharge  Oropharynx: no lesions or injection 	  Neck: supple, without adenopathy  Lungs: clear to auscultation  Heart: regular rate and rhythm; no murmur, rubs or gallops  Abdomen: soft, nondistended, nontender, without mass or organomegaly  Skin: no lesions  Extremities: no clubbing, cyanosis, or edema  Neurologic: alert, oriented, moves all extremities    LAB RESULTS:                        13.7   7.28  )-----------( 274      ( 2023 05:23 )             41.8     -    138  |  102  |  7   ----------------------------<  106<H>  3.5   |  24  |  0.40<L>    Ca    9.5      2023 05:23  Phos  2.8       Mg     2.0     -    TPro  6.2  /  Alb  3.7  /  TBili  0.4  /  DBili  x   /  AST  18  /  ALT  17  /  AlkPhos  219<H>  02-18    LIVER FUNCTIONS - ( 2023 05:23 )  Alb: 3.7 g/dL / Pro: 6.2 g/dL / ALK PHOS: 219 U/L / ALT: 17 U/L / AST: 18 U/L / GGT: x           Urinalysis Basic - ( 2023 20:37 )    Color: Light Yellow / Appearance: Clear / S.006 / pH: x  Gluc: x / Ketone: Negative  / Bili: Negative / Urobili: Negative   Blood: x / Protein: Negative / Nitrite: Negative   Leuk Esterase: Negative / RBC: x / WBC x   Sq Epi: x / Non Sq Epi: x / Bacteria: x        MICROBIOLOGY REVIEWED:    RADIOLOGY REVIEWED:  < from: CT Chest w/ IV Cont (23 @ 23:38) >    IMPRESSION:    A 1.3 cm right upper lobe pulmonary nodule has increased in size when   compared to 2018. Exact etiology is unclear.    Small bilateral pleural effusions right greater than left.    Trace pericardial effusion.    < end of copied text >

## 2023-02-18 NOTE — DISCHARGE NOTE PROVIDER - NSDCFUADDAPPT_GEN_ALL_CORE_FT
APPTS ARE READY TO BE MADE: [ x] YES    Best Family or Patient Contact (if needed):    Additional Information about above appointments (if needed):    1: Medicine  2:   3:     Other comments or requests:     APPTS ARE READY TO BE MADE: [ x] YES    Best Family or Patient Contact (if needed):    Additional Information about above appointments (if needed):    1: Medicine  2:   3:     Other comments or requests: Patient was provided with follow up request details and was advised to call to schedule follow up within specified time frame.

## 2023-02-18 NOTE — PATIENT PROFILE ADULT - FALL HARM RISK - HARM RISK INTERVENTIONS

## 2023-02-18 NOTE — PROGRESS NOTE ADULT - ASSESSMENT
89 yo F w PMH [place], macular degeneration, mci/dementia, ?meningiomas, htn, hld, cva, pud, colon ca s/p resection, bezoar, p/w generalized weakness/fatigue/lethargy, cough, congestion, sob, found to be in ahrf (req 2-3 LPM via NC) with uncontrolled htn, admitted to medicine for further mgmt.      Problem/Plan - 1:  ·  Problem: Acute respiratory failure with hypoxia and hypercapnia.   ·  Plan: constellation of symptoms appear to point towards viral infection  RVP confirmed Entero/Rhino Virus.   In the ED, in mild respiratory distress with adequate spo2 on 2-3 LPM via NC  cxr shows bibasilar opacities atelectasis and/or pl eff, r/o infectious process  procal wnl  no clinical signs of acs, trop wnl x 2 neg, ekg showing nsr with no st seg - t wave changes of acute ischemia; euvolemic, bnp wnl for age, prior tte 2018 with no significant findings/pathology  vbg shows mild pvco2 retention 49  prior ct chest 2018 showed, "superior right lower lobe and posterior right upper lobe may be mucoid impaction, infectious, or inflammatory...Left upper lobe 7 mm nodule. Right upper lobe 4 mm groundglass nodule."  follow up rvp, ct chest noted 1.3 cm RUL nodule. discussed with the daughter. conservative management. copy of scans provided.   aggressive pulmonary toilet/hygiene with incentive spirometry/acapella device, chest pt, nebusal/mucomyst nebs  symptomatic relief with antitussives, mucolytics, antipyretics as needed.  She received extra Lasix 20 mg yesterday (20 mg at home and 20 mg in the ED).   Breathing now back to baseline. on room air.   Reluctant to increase Lasix to higher dose given her age and inconsistent PO intake.    Her BP is also much improved.   Plan to dc home on home Regimen of Toprolol 50 mg, Lasix 20 mg, Losartan 100 mg, Amlodipine 5 mg.  (advised the daughter to check BP daily).  May have room to increase Amlodipine or Lasix in the future with close electrolyte monitoring      Problem/Plan - 2:  ·  Problem: HTN (hypertension).   ·  Plan: furosemide 20  meto succ 50  amlo 5  losartan 100.     Problem/Plan - 3:  ·  Problem: HLD (hyperlipidemia).   ·  Plan: c/w atorvastatin      Problem/Plan - 4:  ·  Problem: Dementia.   ·  Plan: h/o [place], macular degeneration, cva (mri 2018 showed, "Punctate acute infarct in the left parietotemporal region"), meningiomas (mri 2018 showed, "Multiple small meningiomas....Osseous metastasis involving the high left parietal bone"; ct 2020 showed, "Extra-axial soft tissue mass indenting the superior sagittal sinus likely representing a meningioma" ie High attenuation lesion posteriorly in the high parietal region seen on the prior as well measuring 1.1 cm transverse x 8.6 mm AP x 1.1 cm craniocaudal dimension abutting the superior sagittal sinus consistent with a meningioma a/w Left maxillary sinus mucosal thickening), vascular dementia (Extensive microvascular ischemic changes involving the periventricular and subcortical white matter w Involutional changes)  Monitor mental status with frequent neurochecks  maintain fall, seizure, aspiration precautions; keep head end of bed elevated  delirium precautions (eg Minimize invasive lines + devices; avoid restraints; correct pre existing sensory deficits ie hearing aids, glasses; maintain adequate hydration; promote normal circadian rhythm ie adequate light and noise control; prevent environmental isolation ie music therapy, increase family interaction)  c/w asa + statin  (she is already on preventative therapy and repeat CT head/MRI will not change her clinical management). no focal weakness to suggest acute stroke.   c/w sertraline  pt/ot eval + sw/cm consult for disposition.     Problem/Plan - 5:  ·  Problem: PUD (peptic ulcer disease).   ·  Plan: omeprazole      D/w PT/OT and CM at bedside. d/w NP Corinne.   Message left with PCP Dr. Guy.  d/w the daughter Tana and the nephew Dr. Gomez (interventional cardiologist).

## 2023-02-18 NOTE — DISCHARGE NOTE NURSING/CASE MANAGEMENT/SOCIAL WORK - PATIENT PORTAL LINK FT
You can access the FollowMyHealth Patient Portal offered by [facility] by registering at the following website: [email or web address]. By joining [facility]’s FollowMyHealth portal, you will also be able to view your health information using other applications (apps) compatible with our system.

## 2023-02-18 NOTE — DISCHARGE NOTE PROVIDER - NSDCCPCAREPLAN_GEN_ALL_CORE_FT
PRINCIPAL DISCHARGE DIAGNOSIS  Diagnosis: CHF (congestive heart failure)  Assessment and Plan of Treatment: Weigh yourself daily.  If you gain 3lbs in 3 days, or 5lbs in a week call your Health Care Provider.  Do not eat or drink foods containing more than 2000mg of salt (sodium) in your diet every day.  Call your Health Care Provider if you have any swelling or increased swelling in your feet, ankles, and/or stomach.  Take all of your medication as directed.  If you become dizzy call your Health Care Provider.      SECONDARY DISCHARGE DIAGNOSES  Diagnosis: HTN (hypertension)  Assessment and Plan of Treatment: Low salt diet  Activity as tolerated.  Take all medication as prescribed.  Follow up with your medical doctor for routine blood pressure monitoring at your next visit.  Notify your doctor if you have any of the following symptoms:   Dizziness, Lightheadedness, Blurry vision, Headache, Chest pain, Shortness of breath

## 2023-02-18 NOTE — OCCUPATIONAL THERAPY INITIAL EVALUATION ADULT - ADDITIONAL COMMENTS
Pt lives in apartment w/ 0 ZANA/ elevator access and HHA 24-7, stays with daughters Fri-Mon. Pt has a rollator, walk in shower w/ shower chair.

## 2023-02-18 NOTE — DISCHARGE NOTE PROVIDER - HOSPITAL COURSE
89 yo f w pmh [place], macular degeneration, mci/dementia, ?meningiomas, htn, hld, cva, pud, colon ca s/p resection, bezoar, p/w generalized weakness/fatigue/lethargy, cough, congestion, sob, found to be in ahrf (req 2-3 LPM via NC) with uncontrolled htn, admitted to medicine for further mgmt     Problem/Plan - 1:  ·  Problem: Acute respiratory failure with hypoxia and hypercapnia.   ·  Plan: constellation of symptoms appear to point towards viral infection  in mild respiratory distress with adequate spo2 on 2-3 LPM via NC  cxr shows bibasilar opacities atelectasis and/or pl eff, r/o infectious process  procal wnl  no clinft of acs, trop wnl x 2, ekg showing nsr with no st seg - t wave changes of acute ischemia; euvolemic, bnp wnl for age, prior tte 2018 with no significant findings/pathology  vbg shows mild pvco2 retention 49  prior ct chest 2018 showed, "superior right lower lobe and posterior right upper lobe may be mucoid impaction, infectious, or inflammatory...Left upper lobe 7 mm nodule. Right upper lobe 4 mm groundglass nodule."  follow up rvp, ct chest   Monitor SpO2, RR, for signs of respiratory distress; Goal SpO2 >88-92%, PaO2 >55-60 mmHg  bronchodilators + oxygen supplementation as needed to maintain goal  aggressive pulmonary toilet/hygiene with incentive spirometry/acapella device, chest pt, nebusal/mucomyst nebs  symptomatic relief with antitussives, mucolytics, antipyretics as needed.     Problem/Plan - 2:  ·  Problem: HTN (hypertension).   ·  Plan: furosemide 20  meto succ 50  amlo 5  losartan 100.     Problem/Plan - 3:  ·  Problem: HLD (hyperlipidemia).   ·  Plan: atorva.     Problem/Plan - 4:  ·  Problem: Dementia.   ·  Plan: h/o [place], macular degeneration, cva (mri 2018 showed, "Punctate acute infarct in the left parietotemporal region"), meningiomas (mri 2018 showed, "Multiple small meningiomas....Osseous metastasis involving the high left parietal bone"; ct 2020 showed, "Extra-axial soft tissue mass indenting the superior sagittal sinus likely representing a meningioma" ie High attenuation lesion posteriorly in the high parietal region seen on the prior as well measuring 1.1 cm transverse x 8.6 mm AP x 1.1 cm craniocaudal dimension abutting the superior sagittal sinus consistent with a meningioma a/w Left maxillary sinus mucosal thickening), vascular dementia (Extensive microvascular ischemic changes involving the periventricular and subcortical white matter w Involutional changes)  Monitor mental status with frequent neurochecks  maintain fall, seizure, aspiration precautions; keep head end of bed elevated  delirium precautions (eg Minimize invasive lines + devices; avoid restraints; correct pre existing sensory deficits ie hearing aids, glasses; maintain adequate hydration; promote normal circadian rhythm ie adequate light and noise control; prevent environmental isolation ie music therapy, increase family interaction)  asa + statin  sertraline  pt/ot eval + sw/cm consult for disposition.     Problem/Plan - 5:  ·  Problem: PUD (peptic ulcer disease).   ·  Plan: omeprazole -> pantoprazole.       Additional Information:  Additional Information: full code  activity as tolerated   vte ppx w lovenox  regular diet

## 2023-02-18 NOTE — OCCUPATIONAL THERAPY INITIAL EVALUATION ADULT - PERTINENT HX OF CURRENT PROBLEM, REHAB EVAL
89 yo f w pmh [place], macular degeneration, mci/dementia, ?meningiomas, htn, hld, cva, pud, colon ca s/p resection, [place], p/w generalized weakness/fatigue/lethargy, poor po intake, cough, congestion, sob. patient is a poor historian given underlying cognitive decline; history mainly obtained from chart. reportedly, patient has been experiencing aforementioned symptoms for ~1week. no associated fever, chill, rigors, chest pain, palpitations, loc, wheezing, abdominal discomfort, n+v, changes in bowel habits, dysuria. daughter grew concerned, so brought patient to [facility] er for further evaluation.

## 2023-02-18 NOTE — PHYSICAL THERAPY INITIAL EVALUATION ADULT - ADDITIONAL COMMENTS
Pt lives in an apartment with no stairs to enter and elevator access to home. Home health aide 24/7. Requires assistance with all ADLs and IADLs prior to admission.

## 2023-02-18 NOTE — DISCHARGE NOTE PROVIDER - NSDCMRMEDTOKEN_GEN_ALL_CORE_FT
amLODIPine 5 mg oral tablet: 1 tab(s) orally once a day  aspirin 81 mg oral delayed release tablet: 1 tab(s) orally once a day  atorvastatin 10 mg oral tablet: 1 tab(s) orally once a day (at bedtime)  furosemide 20 mg oral tablet: 1 tab(s) orally once a day  losartan 100 mg oral tablet: 1 tab(s) orally once a day  metoprolol succinate 50 mg oral tablet, extended release: 1 tab(s) orally once a day  motilium: 1 tab(s) orally 2 times a day  omeprazole 20 mg oral delayed release capsule: 1 cap(s) orally once a day  sertraline 100 mg oral tablet: 1 tab(s) orally once a day

## 2023-02-18 NOTE — PROGRESS NOTE ADULT - SUBJECTIVE AND OBJECTIVE BOX
SUBJECTIVE / OVERNIGHT EVENTS:  --- COVERAGE for Dr. Jarvis ---  Pt seen and examined at bedside.   the daughter Tana caregiver at bedside.  No overnight event.   Feeling better. BP is improving  no cp, no sob, no n/v/d. no abdominal pain.  no headache, no dizziness.   no dysuria, no urinary urgency/frequency. no bowel/bladder incontinence.   tele: no events other than occasional PVC  period of sundowning, easily reoriented.       --------------------------------------------------------------------------------------------  LABS:                        13.7   7.28  )-----------( 274      ( 2023 05:23 )             41.8         138  |  102  |  7   ----------------------------<  106<H>  3.5   |  24  |  0.40<L>    Ca    9.5      2023 05:23  Phos  2.8       Mg     2.0         TPro  6.2  /  Alb  3.7  /  TBili  0.4  /  DBili  x   /  AST  18  /  ALT  17  /  AlkPhos  219<H>  18    PT/INR - ( 2023 05:23 )   PT: 14.3 sec;   INR: 1.23 ratio         PTT - ( 2023 05:23 )  PTT:35.2 sec  CAPILLARY BLOOD GLUCOSE            Urinalysis Basic - ( 2023 20:37 )    Color: Light Yellow / Appearance: Clear / S.006 / pH: x  Gluc: x / Ketone: Negative  / Bili: Negative / Urobili: Negative   Blood: x / Protein: Negative / Nitrite: Negative   Leuk Esterase: Negative / RBC: x / WBC x   Sq Epi: x / Non Sq Epi: x / Bacteria: x        RADIOLOGY & ADDITIONAL TESTS:    Imaging Personally Reviewed:  [x] YES  [ ] NO    Consultant(s) Notes Reviewed:  [x] YES  [ ] NO    MEDICATIONS  (STANDING):  acetylcysteine 10%  Inhalation 4 milliLiter(s) Inhalation every 12 hours  albuterol/ipratropium for Nebulization 3 milliLiter(s) Nebulizer every 6 hours  amLODIPine   Tablet 5 milliGRAM(s) Oral daily  aspirin enteric coated 81 milliGRAM(s) Oral daily  atorvastatin 10 milliGRAM(s) Oral at bedtime  enoxaparin Injectable 40 milliGRAM(s) SubCutaneous every 24 hours  furosemide    Tablet 20 milliGRAM(s) Oral daily  losartan 100 milliGRAM(s) Oral <User Schedule>  metoprolol succinate ER 50 milliGRAM(s) Oral daily  pantoprazole    Tablet 40 milliGRAM(s) Oral before breakfast  sertraline 100 milliGRAM(s) Oral daily  sodium chloride 3%  Inhalation 4 milliLiter(s) Inhalation every 12 hours    MEDICATIONS  (PRN):  acetaminophen     Tablet .. 650 milliGRAM(s) Oral every 6 hours PRN Temp greater or equal to 38C (100.4F), Mild Pain (1 - 3)  aluminum hydroxide/magnesium hydroxide/simethicone Suspension 30 milliLiter(s) Oral every 4 hours PRN Dyspepsia  benzonatate 100 milliGRAM(s) Oral every 8 hours PRN Cough  guaifenesin/dextromethorphan Oral Liquid 10 milliLiter(s) Oral every 4 hours PRN Cough  melatonin 3 milliGRAM(s) Oral at bedtime PRN Insomnia  ondansetron Injectable 4 milliGRAM(s) IV Push every 8 hours PRN Nausea and/or Vomiting      Care Discussed with Consultants/Other Providers [x] YES  [ ] NO    Vital Signs Last 24 Hrs  T(C): 36.7 (2023 12:04), Max: 37 (2023 19:15)  T(F): 98 (2023 12:04), Max: 98.6 (2023 19:15)  HR: 78 (2023 14:55) (69 - 87)  BP: 129/84 (2023 14:55) (129/84 - 216/90)  BP(mean): --  RR: 18 (2023 10:00) (16 - 18)  SpO2: 96% (2023 10:00) (95% - 96%)    Parameters below as of 2023 10:00  Patient On (Oxygen Delivery Method): room air      I&O's Summary    2023 07:01  -  2023 15:20  --------------------------------------------------------  IN: 125 mL / OUT: 0 mL / NET: 125 mL      PHYSICAL EXAM:  GENERAL: NAD, well-developed, comfortable on room air  HEAD:  Atraumatic, Normocephalic  EYES: EOMI, PERRLA, conjunctiva and sclera clear  NECK: Supple, No JVD  CHEST/LUNG: mild decrease breath sounds bilaterally; No wheeze   HEART: Regular rate and rhythm; No murmurs, rubs, or gallops  ABDOMEN: Soft, Nontender, Nondistended; Bowel sounds present  Neuro: AAOx2, no focal weakness, 5/5 b/l extremity strength  EXTREMITIES:  2+ Peripheral Pulses, No clubbing, cyanosis, or edema  SKIN: No rashes or lesions

## 2023-02-18 NOTE — PATIENT PROFILE ADULT - FUNCTIONAL ASSESSMENT - BASIC MOBILITY 6.
3-calculated by average/Not able to assess (calculate score using [facility] averaging method)

## 2023-02-18 NOTE — DISCHARGE NOTE PROVIDER - CARE PROVIDER_API CALL
[facility]  INTERNAL MEDICINE  [address] [place] [address]  Phone: [phone]  Fax: [phone]  Follow Up Time: 2 weeks

## 2023-02-22 ENCOUNTER — TRANSCRIPTION ENCOUNTER (OUTPATIENT)
Age: 88
End: 2023-02-22

## 2023-02-23 ENCOUNTER — TRANSCRIPTION ENCOUNTER (OUTPATIENT)
Age: 88
End: 2023-02-23

## 2023-03-17 ENCOUNTER — INPATIENT (INPATIENT)
Facility: HOSPITAL | Age: 88
LOS: 5 days | Discharge: HOME | DRG: 871 | End: 2023-03-23
Attending: INTERNAL MEDICINE | Admitting: STUDENT IN AN ORGANIZED HEALTH CARE EDUCATION/TRAINING PROGRAM
Payer: MEDICARE

## 2023-03-17 VITALS
WEIGHT: 125 LBS | DIASTOLIC BLOOD PRESSURE: 70 MMHG | RESPIRATION RATE: 20 BRPM | HEART RATE: 84 BPM | SYSTOLIC BLOOD PRESSURE: 142 MMHG | HEIGHT: 60 IN | OXYGEN SATURATION: 99 %

## 2023-03-17 DIAGNOSIS — Z90.49 ACQUIRED ABSENCE OF OTHER SPECIFIED PARTS OF DIGESTIVE TRACT: Chronic | ICD-10-CM

## 2023-03-17 DIAGNOSIS — R09.02 HYPOXEMIA: ICD-10-CM

## 2023-03-17 DIAGNOSIS — Z90.710 ACQUIRED ABSENCE OF BOTH CERVIX AND UTERUS: Chronic | ICD-10-CM

## 2023-03-17 LAB
ALBUMIN SERPL ELPH-MCNC: 4 G/DL — SIGNIFICANT CHANGE UP (ref 3.3–5)
ALP SERPL-CCNC: 206 U/L — HIGH (ref 40–120)
ALT FLD-CCNC: 14 U/L — SIGNIFICANT CHANGE UP (ref 10–45)
ANION GAP SERPL CALC-SCNC: 12 MMOL/L — SIGNIFICANT CHANGE UP (ref 5–17)
ANISOCYTOSIS BLD QL: SLIGHT — SIGNIFICANT CHANGE UP
APTT BLD: 34.4 SEC — SIGNIFICANT CHANGE UP (ref 27.5–35.5)
AST SERPL-CCNC: 14 U/L — SIGNIFICANT CHANGE UP (ref 10–40)
BASE EXCESS BLDV CALC-SCNC: -0.4 MMOL/L — SIGNIFICANT CHANGE UP (ref -2–3)
BASOPHILS # BLD AUTO: 0.18 K/UL — SIGNIFICANT CHANGE UP (ref 0–0.2)
BASOPHILS NFR BLD AUTO: 0.8 % — SIGNIFICANT CHANGE UP (ref 0–2)
BILIRUB SERPL-MCNC: 0.7 MG/DL — SIGNIFICANT CHANGE UP (ref 0.2–1.2)
BUN SERPL-MCNC: 17 MG/DL — SIGNIFICANT CHANGE UP (ref 7–23)
BURR CELLS BLD QL SMEAR: PRESENT — SIGNIFICANT CHANGE UP
CA-I SERPL-SCNC: 1.24 MMOL/L — SIGNIFICANT CHANGE UP (ref 1.15–1.33)
CALCIUM SERPL-MCNC: 10 MG/DL — SIGNIFICANT CHANGE UP (ref 8.4–10.5)
CHLORIDE BLDV-SCNC: 101 MMOL/L — SIGNIFICANT CHANGE UP (ref 96–108)
CHLORIDE SERPL-SCNC: 99 MMOL/L — SIGNIFICANT CHANGE UP (ref 96–108)
CO2 BLDV-SCNC: 25 MMOL/L — SIGNIFICANT CHANGE UP (ref 22–26)
CO2 SERPL-SCNC: 25 MMOL/L — SIGNIFICANT CHANGE UP (ref 22–31)
CREAT SERPL-MCNC: 0.47 MG/DL — LOW (ref 0.5–1.3)
EGFR: 87 ML/MIN/1.73M2 — SIGNIFICANT CHANGE UP
EOSINOPHIL # BLD AUTO: 0 K/UL — SIGNIFICANT CHANGE UP (ref 0–0.5)
EOSINOPHIL NFR BLD AUTO: 0 % — SIGNIFICANT CHANGE UP (ref 0–6)
GAS PNL BLDV: 131 MMOL/L — LOW (ref 136–145)
GAS PNL BLDV: SIGNIFICANT CHANGE UP
GAS PNL BLDV: SIGNIFICANT CHANGE UP
GLUCOSE BLDV-MCNC: 145 MG/DL — HIGH (ref 70–99)
GLUCOSE SERPL-MCNC: 108 MG/DL — HIGH (ref 70–99)
HCO3 BLDV-SCNC: 24 MMOL/L — SIGNIFICANT CHANGE UP (ref 22–29)
HCT VFR BLD CALC: 41.6 % — SIGNIFICANT CHANGE UP (ref 34.5–45)
HCT VFR BLDA CALC: 35 % — SIGNIFICANT CHANGE UP (ref 34.5–46.5)
HGB BLD CALC-MCNC: 11.6 G/DL — LOW (ref 11.7–16.1)
HGB BLD-MCNC: 13.7 G/DL — SIGNIFICANT CHANGE UP (ref 11.5–15.5)
HOROWITZ INDEX BLDV+IHG-RTO: SIGNIFICANT CHANGE UP
INR BLD: 1.55 RATIO — HIGH (ref 0.88–1.16)
LACTATE BLDV-MCNC: 1.1 MMOL/L — SIGNIFICANT CHANGE UP (ref 0.5–2)
LYMPHOCYTES # BLD AUTO: 0.18 K/UL — LOW (ref 1–3.3)
LYMPHOCYTES # BLD AUTO: 0.8 % — LOW (ref 13–44)
MANUAL SMEAR VERIFICATION: SIGNIFICANT CHANGE UP
MCHC RBC-ENTMCNC: 27.6 PG — SIGNIFICANT CHANGE UP (ref 27–34)
MCHC RBC-ENTMCNC: 32.9 GM/DL — SIGNIFICANT CHANGE UP (ref 32–36)
MCV RBC AUTO: 83.7 FL — SIGNIFICANT CHANGE UP (ref 80–100)
MICROCYTES BLD QL: SLIGHT — SIGNIFICANT CHANGE UP
MONOCYTES # BLD AUTO: 0.57 K/UL — SIGNIFICANT CHANGE UP (ref 0–0.9)
MONOCYTES NFR BLD AUTO: 2.6 % — SIGNIFICANT CHANGE UP (ref 2–14)
NEUTROPHILS # BLD AUTO: 21.08 K/UL — HIGH (ref 1.8–7.4)
NEUTROPHILS NFR BLD AUTO: 94.9 % — HIGH (ref 43–77)
NEUTS BAND # BLD: 0.9 % — SIGNIFICANT CHANGE UP (ref 0–8)
NT-PROBNP SERPL-SCNC: 1597 PG/ML — HIGH (ref 0–300)
OVALOCYTES BLD QL SMEAR: SLIGHT — SIGNIFICANT CHANGE UP
PCO2 BLDV: 38 MMHG — LOW (ref 39–42)
PH BLDV: 7.41 — SIGNIFICANT CHANGE UP (ref 7.32–7.43)
PLAT MORPH BLD: NORMAL — SIGNIFICANT CHANGE UP
PLATELET # BLD AUTO: 298 K/UL — SIGNIFICANT CHANGE UP (ref 150–400)
PO2 BLDV: 65 MMHG — HIGH (ref 25–45)
POIKILOCYTOSIS BLD QL AUTO: SIGNIFICANT CHANGE UP
POTASSIUM BLDV-SCNC: 3 MMOL/L — LOW (ref 3.5–5.1)
POTASSIUM SERPL-MCNC: 3.7 MMOL/L — SIGNIFICANT CHANGE UP (ref 3.5–5.3)
POTASSIUM SERPL-SCNC: 3.7 MMOL/L — SIGNIFICANT CHANGE UP (ref 3.5–5.3)
PROT SERPL-MCNC: 6.4 G/DL — SIGNIFICANT CHANGE UP (ref 6–8.3)
PROTHROM AB SERPL-ACNC: 18.1 SEC — HIGH (ref 10.5–13.4)
RBC # BLD: 4.97 M/UL — SIGNIFICANT CHANGE UP (ref 3.8–5.2)
RBC # FLD: 14.5 % — SIGNIFICANT CHANGE UP (ref 10.3–14.5)
RBC BLD AUTO: ABNORMAL
SAO2 % BLDV: 93.6 % — HIGH (ref 67–88)
SODIUM SERPL-SCNC: 136 MMOL/L — SIGNIFICANT CHANGE UP (ref 135–145)
TROPONIN T, HIGH SENSITIVITY RESULT: 17 NG/L — SIGNIFICANT CHANGE UP (ref 0–51)
TROPONIN T, HIGH SENSITIVITY RESULT: 20 NG/L — SIGNIFICANT CHANGE UP (ref 0–51)
WBC # BLD: 22 K/UL — HIGH (ref 3.8–10.5)
WBC # FLD AUTO: 22 K/UL — HIGH (ref 3.8–10.5)

## 2023-03-17 PROCEDURE — 71045 X-RAY EXAM CHEST 1 VIEW: CPT | Mod: 26

## 2023-03-17 PROCEDURE — 99285 EMERGENCY DEPT VISIT HI MDM: CPT | Mod: CS,GC

## 2023-03-17 RX ORDER — VANCOMYCIN HCL 1 G
1000 VIAL (EA) INTRAVENOUS ONCE
Refills: 0 | Status: COMPLETED | OUTPATIENT
Start: 2023-03-17 | End: 2023-03-17

## 2023-03-17 RX ORDER — SODIUM CHLORIDE 9 MG/ML
1000 INJECTION INTRAMUSCULAR; INTRAVENOUS; SUBCUTANEOUS ONCE
Refills: 0 | Status: COMPLETED | OUTPATIENT
Start: 2023-03-17 | End: 2023-03-17

## 2023-03-17 RX ORDER — PIPERACILLIN AND TAZOBACTAM 4; .5 G/20ML; G/20ML
3.38 INJECTION, POWDER, LYOPHILIZED, FOR SOLUTION INTRAVENOUS ONCE
Refills: 0 | Status: COMPLETED | OUTPATIENT
Start: 2023-03-17 | End: 2023-03-17

## 2023-03-17 RX ORDER — ACETAMINOPHEN 500 MG
1000 TABLET ORAL ONCE
Refills: 0 | Status: COMPLETED | OUTPATIENT
Start: 2023-03-17 | End: 2023-03-17

## 2023-03-17 RX ADMIN — Medication 400 MILLIGRAM(S): at 21:03

## 2023-03-17 RX ADMIN — SODIUM CHLORIDE 1000 MILLILITER(S): 9 INJECTION INTRAMUSCULAR; INTRAVENOUS; SUBCUTANEOUS at 21:03

## 2023-03-17 RX ADMIN — Medication 250 MILLIGRAM(S): at 22:53

## 2023-03-17 RX ADMIN — Medication 1000 MILLIGRAM(S): at 21:18

## 2023-03-17 RX ADMIN — PIPERACILLIN AND TAZOBACTAM 3.38 GRAM(S): 4; .5 INJECTION, POWDER, LYOPHILIZED, FOR SOLUTION INTRAVENOUS at 22:30

## 2023-03-17 RX ADMIN — PIPERACILLIN AND TAZOBACTAM 200 GRAM(S): 4; .5 INJECTION, POWDER, LYOPHILIZED, FOR SOLUTION INTRAVENOUS at 22:00

## 2023-03-17 RX ADMIN — SODIUM CHLORIDE 1000 MILLILITER(S): 9 INJECTION INTRAMUSCULAR; INTRAVENOUS; SUBCUTANEOUS at 22:03

## 2023-03-17 RX ADMIN — Medication 1000 MILLIGRAM(S): at 21:33

## 2023-03-17 NOTE — ED ADULT NURSE NOTE - NSIMPLEMENTINTERV_GEN_ALL_ED
no
Implemented All Universal Safety Interventions:  [place] to call system. Call bell, personal items and telephone within reach. Instruct patient to call for assistance. Room bathroom lighting operational. Non-slip footwear when patient is off stretcher. Physically safe environment: no spills, clutter or unnecessary equipment. Stretcher in lowest position, wheels locked, appropriate side rails in place.

## 2023-03-17 NOTE — ED PROVIDER NOTE - ATTENDING CONTRIBUTION TO CARE
89 y/o female w/ PMH HTN, colon cancer, macular degeneration w/ PSH s/p abdominal hysterectomy c/o 1 day history of generalized weakness, fever (101 degrees F at home), vomiting, and SOB. Patient went to urgent care found to be hypoxic.  Patient is in no respiratory distress likely viral illness versus pneumonia septic work-up was initiated due to her initial set of vital signs and suspicion of infection and age comorbidities.

## 2023-03-17 NOTE — ED ADULT NURSE NOTE - OBJECTIVE STATEMENT
89y Female presents to the ED brought in by EMS from urgent care c/o SOB. PMH, HTN. As per EMS Pt went to urgent care due to increased lethargy and cough, Pts O2 found to be 76% on RA. EMS arrived and placed Pt on 10L non rebreather and Pts O2 went up to 98%. On arrival Pt placed on 4L nasal cannula sating at 97%. Pts daughters at bedside state Pt was seen in the ED recently and had Enterorhino virus. Pts daughters states she has had 1 day of AMS. As per EMS Pt was pale in color before being placed on oxygen, Pt became more alert since being placed on oxygen. Pt is A&Ox3, and ambulatory. Respirations spontaneous, unlabored, and equal bilaterally. Patient safety maintained, bed is in lowest position, wheels locked, and side rails raised. Patient oriented to call bell, and call bell is within reach.

## 2023-03-17 NOTE — ED ADULT NURSE REASSESSMENT NOTE - NS ED NURSE REASSESS COMMENT FT1
Patient straight catheterized for sterile urine sample. Aseptic technique maintained. Two RNs at bedside during procedure. Dark yellow urine drained. 200cc's drained, catheter removed prior to bladder being emptied.

## 2023-03-17 NOTE — ED PROVIDER NOTE - CLINICAL SUMMARY MEDICAL DECISION MAKING FREE TEXT BOX
89 y/o female w/ PMH HTN, colon cancer, macular degeneration w/ PSH s/p abdominal hysterectomy c/o 1 day history of generalized weakness, fever (101 degrees F at home), vomiting, and SOB. Pt had 2 week history of cough, congestion. Pt has an increased in furosemide 40mg every other day, increased amlodipine 5mg to 10mg. Pt went to urgent care today, found to be hypoxic on room air sent to ED for workup. Pt is not answering ROS questions at this time. Febrile orally to 38 degrees C, concerning for infection. Pt is more tired, concerning for UTI. Will draw sepsis labs, eval for source, and likely admit for hypoxia on room air 2/2 likely infection.

## 2023-03-17 NOTE — ED PROVIDER NOTE - HIV OFFER
Previously Declined (within the last year) Billing Type: Third-Party Bill Bill For Surgical Tray: no Expected Date Of Service: 01/30/2023

## 2023-03-17 NOTE — ED PROVIDER NOTE - OBJECTIVE STATEMENT
89 y/o female w/ PMH HTN, colon cancer, macular degeneration w/ PSH s/p abdominal hysterectomy c/o 1 day history of generalized weakness, fever (101 degrees F at home), vomiting, and SOB. Pt had 2 week history of cough, congestion. Pt has an increased in furosemide 40mg every other day, increased amlodipine 5mg to 10mg. Pt went to urgent care today, found to be hypoxic on room air sent to ED for workup. Pt is not answering ROS questions at this time.

## 2023-03-17 NOTE — ED PROVIDER NOTE - PHYSICAL EXAMINATION
\GENERAL: NAD  HEENT:  Atraumatic  CHEST/LUNG: Chest rise equal bilaterally  HEART: Regular rate and rhythm  ABDOMEN: Soft, Nontender, Nondistended  EXTREMITIES:  Extremities warm  PSYCH: A&Ox3  SKIN: No obvious rashes or lesions  NEUROLOGY: strength and sensation intact in all extremities

## 2023-03-18 DIAGNOSIS — R53.83 OTHER FATIGUE: ICD-10-CM

## 2023-03-18 DIAGNOSIS — N39.0 URINARY TRACT INFECTION, SITE NOT SPECIFIED: ICD-10-CM

## 2023-03-18 DIAGNOSIS — J96.01 ACUTE RESPIRATORY FAILURE WITH HYPOXIA: ICD-10-CM

## 2023-03-18 DIAGNOSIS — F41.1 GENERALIZED ANXIETY DISORDER: ICD-10-CM

## 2023-03-18 DIAGNOSIS — I10 ESSENTIAL (PRIMARY) HYPERTENSION: ICD-10-CM

## 2023-03-18 DIAGNOSIS — Z29.9 ENCOUNTER FOR PROPHYLACTIC MEASURES, UNSPECIFIED: ICD-10-CM

## 2023-03-18 LAB
ALBUMIN SERPL ELPH-MCNC: 2.9 G/DL — LOW (ref 3.3–5)
ALP SERPL-CCNC: 157 U/L — HIGH (ref 40–120)
ALT FLD-CCNC: 10 U/L — SIGNIFICANT CHANGE UP (ref 10–45)
ANION GAP SERPL CALC-SCNC: 10 MMOL/L — SIGNIFICANT CHANGE UP (ref 5–17)
APPEARANCE UR: ABNORMAL
AST SERPL-CCNC: 10 U/L — SIGNIFICANT CHANGE UP (ref 10–40)
BACTERIA # UR AUTO: ABNORMAL
BASE EXCESS BLDV CALC-SCNC: 1.3 MMOL/L — SIGNIFICANT CHANGE UP (ref -2–3)
BILIRUB SERPL-MCNC: 0.6 MG/DL — SIGNIFICANT CHANGE UP (ref 0.2–1.2)
BILIRUB UR-MCNC: NEGATIVE — SIGNIFICANT CHANGE UP
BUN SERPL-MCNC: 16 MG/DL — SIGNIFICANT CHANGE UP (ref 7–23)
CA-I SERPL-SCNC: 1.27 MMOL/L — SIGNIFICANT CHANGE UP (ref 1.15–1.33)
CALCIUM SERPL-MCNC: 8.9 MG/DL — SIGNIFICANT CHANGE UP (ref 8.4–10.5)
CHLORIDE BLDV-SCNC: 100 MMOL/L — SIGNIFICANT CHANGE UP (ref 96–108)
CHLORIDE SERPL-SCNC: 101 MMOL/L — SIGNIFICANT CHANGE UP (ref 96–108)
CO2 BLDV-SCNC: 27 MMOL/L — HIGH (ref 22–26)
CO2 SERPL-SCNC: 24 MMOL/L — SIGNIFICANT CHANGE UP (ref 22–31)
COLOR SPEC: ABNORMAL
CREAT SERPL-MCNC: 0.48 MG/DL — LOW (ref 0.5–1.3)
DIFF PNL FLD: NEGATIVE — SIGNIFICANT CHANGE UP
EGFR: 87 ML/MIN/1.73M2 — SIGNIFICANT CHANGE UP
EPI CELLS # UR: 0 /HPF — SIGNIFICANT CHANGE UP
GAS PNL BLDV: 130 MMOL/L — LOW (ref 136–145)
GAS PNL BLDV: SIGNIFICANT CHANGE UP
GAS PNL BLDV: SIGNIFICANT CHANGE UP
GLUCOSE BLDV-MCNC: 100 MG/DL — HIGH (ref 70–99)
GLUCOSE SERPL-MCNC: 108 MG/DL — HIGH (ref 70–99)
GLUCOSE UR QL: NEGATIVE — SIGNIFICANT CHANGE UP
HCO3 BLDV-SCNC: 26 MMOL/L — SIGNIFICANT CHANGE UP (ref 22–29)
HCT VFR BLD CALC: 35.7 % — SIGNIFICANT CHANGE UP (ref 34.5–45)
HCT VFR BLDA CALC: 36 % — SIGNIFICANT CHANGE UP (ref 34.5–46.5)
HGB BLD CALC-MCNC: 12 G/DL — SIGNIFICANT CHANGE UP (ref 11.7–16.1)
HGB BLD-MCNC: 11.5 G/DL — SIGNIFICANT CHANGE UP (ref 11.5–15.5)
HOROWITZ INDEX BLDV+IHG-RTO: SIGNIFICANT CHANGE UP
HYALINE CASTS # UR AUTO: 42 /LPF — HIGH (ref 0–2)
KETONES UR-MCNC: NEGATIVE — SIGNIFICANT CHANGE UP
LACTATE BLDV-MCNC: 1.1 MMOL/L — SIGNIFICANT CHANGE UP (ref 0.5–2)
LACTATE SERPL-SCNC: 0.9 MMOL/L — SIGNIFICANT CHANGE UP (ref 0.5–2)
LEUKOCYTE ESTERASE UR-ACNC: ABNORMAL
MAGNESIUM SERPL-MCNC: 1.9 MG/DL — SIGNIFICANT CHANGE UP (ref 1.6–2.6)
MCHC RBC-ENTMCNC: 27 PG — SIGNIFICANT CHANGE UP (ref 27–34)
MCHC RBC-ENTMCNC: 32.2 GM/DL — SIGNIFICANT CHANGE UP (ref 32–36)
MCV RBC AUTO: 83.8 FL — SIGNIFICANT CHANGE UP (ref 80–100)
NITRITE UR-MCNC: NEGATIVE — SIGNIFICANT CHANGE UP
NRBC # BLD: 0 /100 WBCS — SIGNIFICANT CHANGE UP (ref 0–0)
PCO2 BLDV: 40 MMHG — SIGNIFICANT CHANGE UP (ref 39–42)
PH BLDV: 7.42 — SIGNIFICANT CHANGE UP (ref 7.32–7.43)
PH UR: 6 — SIGNIFICANT CHANGE UP (ref 5–8)
PHOSPHATE SERPL-MCNC: 3.2 MG/DL — SIGNIFICANT CHANGE UP (ref 2.5–4.5)
PLATELET # BLD AUTO: 255 K/UL — SIGNIFICANT CHANGE UP (ref 150–400)
PO2 BLDV: 52 MMHG — HIGH (ref 25–45)
POTASSIUM BLDV-SCNC: 2.9 MMOL/L — CRITICAL LOW (ref 3.5–5.1)
POTASSIUM SERPL-MCNC: 3.1 MMOL/L — LOW (ref 3.5–5.3)
POTASSIUM SERPL-SCNC: 3.1 MMOL/L — LOW (ref 3.5–5.3)
PROCALCITONIN SERPL-MCNC: 0.37 NG/ML — HIGH (ref 0.02–0.1)
PROT SERPL-MCNC: 5.4 G/DL — LOW (ref 6–8.3)
PROT UR-MCNC: ABNORMAL
RAPID RVP RESULT: SIGNIFICANT CHANGE UP
RBC # BLD: 4.26 M/UL — SIGNIFICANT CHANGE UP (ref 3.8–5.2)
RBC # FLD: 14.4 % — SIGNIFICANT CHANGE UP (ref 10.3–14.5)
RBC CASTS # UR COMP ASSIST: 4 /HPF — SIGNIFICANT CHANGE UP (ref 0–4)
SAO2 % BLDV: 84.1 % — SIGNIFICANT CHANGE UP (ref 67–88)
SARS-COV-2 RNA SPEC QL NAA+PROBE: SIGNIFICANT CHANGE UP
SODIUM SERPL-SCNC: 135 MMOL/L — SIGNIFICANT CHANGE UP (ref 135–145)
SP GR SPEC: 1.03 — HIGH (ref 1.01–1.02)
UROBILINOGEN FLD QL: ABNORMAL
WBC # BLD: 16.52 K/UL — HIGH (ref 3.8–10.5)
WBC # FLD AUTO: 16.52 K/UL — HIGH (ref 3.8–10.5)
WBC UR QL: 34 /HPF — HIGH (ref 0–5)

## 2023-03-18 PROCEDURE — 12345: CPT | Mod: NC

## 2023-03-18 PROCEDURE — 99223 1ST HOSP IP/OBS HIGH 75: CPT

## 2023-03-18 PROCEDURE — 71250 CT THORAX DX C-: CPT | Mod: 26

## 2023-03-18 RX ORDER — PANTOPRAZOLE SODIUM 20 MG/1
40 TABLET, DELAYED RELEASE ORAL DAILY
Refills: 0 | Status: DISCONTINUED | OUTPATIENT
Start: 2023-03-18 | End: 2023-03-19

## 2023-03-18 RX ORDER — ASPIRIN/CALCIUM CARB/MAGNESIUM 324 MG
81 TABLET ORAL DAILY
Refills: 0 | Status: DISCONTINUED | OUTPATIENT
Start: 2023-03-18 | End: 2023-03-23

## 2023-03-18 RX ORDER — FUROSEMIDE 40 MG
1 TABLET ORAL
Qty: 0 | Refills: 0 | DISCHARGE

## 2023-03-18 RX ORDER — VANCOMYCIN HCL 1 G
750 VIAL (EA) INTRAVENOUS EVERY 12 HOURS
Refills: 0 | Status: DISCONTINUED | OUTPATIENT
Start: 2023-03-18 | End: 2023-03-19

## 2023-03-18 RX ORDER — FUROSEMIDE 40 MG
20 TABLET ORAL DAILY
Refills: 0 | Status: DISCONTINUED | OUTPATIENT
Start: 2023-03-18 | End: 2023-03-19

## 2023-03-18 RX ORDER — METOPROLOL TARTRATE 50 MG
50 TABLET ORAL DAILY
Refills: 0 | Status: DISCONTINUED | OUTPATIENT
Start: 2023-03-18 | End: 2023-03-23

## 2023-03-18 RX ORDER — POTASSIUM CHLORIDE 20 MEQ
10 PACKET (EA) ORAL ONCE
Refills: 0 | Status: COMPLETED | OUTPATIENT
Start: 2023-03-18 | End: 2023-03-18

## 2023-03-18 RX ORDER — PIPERACILLIN AND TAZOBACTAM 4; .5 G/20ML; G/20ML
3.38 INJECTION, POWDER, LYOPHILIZED, FOR SOLUTION INTRAVENOUS EVERY 8 HOURS
Refills: 0 | Status: DISCONTINUED | OUTPATIENT
Start: 2023-03-18 | End: 2023-03-19

## 2023-03-18 RX ORDER — OMEPRAZOLE 10 MG/1
1 CAPSULE, DELAYED RELEASE ORAL
Qty: 0 | Refills: 0 | DISCHARGE

## 2023-03-18 RX ORDER — ACETAMINOPHEN 500 MG
1000 TABLET ORAL ONCE
Refills: 0 | Status: COMPLETED | OUTPATIENT
Start: 2023-03-18 | End: 2023-03-18

## 2023-03-18 RX ORDER — LOSARTAN POTASSIUM 100 MG/1
100 TABLET, FILM COATED ORAL DAILY
Refills: 0 | Status: DISCONTINUED | OUTPATIENT
Start: 2023-03-18 | End: 2023-03-23

## 2023-03-18 RX ORDER — SODIUM CHLORIDE 9 MG/ML
1000 INJECTION INTRAMUSCULAR; INTRAVENOUS; SUBCUTANEOUS
Refills: 0 | Status: DISCONTINUED | OUTPATIENT
Start: 2023-03-18 | End: 2023-03-18

## 2023-03-18 RX ORDER — LOSARTAN POTASSIUM 100 MG/1
1 TABLET, FILM COATED ORAL
Qty: 0 | Refills: 0 | DISCHARGE

## 2023-03-18 RX ORDER — ATORVASTATIN CALCIUM 80 MG/1
10 TABLET, FILM COATED ORAL AT BEDTIME
Refills: 0 | Status: DISCONTINUED | OUTPATIENT
Start: 2023-03-18 | End: 2023-03-23

## 2023-03-18 RX ORDER — SERTRALINE 25 MG/1
100 TABLET, FILM COATED ORAL DAILY
Refills: 0 | Status: DISCONTINUED | OUTPATIENT
Start: 2023-03-18 | End: 2023-03-23

## 2023-03-18 RX ORDER — POTASSIUM CHLORIDE 20 MEQ
40 PACKET (EA) ORAL ONCE
Refills: 0 | Status: COMPLETED | OUTPATIENT
Start: 2023-03-18 | End: 2023-03-18

## 2023-03-18 RX ORDER — ENOXAPARIN SODIUM 100 MG/ML
40 INJECTION SUBCUTANEOUS EVERY 24 HOURS
Refills: 0 | Status: DISCONTINUED | OUTPATIENT
Start: 2023-03-18 | End: 2023-03-23

## 2023-03-18 RX ADMIN — LOSARTAN POTASSIUM 100 MILLIGRAM(S): 100 TABLET, FILM COATED ORAL at 08:17

## 2023-03-18 RX ADMIN — PANTOPRAZOLE SODIUM 40 MILLIGRAM(S): 20 TABLET, DELAYED RELEASE ORAL at 11:43

## 2023-03-18 RX ADMIN — ENOXAPARIN SODIUM 40 MILLIGRAM(S): 100 INJECTION SUBCUTANEOUS at 05:46

## 2023-03-18 RX ADMIN — Medication 50 MILLIGRAM(S): at 08:17

## 2023-03-18 RX ADMIN — Medication 81 MILLIGRAM(S): at 11:43

## 2023-03-18 RX ADMIN — Medication 250 MILLIGRAM(S): at 05:41

## 2023-03-18 RX ADMIN — PIPERACILLIN AND TAZOBACTAM 25 GRAM(S): 4; .5 INJECTION, POWDER, LYOPHILIZED, FOR SOLUTION INTRAVENOUS at 07:30

## 2023-03-18 RX ADMIN — Medication 250 MILLIGRAM(S): at 17:20

## 2023-03-18 RX ADMIN — Medication 40 MILLIEQUIVALENT(S): at 05:42

## 2023-03-18 RX ADMIN — SERTRALINE 100 MILLIGRAM(S): 25 TABLET, FILM COATED ORAL at 11:43

## 2023-03-18 RX ADMIN — Medication 20 MILLIGRAM(S): at 08:17

## 2023-03-18 RX ADMIN — Medication 1000 MILLIGRAM(S): at 21:56

## 2023-03-18 RX ADMIN — PIPERACILLIN AND TAZOBACTAM 25 GRAM(S): 4; .5 INJECTION, POWDER, LYOPHILIZED, FOR SOLUTION INTRAVENOUS at 14:00

## 2023-03-18 RX ADMIN — Medication 400 MILLIGRAM(S): at 21:41

## 2023-03-18 RX ADMIN — ATORVASTATIN CALCIUM 10 MILLIGRAM(S): 80 TABLET, FILM COATED ORAL at 22:00

## 2023-03-18 RX ADMIN — PIPERACILLIN AND TAZOBACTAM 25 GRAM(S): 4; .5 INJECTION, POWDER, LYOPHILIZED, FOR SOLUTION INTRAVENOUS at 22:00

## 2023-03-18 RX ADMIN — Medication 100 MILLIEQUIVALENT(S): at 05:42

## 2023-03-18 RX ADMIN — Medication 1 DROP(S): at 03:36

## 2023-03-18 NOTE — H&P ADULT - HISTORY OF PRESENT ILLNESS
89yo F w/ PMHx of [place], macular degeneration, mci/dementia, ?meningiomas, HTN, HLD, CVA, PUD, colon ca s/p resection, bezoar, gastroparesis, presents with lethargy, history obtained from daughters at bedside, they report that the home health aid called them to report that the pt was not feeling well, she vomiting, was more lethargic and had a fever of 101, pt recently admitted to [facility] 2/17 for acute hypoxic respiratory failure secondary to rhino/enterovirus infection and severe hypertension, daughters report that since discharge, she has had an ongoing cough, with clear phlegm production and SOB with occasional wheezing, she has worsening confusion over the last few months, they deny chest pain, changes in stool, urinary symptoms, slurred speech, weakness on one side compared to other, changes in ambulation, in the ED, pt was hypoxic requiring NRB which was then weaned down to 1L NC, labs were notable for leukocytosis, grossly positive U/A, BNP mildly elevated above prior, pt was given Vanc/Zosyn, 1L NS, Ofirmev x1, admitted to general medicine for further management .

## 2023-03-18 NOTE — SWALLOW BEDSIDE ASSESSMENT ADULT - SLP GENERAL OBSERVATIONS
Pt encountered upright in bed, on 3LNC, + Terrence (VSS) sleeping. Daughters at bedside. Pt roused via verbal stimuli. Pt remained awake/alert for remainder of evaluation. Oriented to self, month, and general location. Vocal quality clear. Able to follow 75% of simple directives.

## 2023-03-18 NOTE — H&P ADULT - NSHPPHYSICALEXAM_GEN_ALL_CORE
Vital Signs Last 24 Hrs  T(C): 37.2 (18 Mar 2023 01:50), Max: 38 (17 Mar 2023 20:23)  T(F): 98.9 (18 Mar 2023 01:50), Max: 100.4 (17 Mar 2023 20:23)  HR: 65 (18 Mar 2023 01:16) (65 - 85)  BP: 124/72 (18 Mar 2023 01:16) (107/54 - 146/76)  BP(mean): 71 (17 Mar 2023 23:20) (71 - 97)  RR: 18 (18 Mar 2023 01:16) (18 - 20)  SpO2: 94% (18 Mar 2023 01:16) (94% - 99%)    Parameters below as of 18 Mar 2023 01:16  Patient On (Oxygen Delivery Method): nasal cannula  O2 Flow (L/min): 1

## 2023-03-18 NOTE — H&P ADULT - PROBLEM SELECTOR PLAN 2
-pt CXR with small bilateral pleural effusions and atelectasis similar to prior imaging from 2/2023  -last echo 2018 with mild diastolic dysfunction and mildly elevated BNP today   -low suspicion of ACS, troponin negative x2, EKG sinus without ischemic changes   -known pulmonary cysts on prior imaging   -possible superimposed pneumonia   -weaned from NRB to 1LPM NC  -empiric treatment for pneumonia and UTI  -being cautious with diuretics as pt with soft blood pressures in setting of infection  -obtain echo   -continuous pulse ox  -Monitor SpO2, RR, for signs of respiratory distress; Goal SpO2 >88-92%, PaO2 >55-60 mmHg  -bronchodilators + oxygen supplementation as needed to maintain goal  -aggressive pulmonary toilet/hygiene with incentive spirometry/acapella device, chest pt, nebusal/mucomyst nebs  -symptomatic relief with antitussives, mucolytics, antipyretics as needed. -pt CXR with small bilateral pleural effusions and atelectasis similar to prior imaging from 2/2023  -last echo 2018 with mild diastolic dysfunction and mildly elevated BNP today   -low suspicion of ACS, troponin negative x2, EKG sinus without ischemic changes   -known pulmonary cysts on prior imaging   -possible superimposed pneumonia   -weaned from NRB to 1LPM NC  -empiric treatment for pneumonia and UTI as above   -being cautious with diuretics as pt with soft blood pressures in setting of infection  -obtain echo   -continuous pulse ox  -Monitor SpO2, RR, for signs of respiratory distress; Goal SpO2 >88-92%, PaO2 >55-60 mmHg  -bronchodilators + oxygen supplementation as needed to maintain goal  -aggressive pulmonary toilet/hygiene with incentive spirometry/acapella device, chest pt, nebusal/mucomyst nebs  -symptomatic relief with antitussives, mucolytics, antipyretics as needed. -pt CXR with small bilateral pleural effusions and atelectasis similar to prior imaging from 2/2023  -last echo 2018 with mild diastolic dysfunction and mildly elevated BNP today   -low suspicion of ACS, troponin negative x2, EKG sinus without ischemic changes   -known pulmonary cysts on prior imaging   -possible superimposed pneumonia   -weaned from NRB to 1LPM NC  -empiric treatment for pneumonia and UTI as above   -being cautious with diuretics as pt with soft blood pressures in setting of infection, holding home lasix for now  -obtain echo   -continuous pulse ox  -Monitor SpO2, RR, for signs of respiratory distress; Goal SpO2 >88-92%, PaO2 >55-60 mmHg  -bronchodilators + oxygen supplementation as needed to maintain goal  -aggressive pulmonary toilet/hygiene with incentive spirometry/acapella device, chest pt, nebusal/mucomyst nebs  -symptomatic relief with antitussives, mucolytics, antipyretics as needed. -pt CXR with small bilateral pleural effusions and atelectasis similar to prior imaging from 2/2023  -last echo 2018 with mild diastolic dysfunction and mildly elevated BNP today   -low suspicion of ACS, troponin negative x2, EKG sinus without ischemic changes   -known pulmonary cysts on prior imaging   -possible superimposed pneumonia   -weaned from NRB to 1LPM NC  -empiric treatment for pneumonia and UTI as above   -being cautious with diuretics as pt with oscillating blood pressures in setting of infection, starting lasix 20mg IV daily   -obtain echo   -continuous pulse ox

## 2023-03-18 NOTE — PATIENT PROFILE ADULT - NSPROIMPLANTSMEDDEV_GEN_A_NUR
Left message letting patient know we would be calling her about her test and that I was ordering further testing   Attending Only None

## 2023-03-18 NOTE — H&P ADULT - PROBLEM SELECTOR PLAN 6
dvt ppx: lovenox  ambulate: with assistance  diet: NPO pending improvement in mental status, hold home PO meds as well in interim   gi ppx: omeprazole --> pantoprazole IV dvt ppx: lovenox  ambulate: with assistance  diet: pureed, speech eval   gi ppx: omeprazole --> pantoprazole IV

## 2023-03-18 NOTE — PATIENT PROFILE ADULT - FALL HARM RISK - HARM RISK INTERVENTIONS

## 2023-03-18 NOTE — SWALLOW BEDSIDE ASSESSMENT ADULT - SLP PERTINENT HISTORY OF CURRENT PROBLEM
89yo F w/ PMHx of [place], macular degeneration, mci/dementia, ?meningiomas, HTN, HLD, CVA, PUD, colon ca s/p resection, bezoar, gastroparesis, presents with lethargy, fever, and acute hypoxic respiratory failure concerning for urinary tract infection vs pneumonia.

## 2023-03-18 NOTE — SWALLOW BEDSIDE ASSESSMENT ADULT - ASR SWALLOW DENTITION
One missing tooth on mandible, wears partial denture at home. Daughter endorsed she can eat without it/present and adequate

## 2023-03-18 NOTE — SWALLOW BEDSIDE ASSESSMENT ADULT - PHARYNGEAL PHASE
mild latency in the swallow response, palpable hyolaryngeal elevation, and no overt s/s aspiration on repeated trials/serial sips of solids/liquids.

## 2023-03-18 NOTE — PHYSICAL THERAPY INITIAL EVALUATION ADULT - ADDITIONAL COMMENTS
Pt lives in a pvt house alone on the ground floor. Pt has a 24/7 HHA. Pt amb w/ RW PTA. Spoke to daughter Tana ([phone]) for history as pt. is [place] and confused. Pt lives alone in a pvt house with elevator to enter. Pt has a 24/7 HHA. Pt amb w/ RW prior to admission. Pt. owns w/c and shower chair. Pt. was independent in all transfers with assist in all IADLS.

## 2023-03-18 NOTE — SWALLOW BEDSIDE ASSESSMENT ADULT - ASR SWALLOW ASPIRATION MONITOR
Monitor for s/s aspiration/laryngeal penetration. If noted:  D/C p.o. intake, provide non-oral nutrition/hydration/meds, and contact this service @ [phone]/change of breathing pattern/cough/gurgly voice/fever/pneumonia/throat clearing/upper respiratory infection

## 2023-03-18 NOTE — PATIENT PROFILE ADULT - FUNCTIONAL ASSESSMENT - BASIC MOBILITY SECTION LABEL
Mucosal Advancement Flap Text: Because of the full-thickness nature of the wound and in order to restore and maintain function, a mucosal advancement flap was planned. After prep and local anesthesia, Burow’s triangles were excised adjacent to the defect.  Two flaps were created by undermining in the deep subcutaneous plane over the orbicularis oris. After hemostasis, the flaps were advanced and closed in a layered fashion, with care to maintain vermillion border and oral commissures. .

## 2023-03-18 NOTE — SWALLOW BEDSIDE ASSESSMENT ADULT - SWALLOW EVAL: DIAGNOSIS
Pt is a 89 y/o female w/ dementia p/w lethargy, fever, and acute hypoxic respiratory failure concerning for urinary tract infection vs pneumonia. Patient presents on bedside swallow evaluation with a mild oropharyngeal dysphagia. Swallow sequence remarkable for slowed, however efficient mastication of regular solids, delayed oral transit time, inconsistent piecemeal deglutition for solids/liquids suspect behavioral, mild latency in the swallow response, palpable hyolaryngeal elevation, and no overt s/s aspiration on repeated trials/serial sips of solids/liquids. Can consider instrumental assessment given pt's h/o intermittent coughing with PO intake and workup c/f PNA. Patient pending CT chest.

## 2023-03-18 NOTE — H&P ADULT - ASSESSMENT
89yo F w/ PMHx of [place], macular degeneration, mci/dementia, ?meningiomas, HTN, HLD, CVA, PUD, colon ca s/p resection, bezoar, gastroparesis, presents with lethargy, fever, and acute hypoxic respiratory failure concerning for urinary tract infection vs pneumonia

## 2023-03-18 NOTE — PHYSICAL THERAPY INITIAL EVALUATION ADULT - PERTINENT HX OF CURRENT PROBLEM, REHAB EVAL
89yo F w/ PMHx of [place], macular degeneration, mci/dementia, ?meningiomas, HTN, HLD, CVA, PUD, colon ca s/p resection, bezoar, gastroparesis, presents with lethargy, fever, and acute hypoxic respiratory failure concerning for urinary tract infection vs pneumonia. 3/17 Chest x-ray: Small bilateral pleural effusions with bibasilar opacities, likely atelectasis however potential underlying infection in proper clinical   context cannot be excluded. 89yo F w/ PMHx of [place], macular degeneration, mci/dementia, ?meningiomas, HTN, HLD, CVA, PUD, colon ca s/p resection, bezoar, gastroparesis, presents with lethargy, fever, and acute hypoxic respiratory failure concerning for urinary tract infection vs pneumonia. 3/17 Chest x-ray: Small bilateral pleural effusions with bibasilar opacities, likely atelectasis however potential underlying infection in proper clinical   context cannot be excluded. CT Chest 3/18 + Stable lung nodules and thoracic lymphadenopathy since 2/17/2023. Increased small bilateral pleural effusions with associated passive   atelectasis since 2/17/2023.

## 2023-03-18 NOTE — PROGRESS NOTE ADULT - PROBLEM SELECTOR PLAN 3
- much improved  - likely metabolic encephalopathy from active infection w/ component of acute delirium in setting of known cognitive impairment, of note multiple previous admissions complicated by lethargy from metabolic encephalopathy   -prior head imaging has shown CVA, meningioma and vascular disease but currently pt mental status is waxing and waning suggesting delirium   -maintain fall, seizure, aspiration precautions; keep head end of bed elevated  -delirium precautions

## 2023-03-18 NOTE — SWALLOW BEDSIDE ASSESSMENT ADULT - SWALLOW EVAL: PATIENT/FAMILY GOALS STATEMENT
Daughter endorsed pt inconsistently coughs when eating/drinking at home when shes tired or talking while eating, however no h/o PNA. Stated she would consider MBS upon discharge as an outpatient.

## 2023-03-18 NOTE — H&P ADULT - PROBLEM SELECTOR PLAN 3
-likely in setting of active infection, with component of acute delirium in setting of known cognitive impairment   -prior head imaging has shown CVA, meningioma and vascular disease -suspect metabolic encephalopathy from active infection w/ component of acute delirium in setting of known cognitive impairment, of note multiple previous admissions complicated by lethargy from metabolic encephalopathy   -prior head imaging has shown CVA, meningioma and vascular disease  -obtain head CT  -maintain fall, seizure, aspiration precautions; keep head end of bed elevated  -delirium precautions (eg Minimize invasive lines + devices; avoid restraints; correct pre existing sensory deficits ie hearing aids, glasses; maintain adequate hydration; promote normal circadian rhythm ie adequate light and noise control; prevent environmental isolation ie music therapy, increase family interaction) -suspect metabolic encephalopathy from active infection w/ component of acute delirium in setting of known cognitive impairment, of note multiple previous admissions complicated by lethargy from metabolic encephalopathy   -prior head imaging has shown CVA, meningioma and vascular disease but currently pt mental status is waxing and waning suggesting delirium   -maintain fall, seizure, aspiration precautions; keep head end of bed elevated  -delirium precautions (eg Minimize invasive lines + devices; avoid restraints; correct pre existing sensory deficits ie hearing aids, glasses; maintain adequate hydration; promote normal circadian rhythm ie adequate light and noise control; prevent environmental isolation ie music therapy, increase family interaction) -suspect metabolic encephalopathy from active infection w/ component of acute delirium in setting of known cognitive impairment, of note multiple previous admissions complicated by lethargy from metabolic encephalopathy   -prior head imaging has shown CVA, meningioma and vascular disease but currently pt mental status is waxing and waning suggesting delirium   -maintain fall, seizure, aspiration precautions; keep head end of bed elevated  -delirium precautions

## 2023-03-18 NOTE — H&P ADULT - PROBLEM SELECTOR PLAN 1
-grossly positive U/A w/ 101 fever at home and significant leukocytosis concerning for UTI  -f/u urine and blood cultures   -c/w broad spectrum Vanc/Zosyn  -no history of resistant organism in our records   -Ofirmev PRN for fever -grossly positive U/A w/ 101 fever at home and significant leukocytosis concerning for UTI  -f/u urine and blood cultures   -c/w broad spectrum Vanc/Zosyn  -no history of resistant organism in our records   -Ofirmev PRN for fever  -obtain bladder scan

## 2023-03-18 NOTE — PHYSICAL THERAPY INITIAL EVALUATION ADULT - GAIT DEVIATIONS NOTED, PT EVAL
decreased francesco/increased time in double stance/decreased step length/decreased stride length/decreased weight-shifting ability

## 2023-03-18 NOTE — PROGRESS NOTE ADULT - SUBJECTIVE AND OBJECTIVE BOX
Patient is a 89y old  Female who presents with a chief complaint of urinary tract infection, lethargy, acute hypoxic respiratory failure (18 Mar 2023 12:41)      SUBJECTIVE / OVERNIGHT EVENTS:    MEDICATIONS  (STANDING):  aspirin enteric coated 81 milliGRAM(s) Oral daily  atorvastatin 10 milliGRAM(s) Oral at bedtime  enoxaparin Injectable 40 milliGRAM(s) SubCutaneous every 24 hours  furosemide   Injectable 20 milliGRAM(s) IV Push daily  losartan 100 milliGRAM(s) Oral daily  metoprolol succinate ER 50 milliGRAM(s) Oral daily  pantoprazole  Injectable 40 milliGRAM(s) IV Push daily  piperacillin/tazobactam IVPB.. 3.375 Gram(s) IV Intermittent every 8 hours  sertraline 100 milliGRAM(s) Oral daily  vancomycin  IVPB 750 milliGRAM(s) IV Intermittent every 12 hours    MEDICATIONS  (PRN):  acetaminophen   IVPB .. 1000 milliGRAM(s) IV Intermittent once PRN Temp greater or equal to 38C (100.4F)      Vital Signs Last 24 Hrs  T(C): 37.2 (18 Mar 2023 08:15), Max: 38 (17 Mar 2023 20:23)  T(F): 98.9 (18 Mar 2023 08:15), Max: 100.4 (17 Mar 2023 20:23)  HR: 86 (18 Mar 2023 08:15) (65 - 88)  BP: 145/82 (18 Mar 2023 08:15) (107/54 - 158/84)  BP(mean): 71 (17 Mar 2023 23:20) (71 - 97)  RR: 18 (18 Mar 2023 08:15) (18 - 20)  SpO2: 97% (18 Mar 2023 08:15) (94% - 99%)    Parameters below as of 18 Mar 2023 08:15  Patient On (Oxygen Delivery Method): nasal cannula  O2 Flow (L/min): 3    CAPILLARY BLOOD GLUCOSE        I&O's Summary      PHYSICAL EXAM:  GENERAL: NAD, well-developed  HEAD:  Atraumatic, Normocephalic  EYES: EOMI, PERRLA, conjunctiva and sclera clear  NECK: Supple, No JVD  CHEST/LUNG: Clear to auscultation bilaterally; No wheeze  HEART: Regular rate and rhythm; No murmurs, rubs, or gallops  ABDOMEN: Soft, Nontender, Nondistended; Bowel sounds present  EXTREMITIES:  2+ Peripheral Pulses, No clubbing, cyanosis, or edema  PSYCH: AAOx3  NEUROLOGY: non-focal  SKIN: No rashes or lesions    LABS:                        11.5   16.52 )-----------( 255      ( 18 Mar 2023 04:10 )             35.7     03-18    135  |  101  |  16  ----------------------------<  108<H>  3.1<L>   |  24  |  0.48<L>    Ca    8.9      18 Mar 2023 04:10  Phos  3.2     03-18  Mg     1.9     -18    TPro  5.4<L>  /  Alb  2.9<L>  /  TBili  0.6  /  DBili  x   /  AST  10  /  ALT  10  /  AlkPhos  157<H>  03-18    PT/INR - ( 17 Mar 2023 21:08 )   PT: 18.1 sec;   INR: 1.55 ratio         PTT - ( 17 Mar 2023 21:08 )  PTT:34.4 sec      Urinalysis Basic - ( 17 Mar 2023 23:27 )    Color: Dark Yellow / Appearance: Slightly Turbid / S.033 / pH: x  Gluc: x / Ketone: Negative  / Bili: Negative / Urobili: 2 mg/dL   Blood: x / Protein: 30 mg/dL / Nitrite: Negative   Leuk Esterase: Large / RBC: 4 /hpf / WBC 34 /HPF   Sq Epi: x / Non Sq Epi: 0 /hpf / Bacteria: Many        RADIOLOGY & ADDITIONAL TESTS:    Imaging Personally Reviewed:    Consultant(s) Notes Reviewed:      Care Discussed with Consultants/Other Providers:   Patient is a 89y old  Female who presents with a chief complaint of urinary tract infection, lethargy, acute hypoxic respiratory failure (18 Mar 2023 12:41)      SUBJECTIVE / OVERNIGHT EVENTS:  Pt seen and examined with daughters at bedside. No acute events overnight. She c/o sorethroat and cough. Denies SOB, chest pain. Also c/o dysuria. Denies fever/chills.   Per daughters pt has been coughing/choking when eating for the past 4-6 weeks.    MEDICATIONS  (STANDING):  aspirin enteric coated 81 milliGRAM(s) Oral daily  atorvastatin 10 milliGRAM(s) Oral at bedtime  enoxaparin Injectable 40 milliGRAM(s) SubCutaneous every 24 hours  furosemide   Injectable 20 milliGRAM(s) IV Push daily  losartan 100 milliGRAM(s) Oral daily  metoprolol succinate ER 50 milliGRAM(s) Oral daily  pantoprazole  Injectable 40 milliGRAM(s) IV Push daily  piperacillin/tazobactam IVPB.. 3.375 Gram(s) IV Intermittent every 8 hours  sertraline 100 milliGRAM(s) Oral daily  vancomycin  IVPB 750 milliGRAM(s) IV Intermittent every 12 hours    MEDICATIONS  (PRN):  acetaminophen   IVPB .. 1000 milliGRAM(s) IV Intermittent once PRN Temp greater or equal to 38C (100.4F)      Vital Signs Last 24 Hrs  T(C): 37.2 (18 Mar 2023 08:15), Max: 38 (17 Mar 2023 20:23)  T(F): 98.9 (18 Mar 2023 08:15), Max: 100.4 (17 Mar 2023 20:23)  HR: 86 (18 Mar 2023 08:15) (65 - 88)  BP: 145/82 (18 Mar 2023 08:15) (107/54 - 158/84)  BP(mean): 71 (17 Mar 2023 23:20) (71 - 97)  RR: 18 (18 Mar 2023 08:15) (18 - 20)  SpO2: 97% (18 Mar 2023 08:15) (94% - 99%)    Parameters below as of 18 Mar 2023 08:15  Patient On (Oxygen Delivery Method): nasal cannula  O2 Flow (L/min): 3    CAPILLARY BLOOD GLUCOSE        I&O's Summary      PHYSICAL EXAM:  GENERAL: NAD, frail, [place]  HEAD:  Atraumatic, Normocephalic  EYES: conjunctiva and sclera clear  NECK: Supple, No JVD  CHEST/LUNG: Clear to auscultation bilaterally; No wheeze  HEART: Regular rate and rhythm; No murmurs, rubs, or gallops  ABDOMEN: Soft, Nontender, Nondistended; Bowel sounds present  EXTREMITIES:  2+ Peripheral Pulses, No clubbing, cyanosis, or edema  PSYCH: AAOx3  NEUROLOGY: non-focal  SKIN: No rashes or lesions    LABS:                        11.5   16.52 )-----------( 255      ( 18 Mar 2023 04:10 )             35.7     03-18    135  |  101  |  16  ----------------------------<  108<H>  3.1<L>   |  24  |  0.48<L>    Ca    8.9      18 Mar 2023 04:10  Phos  3.2     03-18  Mg     1.9     -18    TPro  5.4<L>  /  Alb  2.9<L>  /  TBili  0.6  /  DBili  x   /  AST  10  /  ALT  10  /  AlkPhos  157<H>  03-18    PT/INR - ( 17 Mar 2023 21:08 )   PT: 18.1 sec;   INR: 1.55 ratio         PTT - ( 17 Mar 2023 21:08 )  PTT:34.4 sec      Urinalysis Basic - ( 17 Mar 2023 23:27 )    Color: Dark Yellow / Appearance: Slightly Turbid / S.033 / pH: x  Gluc: x / Ketone: Negative  / Bili: Negative / Urobili: 2 mg/dL   Blood: x / Protein: 30 mg/dL / Nitrite: Negative   Leuk Esterase: Large / RBC: 4 /hpf / WBC 34 /HPF   Sq Epi: x / Non Sq Epi: 0 /hpf / Bacteria: Many        RADIOLOGY & ADDITIONAL TESTS:    Imaging Personally Reviewed:  CXR 3/17   Small bilateral pleural effusions with bibasilar opacities, likely   atelectasis however potential underlying infection in proper clinical   context cannot be excluded.

## 2023-03-18 NOTE — H&P ADULT - PROBLEM SELECTOR PLAN 4
-hold home metoprolol, losartan, amlodipine due to soft blood pressures, active infection and lethargy -hold home metoprolol, losartan, amlodipine due to soft blood pressures, resume as tolerated -resume home metoprolol and losartan  -hold home amlodipine, resume as tolerated

## 2023-03-18 NOTE — SWALLOW BEDSIDE ASSESSMENT ADULT - COMMENTS
Urinary tract infection - grossly positive U/A w/ 101 fever at home and significant leukocytosis concerning for UTI  Acute respiratory failure with hypoxia - pt CXR with small bilateral pleural effusions and atelectasis similar to prior imaging from 2/2023  -last echo 2018 with mild diastolic dysfunction and mildly elevated BNP today    -possible superimposed pneumonia   -weaned from NRB to 1LPM NC  -empiric treatment for pneumonia and UTI as above   Lethargy - suspect metabolic encephalopathy from active infection w/ component of acute delirium in setting of known cognitive impairment, of note multiple previous admissions complicated by lethargy from metabolic encephalopathy   -prior head imaging has shown CVA, meningioma and vascular disease but currently pt mental status is waxing and waning suggesting delirium     3/17 CXR IMPRESSION:  Small bilateral pleural effusions with bibasilar opacities, likely   atelectasis however potential underlying infection in proper clinical   context cannot be excluded.    SWALLOW HISTORY: No reports in SCM or in PACS prior to this admission. Urinary tract infection - grossly positive U/A w/ 101 fever at home and significant leukocytosis concerning for UTI  Acute respiratory failure with hypoxia - pt CXR with small bilateral pleural effusions and atelectasis similar to prior imaging from 2/2023  -last echo 2018 with mild diastolic dysfunction and mildly elevated BNP today    -possible superimposed pneumonia   -weaned from NRB to 1LPM NC  -empiric treatment for pneumonia and UTI as above   Lethargy - suspect metabolic encephalopathy from active infection w/ component of acute delirium in setting of known cognitive impairment, of note multiple previous admissions complicated by lethargy from metabolic encephalopathy   -prior head imaging has shown CVA, meningioma and vascular disease but currently pt mental status is waxing and waning suggesting delirium     3/17 CXR IMPRESSION: Small bilateral pleural effusions with bibasilar opacities, likely atelectasis however potential underlying infection in proper clinical context cannot be excluded.    SWALLOW HISTORY: No reports in SCM or in PACS prior to this admission.

## 2023-03-19 LAB
ANION GAP SERPL CALC-SCNC: 11 MMOL/L — SIGNIFICANT CHANGE UP (ref 5–17)
BUN SERPL-MCNC: 13 MG/DL — SIGNIFICANT CHANGE UP (ref 7–23)
CALCIUM SERPL-MCNC: 8.8 MG/DL — SIGNIFICANT CHANGE UP (ref 8.4–10.5)
CHLORIDE SERPL-SCNC: 98 MMOL/L — SIGNIFICANT CHANGE UP (ref 96–108)
CO2 SERPL-SCNC: 25 MMOL/L — SIGNIFICANT CHANGE UP (ref 22–31)
CREAT SERPL-MCNC: 0.5 MG/DL — SIGNIFICANT CHANGE UP (ref 0.5–1.3)
EGFR: 86 ML/MIN/1.73M2 — SIGNIFICANT CHANGE UP
GLUCOSE SERPL-MCNC: 130 MG/DL — HIGH (ref 70–99)
HCT VFR BLD CALC: 36.7 % — SIGNIFICANT CHANGE UP (ref 34.5–45)
HGB BLD-MCNC: 11.8 G/DL — SIGNIFICANT CHANGE UP (ref 11.5–15.5)
MCHC RBC-ENTMCNC: 27.1 PG — SIGNIFICANT CHANGE UP (ref 27–34)
MCHC RBC-ENTMCNC: 32.2 GM/DL — SIGNIFICANT CHANGE UP (ref 32–36)
MCV RBC AUTO: 84.2 FL — SIGNIFICANT CHANGE UP (ref 80–100)
NRBC # BLD: 0 /100 WBCS — SIGNIFICANT CHANGE UP (ref 0–0)
PLATELET # BLD AUTO: 264 K/UL — SIGNIFICANT CHANGE UP (ref 150–400)
POTASSIUM SERPL-MCNC: 2.8 MMOL/L — CRITICAL LOW (ref 3.5–5.3)
POTASSIUM SERPL-SCNC: 2.8 MMOL/L — CRITICAL LOW (ref 3.5–5.3)
RBC # BLD: 4.36 M/UL — SIGNIFICANT CHANGE UP (ref 3.8–5.2)
RBC # FLD: 14.3 % — SIGNIFICANT CHANGE UP (ref 10.3–14.5)
SODIUM SERPL-SCNC: 134 MMOL/L — LOW (ref 135–145)
WBC # BLD: 20.7 K/UL — HIGH (ref 3.8–10.5)
WBC # FLD AUTO: 20.7 K/UL — HIGH (ref 3.8–10.5)

## 2023-03-19 PROCEDURE — 99232 SBSQ HOSP IP/OBS MODERATE 35: CPT

## 2023-03-19 RX ORDER — PANTOPRAZOLE SODIUM 20 MG/1
40 TABLET, DELAYED RELEASE ORAL
Refills: 0 | Status: DISCONTINUED | OUTPATIENT
Start: 2023-03-20 | End: 2023-03-23

## 2023-03-19 RX ORDER — ERTAPENEM SODIUM 1 G/1
1000 INJECTION, POWDER, LYOPHILIZED, FOR SOLUTION INTRAMUSCULAR; INTRAVENOUS ONCE
Refills: 0 | Status: COMPLETED | OUTPATIENT
Start: 2023-03-19 | End: 2023-03-19

## 2023-03-19 RX ORDER — ERTAPENEM SODIUM 1 G/1
INJECTION, POWDER, LYOPHILIZED, FOR SOLUTION INTRAMUSCULAR; INTRAVENOUS
Refills: 0 | Status: DISCONTINUED | OUTPATIENT
Start: 2023-03-19 | End: 2023-03-21

## 2023-03-19 RX ORDER — ERTAPENEM SODIUM 1 G/1
1000 INJECTION, POWDER, LYOPHILIZED, FOR SOLUTION INTRAMUSCULAR; INTRAVENOUS EVERY 24 HOURS
Refills: 0 | Status: DISCONTINUED | OUTPATIENT
Start: 2023-03-20 | End: 2023-03-21

## 2023-03-19 RX ORDER — ACETAMINOPHEN 500 MG
650 TABLET ORAL EVERY 6 HOURS
Refills: 0 | Status: DISCONTINUED | OUTPATIENT
Start: 2023-03-19 | End: 2023-03-23

## 2023-03-19 RX ORDER — POTASSIUM CHLORIDE 20 MEQ
40 PACKET (EA) ORAL EVERY 4 HOURS
Refills: 0 | Status: COMPLETED | OUTPATIENT
Start: 2023-03-19 | End: 2023-03-19

## 2023-03-19 RX ORDER — FUROSEMIDE 40 MG
20 TABLET ORAL
Refills: 0 | Status: DISCONTINUED | OUTPATIENT
Start: 2023-03-19 | End: 2023-03-20

## 2023-03-19 RX ADMIN — Medication 20 MILLIGRAM(S): at 05:30

## 2023-03-19 RX ADMIN — PIPERACILLIN AND TAZOBACTAM 25 GRAM(S): 4; .5 INJECTION, POWDER, LYOPHILIZED, FOR SOLUTION INTRAVENOUS at 05:29

## 2023-03-19 RX ADMIN — Medication 250 MILLIGRAM(S): at 05:29

## 2023-03-19 RX ADMIN — Medication 81 MILLIGRAM(S): at 11:57

## 2023-03-19 RX ADMIN — SERTRALINE 100 MILLIGRAM(S): 25 TABLET, FILM COATED ORAL at 11:57

## 2023-03-19 RX ADMIN — PANTOPRAZOLE SODIUM 40 MILLIGRAM(S): 20 TABLET, DELAYED RELEASE ORAL at 11:57

## 2023-03-19 RX ADMIN — Medication 40 MILLIEQUIVALENT(S): at 13:46

## 2023-03-19 RX ADMIN — Medication 20 MILLIGRAM(S): at 13:45

## 2023-03-19 RX ADMIN — ERTAPENEM SODIUM 120 MILLIGRAM(S): 1 INJECTION, POWDER, LYOPHILIZED, FOR SOLUTION INTRAMUSCULAR; INTRAVENOUS at 13:46

## 2023-03-19 RX ADMIN — Medication 50 MILLIGRAM(S): at 05:30

## 2023-03-19 RX ADMIN — ENOXAPARIN SODIUM 40 MILLIGRAM(S): 100 INJECTION SUBCUTANEOUS at 05:32

## 2023-03-19 RX ADMIN — ATORVASTATIN CALCIUM 10 MILLIGRAM(S): 80 TABLET, FILM COATED ORAL at 21:54

## 2023-03-19 RX ADMIN — Medication 40 MILLIEQUIVALENT(S): at 17:36

## 2023-03-19 RX ADMIN — LOSARTAN POTASSIUM 100 MILLIGRAM(S): 100 TABLET, FILM COATED ORAL at 05:31

## 2023-03-19 NOTE — PROGRESS NOTE ADULT - PROBLEM SELECTOR PLAN 3
- much improved  - likely metabolic encephalopathy from active infection   -maintain fall, seizure, aspiration precautions; keep head end of bed elevated  -delirium precautions

## 2023-03-19 NOTE — PROGRESS NOTE ADULT - SUBJECTIVE AND OBJECTIVE BOX
Patient is a 89y old  Female who presents with a chief complaint of urinary tract infection, lethargy, acute hypoxic respiratory failure (19 Mar 2023 09:14)      SUBJECTIVE / OVERNIGHT EVENTS:  Pt seen and examined. Spiked fever 101F last night. Pt denies cough/sob/cp/abd pain.    MEDICATIONS  (STANDING):  aspirin enteric coated 81 milliGRAM(s) Oral daily  atorvastatin 10 milliGRAM(s) Oral at bedtime  enoxaparin Injectable 40 milliGRAM(s) SubCutaneous every 24 hours  ertapenem  IVPB      furosemide   Injectable 20 milliGRAM(s) IV Push two times a day  losartan 100 milliGRAM(s) Oral daily  metoprolol succinate ER 50 milliGRAM(s) Oral daily  pantoprazole  Injectable 40 milliGRAM(s) IV Push daily  potassium chloride    Tablet ER 40 milliEquivalent(s) Oral every 4 hours  sertraline 100 milliGRAM(s) Oral daily    MEDICATIONS  (PRN):      Vital Signs Last 24 Hrs  T(C): 36.9 (19 Mar 2023 15:13), Max: 38.4 (18 Mar 2023 21:30)  T(F): 98.4 (19 Mar 2023 15:13), Max: 101.1 (18 Mar 2023 21:30)  HR: 85 (19 Mar 2023 15:13) (76 - 89)  BP: 112/72 (19 Mar 2023 15:13) (112/72 - 168/80)  BP(mean): 109 (19 Mar 2023 13:53) (109 - 109)  RR: 18 (19 Mar 2023 15:13) (18 - 19)  SpO2: 97% (19 Mar 2023 15:13) (94% - 97%)    Parameters below as of 19 Mar 2023 15:13  Patient On (Oxygen Delivery Method): nasal cannula  O2 Flow (L/min): 3    CAPILLARY BLOOD GLUCOSE        I&O's Summary    18 Mar 2023 07:01  -  19 Mar 2023 07:00  --------------------------------------------------------  IN: 890 mL / OUT: 150 mL / NET: 740 mL    19 Mar 2023 07:01  -  19 Mar 2023 16:23  --------------------------------------------------------  IN: 0 mL / OUT: 520 mL / NET: -520 mL        PHYSICAL EXAM:  GENERAL: NAD, frail, [place]  HEAD:  Atraumatic, Normocephalic  EYES: conjunctiva and sclera clear  NECK: Supple, No JVD  CHEST/LUNG: Clear to auscultation bilaterally; No wheeze  HEART: Regular rate and rhythm; No murmurs, rubs, or gallops  ABDOMEN: Soft, Nontender, Nondistended; Bowel sounds present  EXTREMITIES:  2+ Peripheral Pulses, No clubbing, cyanosis, or edema  PSYCH: AAOx2  NEUROLOGY: non-focal  SKIN: No rashes or lesions    LABS:                        11.8   20.70 )-----------( 264      ( 19 Mar 2023 09:25 )             36.7     03-19    134<L>  |  98  |  13  ----------------------------<  130<H>  2.8<LL>   |  25  |  0.50    Ca    8.8      19 Mar 2023 09:25  Phos  3.2     03-18  Mg     1.9     03-18    TPro  5.4<L>  /  Alb  2.9<L>  /  TBili  0.6  /  DBili  x   /  AST  10  /  ALT  10  /  AlkPhos  157<H>  03-18    PT/INR - ( 17 Mar 2023 21:08 )   PT: 18.1 sec;   INR: 1.55 ratio         PTT - ( 17 Mar 2023 21:08 )  PTT:34.4 sec      Urinalysis Basic - ( 17 Mar 2023 23:27 )    Color: Dark Yellow / Appearance: Slightly Turbid / S.033 / pH: x  Gluc: x / Ketone: Negative  / Bili: Negative / Urobili: 2 mg/dL   Blood: x / Protein: 30 mg/dL / Nitrite: Negative   Leuk Esterase: Large / RBC: 4 /hpf / WBC 34 /HPF   Sq Epi: x / Non Sq Epi: 0 /hpf / Bacteria: Many        RADIOLOGY & ADDITIONAL TESTS:    Imaging Personally Reviewed:  CT CHEST 3/18  Stable lung nodules and thoracic lymphadenopathy since 2023.    Increased small bilateral pleural effusions with associated passive   atelectasis since 2023.      Consultant(s) Notes Reviewed:  SLP

## 2023-03-20 LAB
-  AMIKACIN: SIGNIFICANT CHANGE UP
-  AMOXICILLIN/CLAVULANIC ACID: SIGNIFICANT CHANGE UP
-  AMPICILLIN/SULBACTAM: SIGNIFICANT CHANGE UP
-  AMPICILLIN: SIGNIFICANT CHANGE UP
-  AZTREONAM: SIGNIFICANT CHANGE UP
-  CEFAZOLIN: SIGNIFICANT CHANGE UP
-  CEFEPIME: SIGNIFICANT CHANGE UP
-  CEFOXITIN: SIGNIFICANT CHANGE UP
-  CEFTRIAXONE: SIGNIFICANT CHANGE UP
-  CEFUROXIME: SIGNIFICANT CHANGE UP
-  CIPROFLOXACIN: SIGNIFICANT CHANGE UP
-  ERTAPENEM: SIGNIFICANT CHANGE UP
-  GENTAMICIN: SIGNIFICANT CHANGE UP
-  IMIPENEM: SIGNIFICANT CHANGE UP
-  LEVOFLOXACIN: SIGNIFICANT CHANGE UP
-  MEROPENEM: SIGNIFICANT CHANGE UP
-  NITROFURANTOIN: SIGNIFICANT CHANGE UP
-  PIPERACILLIN/TAZOBACTAM: SIGNIFICANT CHANGE UP
-  TOBRAMYCIN: SIGNIFICANT CHANGE UP
-  TRIMETHOPRIM/SULFAMETHOXAZOLE: SIGNIFICANT CHANGE UP
ANION GAP SERPL CALC-SCNC: 11 MMOL/L — SIGNIFICANT CHANGE UP (ref 5–17)
BUN SERPL-MCNC: 14 MG/DL — SIGNIFICANT CHANGE UP (ref 7–23)
CALCIUM SERPL-MCNC: 9.5 MG/DL — SIGNIFICANT CHANGE UP (ref 8.4–10.5)
CHLORIDE SERPL-SCNC: 98 MMOL/L — SIGNIFICANT CHANGE UP (ref 96–108)
CO2 SERPL-SCNC: 24 MMOL/L — SIGNIFICANT CHANGE UP (ref 22–31)
CREAT SERPL-MCNC: 0.54 MG/DL — SIGNIFICANT CHANGE UP (ref 0.5–1.3)
CULTURE RESULTS: SIGNIFICANT CHANGE UP
EGFR: 84 ML/MIN/1.73M2 — SIGNIFICANT CHANGE UP
GLUCOSE SERPL-MCNC: 102 MG/DL — HIGH (ref 70–99)
HCT VFR BLD CALC: 37.6 % — SIGNIFICANT CHANGE UP (ref 34.5–45)
HGB BLD-MCNC: 12 G/DL — SIGNIFICANT CHANGE UP (ref 11.5–15.5)
MCHC RBC-ENTMCNC: 27.2 PG — SIGNIFICANT CHANGE UP (ref 27–34)
MCHC RBC-ENTMCNC: 31.9 GM/DL — LOW (ref 32–36)
MCV RBC AUTO: 85.3 FL — SIGNIFICANT CHANGE UP (ref 80–100)
METHOD TYPE: SIGNIFICANT CHANGE UP
NRBC # BLD: 0 /100 WBCS — SIGNIFICANT CHANGE UP (ref 0–0)
ORGANISM # SPEC MICROSCOPIC CNT: SIGNIFICANT CHANGE UP
ORGANISM # SPEC MICROSCOPIC CNT: SIGNIFICANT CHANGE UP
PLATELET # BLD AUTO: 292 K/UL — SIGNIFICANT CHANGE UP (ref 150–400)
POTASSIUM SERPL-MCNC: 3.7 MMOL/L — SIGNIFICANT CHANGE UP (ref 3.5–5.3)
POTASSIUM SERPL-SCNC: 3.7 MMOL/L — SIGNIFICANT CHANGE UP (ref 3.5–5.3)
RBC # BLD: 4.41 M/UL — SIGNIFICANT CHANGE UP (ref 3.8–5.2)
RBC # FLD: 14.3 % — SIGNIFICANT CHANGE UP (ref 10.3–14.5)
SODIUM SERPL-SCNC: 133 MMOL/L — LOW (ref 135–145)
SPECIMEN SOURCE: SIGNIFICANT CHANGE UP
WBC # BLD: 17.52 K/UL — HIGH (ref 3.8–10.5)
WBC # FLD AUTO: 17.52 K/UL — HIGH (ref 3.8–10.5)

## 2023-03-20 PROCEDURE — 99233 SBSQ HOSP IP/OBS HIGH 50: CPT

## 2023-03-20 PROCEDURE — 93306 TTE W/DOPPLER COMPLETE: CPT | Mod: 26

## 2023-03-20 RX ORDER — POTASSIUM CHLORIDE 20 MEQ
40 PACKET (EA) ORAL ONCE
Refills: 0 | Status: COMPLETED | OUTPATIENT
Start: 2023-03-20 | End: 2023-03-20

## 2023-03-20 RX ORDER — SODIUM CHLORIDE 9 MG/ML
1 INJECTION INTRAMUSCULAR; INTRAVENOUS; SUBCUTANEOUS ONCE
Refills: 0 | Status: COMPLETED | OUTPATIENT
Start: 2023-03-20 | End: 2023-03-20

## 2023-03-20 RX ADMIN — Medication 81 MILLIGRAM(S): at 11:16

## 2023-03-20 RX ADMIN — Medication 20 MILLIGRAM(S): at 13:12

## 2023-03-20 RX ADMIN — Medication 50 MILLIGRAM(S): at 05:32

## 2023-03-20 RX ADMIN — PANTOPRAZOLE SODIUM 40 MILLIGRAM(S): 20 TABLET, DELAYED RELEASE ORAL at 05:42

## 2023-03-20 RX ADMIN — SERTRALINE 100 MILLIGRAM(S): 25 TABLET, FILM COATED ORAL at 11:16

## 2023-03-20 RX ADMIN — LOSARTAN POTASSIUM 100 MILLIGRAM(S): 100 TABLET, FILM COATED ORAL at 05:32

## 2023-03-20 RX ADMIN — Medication 40 MILLIEQUIVALENT(S): at 11:16

## 2023-03-20 RX ADMIN — ENOXAPARIN SODIUM 40 MILLIGRAM(S): 100 INJECTION SUBCUTANEOUS at 05:32

## 2023-03-20 RX ADMIN — SODIUM CHLORIDE 1 GRAM(S): 9 INJECTION INTRAMUSCULAR; INTRAVENOUS; SUBCUTANEOUS at 21:47

## 2023-03-20 RX ADMIN — ERTAPENEM SODIUM 120 MILLIGRAM(S): 1 INJECTION, POWDER, LYOPHILIZED, FOR SOLUTION INTRAMUSCULAR; INTRAVENOUS at 13:11

## 2023-03-20 RX ADMIN — Medication 20 MILLIGRAM(S): at 05:31

## 2023-03-20 RX ADMIN — ATORVASTATIN CALCIUM 10 MILLIGRAM(S): 80 TABLET, FILM COATED ORAL at 21:47

## 2023-03-20 NOTE — PROGRESS NOTE ADULT - PROBLEM SELECTOR PLAN 1
- grossly positive U/A w/ 101 fever at home and significant leukocytosis concerning for UTI  - blood cultures NGTD  - Urine cx with E. coli ; f/up sensitivities  - spiked fever again on 3/18  - CT : Stable lung nodules and thoracic lymphadenopathy since 2/17/2023. Increased small bilateral pleural effusions with associated passive atelectasis since 2/17/2023.results discussed with patient's daughter on the phone 3/20 including finding of pulm nodules and thyroid calcifications.     - s/p Vanc/Zosyn ; leukocytosis improving   - started Ertapenem ; can de-escalate based on sensitivities  - c/w Tylenol PRN for fever
-grossly positive U/A w/ 101 fever at home and significant leukocytosis concerning for UTI  -f/u urine and blood cultures   -c/w broad spectrum Vanc/Zosyn  -no history of resistant organism in our records   -Ofirmev PRN for fever
-grossly positive U/A w/ 101 fever at home and significant leukocytosis concerning for UTI  -blood cultures NGTD  - Urine cx with E. coli ; f/up sensitivities  - spiked fever again on 3/18  - currently on Vanc/Zosyn ; will d/c  - start Ertapenem ; can de-escalate based on sensitivities  - c/w Tylenol PRN for fever

## 2023-03-20 NOTE — PROGRESS NOTE ADULT - PROBLEM SELECTOR PLAN 4
-c/w  home metoprolol and losartan  - c/t hold home amlodipine, resume as tolerated
-c/w  home metoprolol and losartan  - c/t hold home amlodipine, resume as tolerated
- c/w home meds metoprolol and losartan  - HOLD amlodipine, resume as tolerated

## 2023-03-20 NOTE — PROGRESS NOTE ADULT - PROBLEM SELECTOR PLAN 2
-pt CXR with small bilateral pleural effusions and atelectasis similar to prior imaging from 2/2023  -last echo 2018 with mild diastolic dysfunction and mildly elevated BNP on presentation  -low suspicion of ACS, troponin negative x2, EKG sinus without ischemic changes   -known pulmonary nodules on prior imaging   - CT chest done to r/o  superimposed aspiration PNA ; no infilterate ; does show increased bilateral pleural effusions  - increase lasix to 20mg IV bid   - obtain TTE  - wean off O2 as tolerated  - SLP reccs appreciated ; start Regular diet + thin liquids  - aspiration precautions
-pt CXR with small bilateral pleural effusions and atelectasis similar to prior imaging from 2/2023  -last echo 2018 with mild diastolic dysfunction and mildly elevated BNP on presentation  -low suspicion of ACS, troponin negative x2, EKG sinus without ischemic changes   -known pulmonary nodules on prior imaging   - CT chest done to r/o  superimposed aspiration PNA ; no infilterate ; does show increased bilateral pleural effusions    - On IV Lasix, would hold off on further Lasix today and re-asses tomorrow. Supplement K.   - F/U TTE  - On NC1 L,  wean off O2 as tolerated  - SLP reccs appreciated ; start Regular diet + thin liquids  - aspiration precautions  - Tried calling patient's cardiologist Dr. Mishra for consult, but patient not under their service, will need to clarigfy again with patient's daughter tomorrow and consult cards.
-pt CXR with small bilateral pleural effusions and atelectasis similar to prior imaging from 2/2023  -last echo 2018 with mild diastolic dysfunction and mildly elevated BNP on presentation  -low suspicion of ACS, troponin negative x2, EKG sinus without ischemic changes   -known pulmonary cysts on prior imaging ; also has known RUL lung nodule  -possible superimposed pneumonia ; likely aspiration given h/o coughing/choking with food  - will check CT chest  -weaned from NRB to 1LPM NC in the ED ; now sating 97% on 3L NC  -c/w empiric treatment for pneumonia and UTI as above   - c/w lasix 20mg IV daily for now ; monitor BP closely   - obtain TTE  - obtain SLP eval

## 2023-03-20 NOTE — PROGRESS NOTE ADULT - SUBJECTIVE AND OBJECTIVE BOX
[facility] Division of Hospital Medicine  Adarsh Arellano MD M-F, 8A-5P: MS Teams, Pager: [phone]  Other Times: Ext: [phone], Pager: [phone]      Patient is a 89y old  Female who presents with a chief complaint of urinary tract infection, lethargy, acute hypoxic respiratory failure (19 Mar 2023 09:14)      SUBJECTIVE / OVERNIGHT EVENTS:  Patient was examined    ADDITIONAL REVIEW OF SYSTEMS:    MEDICATIONS  (STANDING):  aspirin enteric coated 81 milliGRAM(s) Oral daily  atorvastatin 10 milliGRAM(s) Oral at bedtime  enoxaparin Injectable 40 milliGRAM(s) SubCutaneous every 24 hours  ertapenem  IVPB      ertapenem  IVPB 1000 milliGRAM(s) IV Intermittent every 24 hours  furosemide   Injectable 20 milliGRAM(s) IV Push two times a day  losartan 100 milliGRAM(s) Oral daily  metoprolol succinate ER 50 milliGRAM(s) Oral daily  pantoprazole    Tablet 40 milliGRAM(s) Oral before breakfast  sertraline 100 milliGRAM(s) Oral daily    MEDICATIONS  (PRN):  acetaminophen     Tablet .. 650 milliGRAM(s) Oral every 6 hours PRN Temp greater or equal to 38C (100.4F)      CAPILLARY BLOOD GLUCOSE          I&O's Summary    19 Mar 2023 07:01  -  20 Mar 2023 07:00  --------------------------------------------------------  IN: 400 mL / OUT: 620 mL / NET: -220 mL    20 Mar 2023 07:01  -  20 Mar 2023 18:04  --------------------------------------------------------  IN: 360 mL / OUT: 0 mL / NET: 360 mL        Daily     Daily     PHYSICAL EXAM:  Vital Signs Last 24 Hrs  T(C): 37 (20 Mar 2023 16:33), Max: 37 (20 Mar 2023 00:30)  T(F): 98.6 (20 Mar 2023 16:33), Max: 98.6 (20 Mar 2023 00:30)  HR: 80 (20 Mar 2023 16:33) (80 - 97)  BP: 124/79 (20 Mar 2023 16:33) (122/74 - 164/86)  BP(mean): --  RR: 16 (20 Mar 2023 16:33) (16 - 19)  SpO2: 94% (20 Mar 2023 16:33) (88% - 97%)    Parameters below as of 20 Mar 2023 16:33  Patient On (Oxygen Delivery Method): nasal cannula  O2 Flow (L/min): 1    CONSTITUTIONAL: NAD, well-developed, well-groomed  EYES: PERRLA; conjunctiva and sclera clear  ENMT: Moist oral mucosa, no pharyngeal injection or exudates; normal dentition  NECK: Supple, no palpable masses; no thyromegaly  RESPIRATORY: Normal respiratory effort; lungs are clear to auscultation bilaterally  CARDIOVASCULAR: Regular rate and rhythm, normal S1 and S2, no murmur/rub/gallop; No lower extremity edema; Peripheral pulses are 2+ bilaterally  ABDOMEN: Nontender to palpation, normoactive bowel sounds, no rebound/guarding; No hepatosplenomegaly  MUSCULOSKELETAL:  no clubbing or cyanosis of digits; no joint swelling or tenderness to palpation, moving all extremities   PSYCH: A+O to person, place, and time; affect appropriate  NEUROLOGY: CN 2-12 are intact and symmetric; no gross sensory/motor deficits   SKIN: No rashes; no palpable lesions    LABS:                        12.0   17.52 )-----------( 292      ( 20 Mar 2023 10:31 )             37.6     03-20    133<L>  |  98  |  14  ----------------------------<  102<H>  3.7   |  24  |  0.54    Ca    9.5      20 Mar 2023 06:31                  Culture - Urine (collected 17 Mar 2023 23:27)  Source: Clean Catch Clean Catch (Midstream)  Final Report (20 Mar 2023 15:52):    >100,000 CFU/ml Escherichia coli  Organism: Escherichia coli (20 Mar 2023 15:52)  Organism: Escherichia coli (20 Mar 2023 15:52)    Culture - Blood (collected 17 Mar 2023 21:00)  Source: .Blood Blood-Peripheral  Preliminary Report (19 Mar 2023 02:02):    No growth to date.    Culture - Blood (collected 17 Mar 2023 20:45)  Source: .Blood Blood-Peripheral  Preliminary Report (19 Mar 2023 02:02):    No growth to date.      SARS-CoV-2: NotDetec (17 Mar 2023 21:08)  SARS-CoV-2: NotDetec (17 Feb 2023 22:42)      RADIOLOGY & ADDITIONAL TESTS:  Results Reviewed:   Imaging Personally Reviewed:  Electrocardiogram Personally Reviewed:    COORDINATION OF CARE:  Care Discussed with Consultants/Other Providers [Y/N]:  Prior or Outpatient Records Reviewed [Y/N]:

## 2023-03-20 NOTE — PROGRESS NOTE ADULT - PROBLEM SELECTOR PLAN 6
dvt ppx: lovenox  ambulate: with assistance ; PT eval  diet: pureed, SLP eval   gi ppx: omeprazole --> pantoprazole IV
dvt ppx: lovenox  ambulate: with assistance ; PT eval in progress  diet: regular  gi ppx: omeprazole --> pantoprazole
dvt ppx: lovenox  ambulate: with assistance ; PT eval in progress  diet: regular  gi ppx: omeprazole --> pantoprazole    3/20: patient's daughter updated on the phone on above plan

## 2023-03-20 NOTE — PROGRESS NOTE ADULT - PROBLEM SELECTOR PLAN 3
- likely metabolic encephalopathy from active infection   - maintain fall, seizure, aspiration precautions; keep head end of bed elevated  - delirium precautions  - Treatment of infection , cards w/u as above  - TSH wnl - likely metabolic encephalopathy from active infection   - maintain fall, seizure, aspiration precautions; keep head end of bed elevated  - delirium precautions  - Treatment of infection , cards w/u as above  - TSH wnl  - Patient with poor po intake. 1:1 assist with meals. Nutritionist pebbles.

## 2023-03-21 LAB
ANION GAP SERPL CALC-SCNC: 11 MMOL/L — SIGNIFICANT CHANGE UP (ref 5–17)
BUN SERPL-MCNC: 16 MG/DL — SIGNIFICANT CHANGE UP (ref 7–23)
CALCIUM SERPL-MCNC: 9.5 MG/DL — SIGNIFICANT CHANGE UP (ref 8.4–10.5)
CHLORIDE SERPL-SCNC: 100 MMOL/L — SIGNIFICANT CHANGE UP (ref 96–108)
CO2 SERPL-SCNC: 24 MMOL/L — SIGNIFICANT CHANGE UP (ref 22–31)
CREAT SERPL-MCNC: 0.41 MG/DL — LOW (ref 0.5–1.3)
EGFR: 90 ML/MIN/1.73M2 — SIGNIFICANT CHANGE UP
GLUCOSE SERPL-MCNC: 94 MG/DL — SIGNIFICANT CHANGE UP (ref 70–99)
HCT VFR BLD CALC: 36.3 % — SIGNIFICANT CHANGE UP (ref 34.5–45)
HGB BLD-MCNC: 11.4 G/DL — LOW (ref 11.5–15.5)
MCHC RBC-ENTMCNC: 26.9 PG — LOW (ref 27–34)
MCHC RBC-ENTMCNC: 31.4 GM/DL — LOW (ref 32–36)
MCV RBC AUTO: 85.6 FL — SIGNIFICANT CHANGE UP (ref 80–100)
NRBC # BLD: 0 /100 WBCS — SIGNIFICANT CHANGE UP (ref 0–0)
PLATELET # BLD AUTO: 291 K/UL — SIGNIFICANT CHANGE UP (ref 150–400)
POTASSIUM SERPL-MCNC: 3.9 MMOL/L — SIGNIFICANT CHANGE UP (ref 3.5–5.3)
POTASSIUM SERPL-SCNC: 3.9 MMOL/L — SIGNIFICANT CHANGE UP (ref 3.5–5.3)
RBC # BLD: 4.24 M/UL — SIGNIFICANT CHANGE UP (ref 3.8–5.2)
RBC # FLD: 14.4 % — SIGNIFICANT CHANGE UP (ref 10.3–14.5)
SODIUM SERPL-SCNC: 135 MMOL/L — SIGNIFICANT CHANGE UP (ref 135–145)
WBC # BLD: 12.49 K/UL — HIGH (ref 3.8–10.5)
WBC # FLD AUTO: 12.49 K/UL — HIGH (ref 3.8–10.5)

## 2023-03-21 RX ORDER — IPRATROPIUM/ALBUTEROL SULFATE 18-103MCG
3 AEROSOL WITH ADAPTER (GRAM) INHALATION EVERY 6 HOURS
Refills: 0 | Status: DISCONTINUED | OUTPATIENT
Start: 2023-03-21 | End: 2023-03-23

## 2023-03-21 RX ORDER — CEFTRIAXONE 500 MG/1
1000 INJECTION, POWDER, FOR SOLUTION INTRAMUSCULAR; INTRAVENOUS EVERY 24 HOURS
Refills: 0 | Status: DISCONTINUED | OUTPATIENT
Start: 2023-03-21 | End: 2023-03-23

## 2023-03-21 RX ORDER — FUROSEMIDE 40 MG
20 TABLET ORAL DAILY
Refills: 0 | Status: DISCONTINUED | OUTPATIENT
Start: 2023-03-21 | End: 2023-03-23

## 2023-03-21 RX ADMIN — Medication 81 MILLIGRAM(S): at 12:12

## 2023-03-21 RX ADMIN — CEFTRIAXONE 100 MILLIGRAM(S): 500 INJECTION, POWDER, FOR SOLUTION INTRAMUSCULAR; INTRAVENOUS at 12:12

## 2023-03-21 RX ADMIN — SERTRALINE 100 MILLIGRAM(S): 25 TABLET, FILM COATED ORAL at 12:12

## 2023-03-21 RX ADMIN — PANTOPRAZOLE SODIUM 40 MILLIGRAM(S): 20 TABLET, DELAYED RELEASE ORAL at 05:08

## 2023-03-21 RX ADMIN — ATORVASTATIN CALCIUM 10 MILLIGRAM(S): 80 TABLET, FILM COATED ORAL at 21:20

## 2023-03-21 RX ADMIN — ENOXAPARIN SODIUM 40 MILLIGRAM(S): 100 INJECTION SUBCUTANEOUS at 05:08

## 2023-03-21 RX ADMIN — Medication 50 MILLIGRAM(S): at 05:08

## 2023-03-21 RX ADMIN — LOSARTAN POTASSIUM 100 MILLIGRAM(S): 100 TABLET, FILM COATED ORAL at 05:08

## 2023-03-21 NOTE — PROGRESS NOTE ADULT - SUBJECTIVE AND OBJECTIVE BOX
Patient is a 89y old  Female who presents with a chief complaint of urinary tract infection, lethargy, acute hypoxic respiratory failure (21 Mar 2023 09:28)    asked nto assume care  SUBJECTIVE / OVERNIGHT EVENTS: Comfortable HHA at bedside.   Review of Systems  chest pain no  palpitations no  sob no  nausea no  headache no    MEDICATIONS  (STANDING):  aspirin enteric coated 81 milliGRAM(s) Oral daily  atorvastatin 10 milliGRAM(s) Oral at bedtime  cefTRIAXone   IVPB 1000 milliGRAM(s) IV Intermittent every 24 hours  enoxaparin Injectable 40 milliGRAM(s) SubCutaneous every 24 hours  losartan 100 milliGRAM(s) Oral daily  metoprolol succinate ER 50 milliGRAM(s) Oral daily  pantoprazole    Tablet 40 milliGRAM(s) Oral before breakfast  sertraline 100 milliGRAM(s) Oral daily    MEDICATIONS  (PRN):  acetaminophen     Tablet .. 650 milliGRAM(s) Oral every 6 hours PRN Temp greater or equal to 38C (100.4F)      Vital Signs Last 24 Hrs  T(C): 36.9 (21 Mar 2023 16:08), Max: 37.2 (21 Mar 2023 00:11)  T(F): 98.5 (21 Mar 2023 16:08), Max: 99 (21 Mar 2023 00:11)  HR: 86 (21 Mar 2023 16:08) (74 - 86)  BP: 142/84 (21 Mar 2023 16:08) (138/81 - 145/84)  BP(mean): --  RR: 18 (21 Mar 2023 16:08) (17 - 18)  SpO2: 92% (21 Mar 2023 16:08) (92% - 95%)    Parameters below as of 21 Mar 2023 16:08  Patient On (Oxygen Delivery Method): room air        PHYSICAL EXAM:  GENERAL: NAD  HEAD:  Atraumatic, Normocephalic  EYES: EOMI, PERRLA, conjunctiva and sclera clear  NECK: Supple, No JVD  CHEST/LUNG: Clear to auscultation bilaterally; No wheeze  HEART: Regular rate and rhythm; No murmurs, rubs, or gallops  ABDOMEN: Soft, Nontender, Nondistended; Bowel sounds present  EXTREMITIES:  2+ Peripheral Pulses, No clubbing, cyanosis, or edema  PSYCH: AAOx3  NEUROLOGY: non-focal  SKIN: No rashes or lesions    LABS:                        11.4   12.49 )-----------( 291      ( 21 Mar 2023 06:53 )             36.3     03-21    135  |  100  |  16  ----------------------------<  94  3.9   |  24  |  0.41<L>    Ca    9.5      21 Mar 2023 06:54                  RADIOLOGY & ADDITIONAL TESTS:    Imaging Personally Reviewed:    Consultant(s) Notes Reviewed:      Care Discussed with Consultants/Other Providers:

## 2023-03-21 NOTE — CONSULT NOTE ADULT - SUBJECTIVE AND OBJECTIVE BOX
HPI:   Patient is a 89y female with a past history of macular degeneration, dementia, HTN, HLD, CVA, coon resection for cancer, and recent 24 hours admission in mid Feb for cough and hypoxia who was brought to the [place] ER on 3/17 with a low grade fever,lethargy, and hypoxia.  Her urine was cloudy and she was started on antibiotics for a presumed  infection. Her fever is improved, her leukocytosis with a wbc over 20,000 has moderated. Her CT scan of chest shows B/L effusions and atelectasis.  She has passed a swallow evaluation.  Her admission in mid Feb was notable for a positive RVP-RV/EV, no fever, normal wbc, and a normal PC.   She was not discharged on any antibiotics. She reportedly had a cough and upper airway congestion post discharge.  Her doses of lasix and amlodipine were adjusted as OPD.She is receiving ertapenem.    REVIEW OF SYSTEMS:  All other review of systems negative (Comprehensive ROS)    PAST MEDICAL & SURGICAL HISTORY:  Gastric ulcer      Colon cancer      HTN (hypertension)      Macular degeneration, unspecified laterality, unspecified type      Bezoar, sequela      History of colon resection      H/O abdominal hysterectomy          Allergies    No Known Allergies    Intolerances        Antimicrobials Day #  :day 5 antibiotics  ertapenem  IVPB      ertapenem  IVPB 1000 milliGRAM(s) IV Intermittent every 24 hours    Other Medications:  acetaminophen     Tablet .. 650 milliGRAM(s) Oral every 6 hours PRN  aspirin enteric coated 81 milliGRAM(s) Oral daily  atorvastatin 10 milliGRAM(s) Oral at bedtime  enoxaparin Injectable 40 milliGRAM(s) SubCutaneous every 24 hours  losartan 100 milliGRAM(s) Oral daily  metoprolol succinate ER 50 milliGRAM(s) Oral daily  pantoprazole    Tablet 40 milliGRAM(s) Oral before breakfast  sertraline 100 milliGRAM(s) Oral daily      FAMILY HISTORY: N/C      SOCIAL HISTORY:  Smoking:  x   ETOH:x     Drug Use: x       T(F): 99 (03-21-23 @ 00:11), Max: 99 (03-21-23 @ 00:11)  HR: 82 (03-21-23 @ 05:05)  BP: 138/81 (03-21-23 @ 05:05)  RR: 17 (03-21-23 @ 00:11)  SpO2: 94% (03-21-23 @ 05:05)  Wt(kg): --    PHYSICAL EXAM:  General: alert, no acute distress  Eyes:  anicteric, no conjunctival injection, no discharge  Oropharynx: no lesions or injection 	  Neck: supple, without adenopathy  Lungs: clear to auscultation, decreased at bases  Heart: regular rate and rhythm; distant tones  Abdomen: soft, nondistended, nontender, without mass or organomegaly  Skin: no lesions  Extremities: no clubbing, cyanosis, or edema  Neurologic: alert, oriented, moves all extremities, cooperative    LAB RESULTS:                        11.4   12.49 )-----------( 291      ( 21 Mar 2023 06:53 )             36.3     03-21    135  |  100  |  16  ----------------------------<  94  3.9   |  24  |  0.41<L>    Ca    9.5      21 Mar 2023 06:54            MICROBIOLOGY:  RECENT CULTURES:  03-17 @ 23:27 Clean Catch Clean Catch (Midstream) Escherichia coli    >100,000 CFU/ml Escherichia coli      03-17 @ 21:00 .Blood Blood-Peripheral     No growth to date.      03-17 @ 20:45 .Blood Blood-Peripheral     No growth to date.            RADIOLOGY REVIEWED:  < from: CT Chest No Cont (03.18.23 @ 15:02) >  IMPRESSION:    Stable lung nodules and thoracic lymphadenopathy since 2/17/2023.    Increased small bilateral pleural effusions with associated passive   atelectasis since 2/17/2023.    --- End of Report ---    < end of copied text >  < from: CT Chest w/ IV Cont (02.17.23 @ 23:38) >  IMPRESSION:    A 1.3 cm right upper lobe pulmonary nodule has increased in size when   compared to 12/14/2018. Exact etiology is unclear.    Small bilateral pleural effusions right greater than left.    Trace pericardial effusion.    < end of copied text >

## 2023-03-21 NOTE — CONSULT NOTE ADULT - ASSESSMENT
Patient is a 89y female with a past history of macular degeneration, dementia, HTN, HLD, CVA, coon resection for cancer, and recent 24 hours admission in mid Feb for cough and hypoxia who was brought to the [place] ER on 3/17 with a low grade fever,lethargy, and hypoxia.  Her urine was cloudy and she was started on antibiotics for a presumed  infection. Her fever is improved, her leukocytosis with a wbc over 20,000 has moderated. Her CT scan of chest shows B/L effusions and atelectasis.  She has passed a swallow evaluation.  Her admission in mid Feb was notable for a positive RVP-RV/EV, no fever, normal wbc, and a normal PC.   She was not discharged on any antibiotics. She reportedly had a cough and upper airway congestion post discharge.  Her doses of lasix and amlodipine were adjusted as OPD.She is receiving ertapenem.  She appears clinically stable and her wbc has decreased from 22,000 to 12,000.Her blood cultures remain negative  She apparently is more alert. She has been on appropriate antibiotics targeting the E Coli in the urine x 5 days.  No other infection is obvious,  Suggest:  1. Favor 7-10 days total antibiotics, will narrow coverage to CTX today  2. Can switch  to po ceftin at any point to complete a 10 days course of antibiotics (day 5/10)  3. recent hypoxia does not appear to be related to active pulmonary infection  4.Supportive care , we could consider a renal/bladder ultrasound as part of evaluation but yield may be low on impacting treatment plan.

## 2023-03-22 LAB
ANION GAP SERPL CALC-SCNC: 10 MMOL/L — SIGNIFICANT CHANGE UP (ref 5–17)
BUN SERPL-MCNC: 16 MG/DL — SIGNIFICANT CHANGE UP (ref 7–23)
CALCIUM SERPL-MCNC: 9.5 MG/DL — SIGNIFICANT CHANGE UP (ref 8.4–10.5)
CHLORIDE SERPL-SCNC: 101 MMOL/L — SIGNIFICANT CHANGE UP (ref 96–108)
CO2 SERPL-SCNC: 26 MMOL/L — SIGNIFICANT CHANGE UP (ref 22–31)
CREAT SERPL-MCNC: 0.45 MG/DL — LOW (ref 0.5–1.3)
EGFR: 88 ML/MIN/1.73M2 — SIGNIFICANT CHANGE UP
GLUCOSE SERPL-MCNC: 82 MG/DL — SIGNIFICANT CHANGE UP (ref 70–99)
HCT VFR BLD CALC: 35.2 % — SIGNIFICANT CHANGE UP (ref 34.5–45)
HGB BLD-MCNC: 11.1 G/DL — LOW (ref 11.5–15.5)
MCHC RBC-ENTMCNC: 26.7 PG — LOW (ref 27–34)
MCHC RBC-ENTMCNC: 31.5 GM/DL — LOW (ref 32–36)
MCV RBC AUTO: 84.8 FL — SIGNIFICANT CHANGE UP (ref 80–100)
NRBC # BLD: 0 /100 WBCS — SIGNIFICANT CHANGE UP (ref 0–0)
PLATELET # BLD AUTO: 297 K/UL — SIGNIFICANT CHANGE UP (ref 150–400)
POTASSIUM SERPL-MCNC: 4 MMOL/L — SIGNIFICANT CHANGE UP (ref 3.5–5.3)
POTASSIUM SERPL-SCNC: 4 MMOL/L — SIGNIFICANT CHANGE UP (ref 3.5–5.3)
RBC # BLD: 4.15 M/UL — SIGNIFICANT CHANGE UP (ref 3.8–5.2)
RBC # FLD: 14.1 % — SIGNIFICANT CHANGE UP (ref 10.3–14.5)
SODIUM SERPL-SCNC: 137 MMOL/L — SIGNIFICANT CHANGE UP (ref 135–145)
WBC # BLD: 9.99 K/UL — SIGNIFICANT CHANGE UP (ref 3.8–10.5)
WBC # FLD AUTO: 9.99 K/UL — SIGNIFICANT CHANGE UP (ref 3.8–10.5)

## 2023-03-22 RX ADMIN — Medication 3 MILLILITER(S): at 05:29

## 2023-03-22 RX ADMIN — Medication 20 MILLIGRAM(S): at 05:28

## 2023-03-22 RX ADMIN — PANTOPRAZOLE SODIUM 40 MILLIGRAM(S): 20 TABLET, DELAYED RELEASE ORAL at 05:29

## 2023-03-22 RX ADMIN — ENOXAPARIN SODIUM 40 MILLIGRAM(S): 100 INJECTION SUBCUTANEOUS at 05:28

## 2023-03-22 RX ADMIN — Medication 3 MILLILITER(S): at 11:45

## 2023-03-22 RX ADMIN — SERTRALINE 100 MILLIGRAM(S): 25 TABLET, FILM COATED ORAL at 11:46

## 2023-03-22 RX ADMIN — Medication 50 MILLIGRAM(S): at 05:29

## 2023-03-22 RX ADMIN — LOSARTAN POTASSIUM 100 MILLIGRAM(S): 100 TABLET, FILM COATED ORAL at 05:29

## 2023-03-22 RX ADMIN — Medication 3 MILLILITER(S): at 17:21

## 2023-03-22 RX ADMIN — ATORVASTATIN CALCIUM 10 MILLIGRAM(S): 80 TABLET, FILM COATED ORAL at 21:05

## 2023-03-22 RX ADMIN — Medication 81 MILLIGRAM(S): at 11:46

## 2023-03-22 RX ADMIN — CEFTRIAXONE 100 MILLIGRAM(S): 500 INJECTION, POWDER, FOR SOLUTION INTRAMUSCULAR; INTRAVENOUS at 11:47

## 2023-03-22 NOTE — CONSULT NOTE ADULT - TIME BILLING
Patient seen and examined, agree with the above assessment and plan by LOTUS Treadwell.  89yo F w/ PMHx of [place], macular degeneration, mci/dementia, ?meningiomas, HTN, HLD, CVA, PUD, colon ca s/p resection, bezoar, gastroparesis, presents with lethargy i/s/o UTI.  ECHO with small and moderate pericardial effusion   No evidence of tamponade  cont IV lasix  Abx per ID

## 2023-03-22 NOTE — DIETITIAN INITIAL EVALUATION ADULT - REASON INDICATOR FOR ASSESSMENT
Nutrition consult warranted for: nutrition services assessment and education   Information obtained from: electronic medical record and patient's daughter Tana [phone]. Of note, pt unable to provide subjective information during RD visit.   Chart reviewed, events noted.

## 2023-03-22 NOTE — PROVIDER CONTACT NOTE (OTHER) - ASSESSMENT
See vs flowsheet.
bladder scan done because patient feels very distended  upon assessment. patient is not complaining of pain but does feel pressure when palpating. bladder scan done to check for retention showing >550 ml

## 2023-03-22 NOTE — DIETITIAN INITIAL EVALUATION ADULT - OTHER INFO
Nutrition-related concerns:   - Pt seen by SLP on 3/8 with following recommendations: Regular solids/thin liquids.   - Ordered for Lasix.

## 2023-03-22 NOTE — PROVIDER CONTACT NOTE (OTHER) - ACTION/TREATMENT ORDERED:
LOTUS Byrne notified and aware. straight cath orderd and bladder scan repeat and 12:00. will endorse to day shift RN due to change of shift.
JERONIMO Bran notified. No other interventions at this time . Continue to monitor .

## 2023-03-22 NOTE — DIETITIAN INITIAL EVALUATION ADULT - REASON FOR ADMISSION
Per chart, "89yo F w/ PMHx of [place], macular degeneration, mci/dementia, ?meningiomas, HTN, HLD, CVA, PUD, colon ca s/p resection, bezoar, gastroparesis, presents with lethargy, fever, and acute hypoxic respiratory failure concerning for urinary tract infection vs pneumonia."

## 2023-03-22 NOTE — DIETITIAN INITIAL EVALUATION ADULT - PHYSCIAL ASSESSMENT
Weight Hx Per:  - Source: patient's daughter   - UBW: 130-135 pounds   - Reported weight changes: Pt's daughter suspects weight loss secondary to poor appetite.     Weight Hx Per [facility]:   - 54.4 kg (2/17/2023)    Current Admission Weights:  - Dosing weight: 125 pounds (56.7 kg)    Weight Change:  - Some weight gain noted since 02/2023, likely fluid shifts. 7% weight loss noted (UBW reported vs dosing weight).    **  Will continue to monitor weight trends as available/able.     IBW: 100 pounds   %IBW: 125%

## 2023-03-22 NOTE — PROVIDER CONTACT NOTE (OTHER) - BACKGROUND
Pt admitted today with pneumonia , hypoxia.
pmhx macular degeneration, gastric ulcer, colon ca, hypoxemia

## 2023-03-22 NOTE — CONSULT NOTE ADULT - ASSESSMENT
Echo 3/20/23: Nml LV fxn EF 62%, mild diastolic dysfxn, Small/Moderate pericardial effusion    A/p  89yo F w/ PMHx of [place], macular degeneration, mci/dementia, ?meningiomas, HTN, HLD, CVA, PUD, colon ca s/p resection, bezoar, gastroparesis, presents with lethargy i/s/o UTI.    #Pericardial Effusion  -Area of small and moderate effusion on echo  -No c/f tamponade  -Per my discussion w/ pts daughter, she would like to defer thoracic eval at this time    #AHRF, CHG  -Possibly d/t CHF exacerbation i/s/o uti  -CT chest w/ b/l pleural effusions  -Continue IV lasix 20mg qd  -Will re-eval for diuretic need daily  -Continue metoprolol    #CAD  -Cont ASA  -Cont Statin    #HTN  -Cont bb  -Cont losartan    #UTI  -Abx per ID      d/w patients daughter  Please call/text me with any questions/concerns between 8am-4pm  [phone]

## 2023-03-22 NOTE — CONSULT NOTE ADULT - SUBJECTIVE AND OBJECTIVE BOX
CARDIOLOGY CONSULT - Dr. Hunter         HPI:  89yo F w/ PMHx of [place], macular degeneration, mci/dementia, ?meningiomas, HTN, HLD, CVA, PUD, colon ca s/p resection, bezoar, gastroparesis, presents with lethargy, history obtained from daughters at bedside, they report that the home health aid called them to report that the pt was not feeling well, she vomiting, was more lethargic and had a fever of 101, pt recently admitted to [facility] 2/17 for acute hypoxic respiratory failure secondary to rhino/enterovirus infection and severe hypertension, daughters report that since discharge, she has had an ongoing cough, with clear phlegm production and SOB with occasional wheezing, she has worsening confusion over the last few months, they deny chest pain, changes in stool, urinary symptoms, slurred speech, weakness on one side compared to other, changes in ambulation, in the ED, pt was hypoxic requiring NRB which was then weaned down to 1L NC, labs were notable for leukocytosis, grossly positive U/A, BNP mildly elevated above prior, pt was given Vanc/Zosyn, 1L NS, Ofirmev x1, admitted to general medicine for further management .   (18 Mar 2023 02:15)      PAST MEDICAL & SURGICAL HISTORY:  Gastric ulcer      Colon cancer      HTN (hypertension)      Macular degeneration, unspecified laterality, unspecified type      Bezoar, sequela      History of colon resection      H/O abdominal hysterectomy              PREVIOUS DIAGNOSTIC TESTING:    [x] Echocardiogram:  < from: Transthoracic Echocardiogram (03.20.23 @ 14:05) >  Conclusions:  1. Normal left ventricular internal dimensions and wall  thicknesses.  2. Normal left ventricular systolic function. No segmental  wall motion abnormalities.  3. Normal right ventricular size and function.   A device  wire is noted in the right heart.  4. Small pericardiak effusion posterior to the left  ventricle. Moderate pocket of effusion posterior to the LV  measuring 1.8 cm.    No echocardiographic evidence of  pericardial tamponade.  *** Compared with echocardiogram of 12/18/2018, a  pericardial effusion is noted.    < end of copied text >    [ ]  Catheterization:  [ ] Stress Test:  	    MEDICATIONS:  Home Medications:  furosemide 40 mg oral tablet: 1 tab(s) orally every other day (18 Mar 2023 02:09)  losartan 100 mg oral tablet: 1 tab(s) orally once a day (18 Mar 2023 02:09)  motilium: 1 tab(s) orally 2 times a day (18 Mar 2023 02:09)  omeprazole 20 mg oral delayed release capsule: 1 cap(s) orally once a day (18 Mar 2023 02:09)      MEDICATIONS  (STANDING):  albuterol/ipratropium for Nebulization 3 milliLiter(s) Nebulizer every 6 hours  aspirin enteric coated 81 milliGRAM(s) Oral daily  atorvastatin 10 milliGRAM(s) Oral at bedtime  cefTRIAXone   IVPB 1000 milliGRAM(s) IV Intermittent every 24 hours  enoxaparin Injectable 40 milliGRAM(s) SubCutaneous every 24 hours  furosemide   Injectable 20 milliGRAM(s) IV Push daily  losartan 100 milliGRAM(s) Oral daily  metoprolol succinate ER 50 milliGRAM(s) Oral daily  pantoprazole    Tablet 40 milliGRAM(s) Oral before breakfast  sertraline 100 milliGRAM(s) Oral daily      FAMILY HISTORY:      SOCIAL HISTORY:    [x] Non-smoker  [ ] Smoker  [ ] Alcohol    Allergies    No Known Allergies    Intolerances    	    REVIEW OF SYSTEMS:  CONSTITUTIONAL: No fever, weight loss, or fatigue  EYES: No eye pain, visual disturbances, or discharge  ENMT:  No difficulty hearing, tinnitus, vertigo; No sinus or throat pain  NECK: No pain or stiffness  RESPIRATORY: No cough, wheezing, chills or hemoptysis; No Shortness of Breath  CARDIOVASCULAR: No chest pain, palpitations, passing out, dizziness, or leg swelling  GASTROINTESTINAL: No abdominal or epigastric pain. No nausea, vomiting, or hematemesis; No diarrhea or constipation. No melena or hematochezia.  GENITOURINARY: No dysuria, frequency, hematuria, or incontinence  NEUROLOGICAL: No headaches, memory loss, loss of strength, numbness, or tremors  SKIN: No itching, burning, rashes, or lesions   	    [x] All others negative	  [ ] Unable to obtain    PHYSICAL EXAM:  T(C): 36.8 (03-22-23 @ 08:02), Max: 37.2 (03-22-23 @ 00:03)  HR: 85 (03-22-23 @ 08:02) (85 - 86)  BP: 161/85 (03-22-23 @ 08:02) (142/84 - 167/85)  RR: 18 (03-22-23 @ 08:02) (18 - 18)  SpO2: 94% (03-22-23 @ 08:02) (91% - 94%)  Wt(kg): --  I&O's Summary    21 Mar 2023 07:01  -  22 Mar 2023 07:00  --------------------------------------------------------  IN: 400 mL / OUT: 700 mL / NET: -300 mL    22 Mar 2023 07:01  -  22 Mar 2023 14:24  --------------------------------------------------------  IN: 120 mL / OUT: 600 mL / NET: -480 mL        Appearance: Elderly female	  Psychiatry: A & O x 3, Mood & affect appropriate  HEENT:   Normal oral mucosa, PERRL, EOMI	  Lymphatic: No lymphadenopathy  Cardiovascular: Normal S1 S2,RRR, No JVD, No murmurs  Respiratory: Lungs clear to auscultation b/l	  Gastrointestinal:  Soft, Non-tender, + BS	  Skin: No rashes, No ecchymoses, No cyanosis	  Neurologic: Non-focal  Extremities: Normal range of motion, No clubbing, cyanosis. +B/l LE edema  Vascular: Peripheral pulses palpable 2+ bilaterally    TELEMETRY: 	    ECG:  	  RADIOLOGY:  < from: CT Chest No Cont (03.18.23 @ 15:02) >  IMPRESSION:    Stable lung nodules and thoracic lymphadenopathy since 2/17/2023.    Increased small bilateral pleural effusions with associated passive   atelectasis since 2/17/2023.    --- End of Report ---    < end of copied text >    OTHER: 	  	  LABS:	 	    CARDIAC MARKERS:  Troponin T, High Sensitivity Result: 17 ng/L (03-17 @ 22:10)  Troponin T, High Sensitivity Result: 20 ng/L (03-17 @ 21:08)                                  11.1   9.99  )-----------( 297      ( 22 Mar 2023 06:55 )             35.2     03-22    137  |  101  |  16  ----------------------------<  82  4.0   |  26  |  0.45<L>    Ca    9.5      22 Mar 2023 06:51        proBNP:   Lipid Profile:   HgA1c:   TSH:

## 2023-03-22 NOTE — PROGRESS NOTE ADULT - SUBJECTIVE AND OBJECTIVE BOX
CC: f/u for E Coli UTI    Patient reports: she appears comfortable, no overt complaints, she has required a strait cath for a PVR of 600cc, urine appears clear.    REVIEW OF SYSTEMS:  All other review of systems negative (Comprehensive ROS)    Antimicrobials Day #  :day 6  cefTRIAXone   IVPB 1000 milliGRAM(s) IV Intermittent every 24 hours    Other Medications Reviewed  MEDICATIONS  (STANDING):  albuterol/ipratropium for Nebulization 3 milliLiter(s) Nebulizer every 6 hours  aspirin enteric coated 81 milliGRAM(s) Oral daily  atorvastatin 10 milliGRAM(s) Oral at bedtime  cefTRIAXone   IVPB 1000 milliGRAM(s) IV Intermittent every 24 hours  enoxaparin Injectable 40 milliGRAM(s) SubCutaneous every 24 hours  furosemide   Injectable 20 milliGRAM(s) IV Push daily  losartan 100 milliGRAM(s) Oral daily  metoprolol succinate ER 50 milliGRAM(s) Oral daily  pantoprazole    Tablet 40 milliGRAM(s) Oral before breakfast  sertraline 100 milliGRAM(s) Oral daily    T(F): 98.2 (03-22-23 @ 08:02), Max: 99 (03-22-23 @ 00:03)  HR: 85 (03-22-23 @ 08:02)  BP: 161/85 (03-22-23 @ 08:02)  RR: 18 (03-22-23 @ 08:02)  SpO2: 94% (03-22-23 @ 08:02)  Wt(kg): --    PHYSICAL EXAM:  General: alert, no acute distress  Eyes:  anicteric, no conjunctival injection, no discharge  Oropharynx: no lesions or injection 	  Neck: supple, without adenopathy  Lungs: clear to auscultation  Heart: regular rate and rhythm; no murmur, rubs or gallops  Abdomen: soft, nondistended, nontender, without mass or organomegaly  Skin: no lesions  Extremities: no clubbing, cyanosis, or edema  Neurologic: alert, oriented, moves all extremities. Very hard of hearing.    LAB RESULTS:                        11.1   9.99  )-----------( 297      ( 22 Mar 2023 06:55 )             35.2     03-22    137  |  101  |  16  ----------------------------<  82  4.0   |  26  |  0.45<L>    Ca    9.5      22 Mar 2023 06:51          MICROBIOLOGY:  RECENT CULTURES:  03-17 @ 23:27 Clean Catch Clean Catch (Midstream) Escherichia coli    >100,000 CFU/ml Escherichia coli      03-17 @ 21:00 .Blood Blood-Peripheral     No growth to date.      03-17 @ 20:45 .Blood Blood-Peripheral     No growth to date.          RADIOLOGY REVIEWED:  < from: CT Chest No Cont (03.18.23 @ 15:02) >  IMPRESSION:    Stable lung nodules and thoracic lymphadenopathy since 2/17/2023.    Increased small bilateral pleural effusions with associated passive   atelectasis since 2/17/2023.    --- End of Report ---    < end of copied text >

## 2023-03-22 NOTE — DIETITIAN INITIAL EVALUATION ADULT - PERSON TAUGHT/METHOD
Emphasized the importance of adequate kcal and protein intake, provided recommendations to optimize nutritional intake in case of decreased appetite, recommended small frequent meals by consuming nutrient-dense snacks between meals, to start with protein, and sips of supplement throughout the day./verbal instruction/teach back - (Patient repeats in own words)/daughter instructed

## 2023-03-22 NOTE — CONSULT NOTE ADULT - REASON FOR ADMISSION
urinary tract infection, lethargy, acute hypoxic respiratory failure
urinary tract infection, lethargy, acute hypoxic respiratory failure

## 2023-03-22 NOTE — DIETITIAN INITIAL EVALUATION ADULT - PERTINENT LABORATORY DATA
03-22    137  |  101  |  16  ----------------------------<  82  4.0   |  26  |  0.45<L>    Ca    9.5      22 Mar 2023 06:51    A1C with Estimated Average Glucose Result: 5.6 % (02-18-23 @ 05:23)

## 2023-03-22 NOTE — DIETITIAN INITIAL EVALUATION ADULT - REASON
Nutrition Focused Physical Exam deferred at this time, pt unable to provide consent. Visual findings of muscle/fat wasting noted, appropriate for age.

## 2023-03-22 NOTE — DIETITIAN INITIAL EVALUATION ADULT - NSFNSGIIOFT_GEN_A_CORE
- Pt denies nausea, vomiting, diarrhea, or constipation.   - Last BM: 3/18; not currently ordered for bowel regimen.

## 2023-03-22 NOTE — PROVIDER CONTACT NOTE (OTHER) - SITUATION
oral temp - 101.1 . Pt on iv vanco 750 mg , iv zosyn tid. Iv tylenol 1 gm given as per order.
patient bladder scan is showing volume >550 ml. bladder scan done because patient feels very distended  upon assessment.

## 2023-03-22 NOTE — DIETITIAN INITIAL EVALUATION ADULT - ORAL INTAKE PTA/DIET HISTORY
Pt's daughter reports diminished appetite/PO intake PTA x 1 week. At baseline, pt consumes 3 meals a day.   - NKFA/intolerances reported.   - No therapeutic restrictions reported.  - Micronutrient/Other supplementation: multivitamin and GI motility supplement   - Protein-energy supplementation: Ensure shakes prn   - No hx of chewing/swallowing difficulties.

## 2023-03-22 NOTE — DIETITIAN INITIAL EVALUATION ADULT - NSFNSPHYEXAMSKINFT_GEN_A_CORE
86 y.o. male hx of DLBCL (s/p 6 cycles of R-CHOP and in [facility] until 9/2019), L shoulder mass and pathologic fracture s/p left proximal humerus open biopsy, radical resection of tumor, and ORIF with IMN and cementation on 9/25/20), Left arm DVT, HTN, HLD, CKD, BPH, anemia, syncope, and anxiety/adjustment disorder/depression :    here from rehab facility for tachycardia. Limited information provided by EMS and papers patient sent with. Patient endorsing left shoulder pain and swelling as well as difficulty breathing. Denies chest pain, abdominal pain, nausea, vomiting, diarrhea, leg pain or swelling. States left shoulder swollen, worsening x2 weeks. Not clear if patient on A/C for DVT. Patient given 325mg tylenol at 9am. No pressure injuries per nursing flow sheets

## 2023-03-22 NOTE — PROGRESS NOTE ADULT - NUTRITIONAL ASSESSMENT
This patient has been assessed with a concern for Malnutrition and has been determined to have a diagnosis/diagnoses of Severe protein-calorie malnutrition.    This patient is being managed with:   Diet Regular-  Kosher  Supplement Feeding Modality:  Oral  Ensure Plus High Protein Cans or Servings Per Day:  2       Frequency:  Daily  Entered: Mar 22 2023  5:06PM    Diet Regular-  Kosher  Entered: Mar 19 2023 12:38PM    The following pending diet order is being considered for treatment of Severe protein-calorie malnutrition:null

## 2023-03-22 NOTE — DIETITIAN INITIAL EVALUATION ADULT - ADD RECOMMEND
1) New malnutrition notification sent.  2) Continue current diet order; Kosher, regular diet.   3) Recommend Ensure Plus 2x/day (700 Chandan, 26 Gm protein) (vanilla or chocolate flavor only) to optimize PO intake.   4) Encourage adequate intake of meals and supplements to optimize PO intake.   5) Monitor PO intake/tolerance, weights, labs, hydration status, bowels, and skin integrity.

## 2023-03-22 NOTE — DIETITIAN INITIAL EVALUATION ADULT - PERTINENT MEDS FT
MEDICATIONS  (STANDING):  albuterol/ipratropium for Nebulization 3 milliLiter(s) Nebulizer every 6 hours  aspirin enteric coated 81 milliGRAM(s) Oral daily  atorvastatin 10 milliGRAM(s) Oral at bedtime  cefTRIAXone   IVPB 1000 milliGRAM(s) IV Intermittent every 24 hours  enoxaparin Injectable 40 milliGRAM(s) SubCutaneous every 24 hours  furosemide   Injectable 20 milliGRAM(s) IV Push daily  losartan 100 milliGRAM(s) Oral daily  metoprolol succinate ER 50 milliGRAM(s) Oral daily  pantoprazole    Tablet 40 milliGRAM(s) Oral before breakfast  sertraline 100 milliGRAM(s) Oral daily    MEDICATIONS  (PRN):  acetaminophen     Tablet .. 650 milliGRAM(s) Oral every 6 hours PRN Temp greater or equal to 38C (100.4F)

## 2023-03-22 NOTE — PROGRESS NOTE ADULT - SUBJECTIVE AND OBJECTIVE BOX
Patient is a 89y old  Female who presents with a chief complaint of Per chart, "89yo F w/ PMHx of [place], macular degeneration, mci/dementia, ?meningiomas, HTN, HLD, CVA, PUD, colon ca s/p resection, bezoar, gastroparesis, presents with lethargy, fever, and acute hypoxic respiratory failure concerning for urinary tract infection vs pneumonia."     (22 Mar 2023 16:44)      SUBJECTIVE / OVERNIGHT EVENTS: feels better. HHA at bedside.  Review of Systems  chest pain no  palpitations no  sob no  nausea no  headache no    MEDICATIONS  (STANDING):  albuterol/ipratropium for Nebulization 3 milliLiter(s) Nebulizer every 6 hours  aspirin enteric coated 81 milliGRAM(s) Oral daily  atorvastatin 10 milliGRAM(s) Oral at bedtime  cefTRIAXone   IVPB 1000 milliGRAM(s) IV Intermittent every 24 hours  enoxaparin Injectable 40 milliGRAM(s) SubCutaneous every 24 hours  furosemide   Injectable 20 milliGRAM(s) IV Push daily  losartan 100 milliGRAM(s) Oral daily  metoprolol succinate ER 50 milliGRAM(s) Oral daily  pantoprazole    Tablet 40 milliGRAM(s) Oral before breakfast  sertraline 100 milliGRAM(s) Oral daily    MEDICATIONS  (PRN):  acetaminophen     Tablet .. 650 milliGRAM(s) Oral every 6 hours PRN Temp greater or equal to 38C (100.4F)      Vital Signs Last 24 Hrs  T(C): 37.1 (22 Mar 2023 15:32), Max: 37.2 (22 Mar 2023 00:03)  T(F): 98.8 (22 Mar 2023 15:32), Max: 99 (22 Mar 2023 00:03)  HR: 71 (22 Mar 2023 15:32) (71 - 86)  BP: 156/75 (22 Mar 2023 15:32) (156/75 - 167/85)  BP(mean): --  RR: 18 (22 Mar 2023 16:19) (18 - 18)  SpO2: 98% (22 Mar 2023 16:19) (91% - 99%)    Parameters below as of 22 Mar 2023 16:19  Patient On (Oxygen Delivery Method): room air        PHYSICAL EXAM:  GENERAL: NAD, well-developed  HEAD:  Atraumatic, Normocephalic  EYES: EOMI, PERRLA, conjunctiva and sclera clear  NECK: Supple, No JVD  CHEST/LUNG: Clear to auscultation bilaterally; No wheeze  HEART: Regular rate and rhythm; No murmurs, rubs, or gallops  ABDOMEN: Soft, Nontender, Nondistended; Bowel sounds present  EXTREMITIES:  2+ Peripheral Pulses, No clubbing, cyanosis, or edema  PSYCH: AAOx3  NEUROLOGY: non-focal  SKIN: No rashes or lesions    LABS:                        11.1   9.99  )-----------( 297      ( 22 Mar 2023 06:55 )             35.2     03-22    137  |  101  |  16  ----------------------------<  82  4.0   |  26  |  0.45<L>    Ca    9.5      22 Mar 2023 06:51                  RADIOLOGY & ADDITIONAL TESTS:    Imaging Personally Reviewed:    Consultant(s) Notes Reviewed:      Care Discussed with Consultants/Other Providers:

## 2023-03-22 NOTE — DIETITIAN INITIAL EVALUATION ADULT - ENERGY INTAKE
- Pt's daughter and RN report poor appetite/PO intake since admission, is currently ordered for a Kosher diet. ~50% of meals x >/5 days consumed.   - Pt's daughter is amenable to pt receiving oral nutrition supplements, would like to receive Ensure shakes 1x/day.

## 2023-03-23 ENCOUNTER — TRANSCRIPTION ENCOUNTER (OUTPATIENT)
Age: 88
End: 2023-03-23

## 2023-03-23 VITALS
RESPIRATION RATE: 18 BRPM | HEART RATE: 75 BPM | SYSTOLIC BLOOD PRESSURE: 153 MMHG | OXYGEN SATURATION: 93 % | TEMPERATURE: 98 F | DIASTOLIC BLOOD PRESSURE: 75 MMHG

## 2023-03-23 PROCEDURE — 87086 URINE CULTURE/COLONY COUNT: CPT

## 2023-03-23 PROCEDURE — 85027 COMPLETE CBC AUTOMATED: CPT

## 2023-03-23 PROCEDURE — 83880 ASSAY OF NATRIURETIC PEPTIDE: CPT

## 2023-03-23 PROCEDURE — 99285 EMERGENCY DEPT VISIT HI MDM: CPT

## 2023-03-23 PROCEDURE — 83735 ASSAY OF MAGNESIUM: CPT

## 2023-03-23 PROCEDURE — 85025 COMPLETE CBC W/AUTO DIFF WBC: CPT

## 2023-03-23 PROCEDURE — 36415 COLL VENOUS BLD VENIPUNCTURE: CPT

## 2023-03-23 PROCEDURE — 92526 ORAL FUNCTION THERAPY: CPT

## 2023-03-23 PROCEDURE — 85018 HEMOGLOBIN: CPT

## 2023-03-23 PROCEDURE — 84145 PROCALCITONIN (PCT): CPT

## 2023-03-23 PROCEDURE — 87186 SC STD MICRODIL/AGAR DIL: CPT

## 2023-03-23 PROCEDURE — 92610 EVALUATE SWALLOWING FUNCTION: CPT

## 2023-03-23 PROCEDURE — 85610 PROTHROMBIN TIME: CPT

## 2023-03-23 PROCEDURE — 97110 THERAPEUTIC EXERCISES: CPT

## 2023-03-23 PROCEDURE — 82435 ASSAY OF BLOOD CHLORIDE: CPT

## 2023-03-23 PROCEDURE — 82330 ASSAY OF CALCIUM: CPT

## 2023-03-23 PROCEDURE — 71250 CT THORAX DX C-: CPT

## 2023-03-23 PROCEDURE — 84295 ASSAY OF SERUM SODIUM: CPT

## 2023-03-23 PROCEDURE — 85014 HEMATOCRIT: CPT

## 2023-03-23 PROCEDURE — 80053 COMPREHEN METABOLIC PANEL: CPT

## 2023-03-23 PROCEDURE — 85730 THROMBOPLASTIN TIME PARTIAL: CPT

## 2023-03-23 PROCEDURE — 82947 ASSAY GLUCOSE BLOOD QUANT: CPT

## 2023-03-23 PROCEDURE — 84132 ASSAY OF SERUM POTASSIUM: CPT

## 2023-03-23 PROCEDURE — 97116 GAIT TRAINING THERAPY: CPT

## 2023-03-23 PROCEDURE — 84484 ASSAY OF TROPONIN QUANT: CPT

## 2023-03-23 PROCEDURE — 81001 URINALYSIS AUTO W/SCOPE: CPT

## 2023-03-23 PROCEDURE — 83605 ASSAY OF LACTIC ACID: CPT

## 2023-03-23 PROCEDURE — 84100 ASSAY OF PHOSPHORUS: CPT

## 2023-03-23 PROCEDURE — 82803 BLOOD GASES ANY COMBINATION: CPT

## 2023-03-23 PROCEDURE — 93306 TTE W/DOPPLER COMPLETE: CPT

## 2023-03-23 PROCEDURE — 96365 THER/PROPH/DIAG IV INF INIT: CPT

## 2023-03-23 PROCEDURE — 96367 TX/PROPH/DG ADDL SEQ IV INF: CPT

## 2023-03-23 PROCEDURE — 87040 BLOOD CULTURE FOR BACTERIA: CPT

## 2023-03-23 PROCEDURE — 96375 TX/PRO/DX INJ NEW DRUG ADDON: CPT

## 2023-03-23 PROCEDURE — 94640 AIRWAY INHALATION TREATMENT: CPT

## 2023-03-23 PROCEDURE — 71045 X-RAY EXAM CHEST 1 VIEW: CPT

## 2023-03-23 PROCEDURE — 0225U NFCT DS DNA&RNA 21 SARSCOV2: CPT

## 2023-03-23 PROCEDURE — 80048 BASIC METABOLIC PNL TOTAL CA: CPT

## 2023-03-23 RX ORDER — FUROSEMIDE 40 MG
1 TABLET ORAL
Qty: 0 | Refills: 0 | DISCHARGE

## 2023-03-23 RX ORDER — AMLODIPINE BESYLATE 2.5 MG/1
1 TABLET ORAL
Qty: 0 | Refills: 0 | DISCHARGE

## 2023-03-23 RX ORDER — CEFUROXIME AXETIL 250 MG
250 TABLET ORAL EVERY 12 HOURS
Refills: 0 | Status: DISCONTINUED | OUTPATIENT
Start: 2023-03-23 | End: 2023-03-23

## 2023-03-23 RX ORDER — CEFUROXIME AXETIL 250 MG
1 TABLET ORAL
Qty: 6 | Refills: 0
Start: 2023-03-23 | End: 2023-03-25

## 2023-03-23 RX ORDER — FUROSEMIDE 40 MG
1 TABLET ORAL
Qty: 30 | Refills: 0
Start: 2023-03-23 | End: 2023-04-21

## 2023-03-23 RX ADMIN — Medication 81 MILLIGRAM(S): at 12:31

## 2023-03-23 RX ADMIN — Medication 250 MILLIGRAM(S): at 15:10

## 2023-03-23 RX ADMIN — Medication 50 MILLIGRAM(S): at 05:48

## 2023-03-23 RX ADMIN — PANTOPRAZOLE SODIUM 40 MILLIGRAM(S): 20 TABLET, DELAYED RELEASE ORAL at 05:48

## 2023-03-23 RX ADMIN — ENOXAPARIN SODIUM 40 MILLIGRAM(S): 100 INJECTION SUBCUTANEOUS at 05:49

## 2023-03-23 RX ADMIN — LOSARTAN POTASSIUM 100 MILLIGRAM(S): 100 TABLET, FILM COATED ORAL at 05:48

## 2023-03-23 RX ADMIN — Medication 20 MILLIGRAM(S): at 05:48

## 2023-03-23 RX ADMIN — SERTRALINE 100 MILLIGRAM(S): 25 TABLET, FILM COATED ORAL at 12:31

## 2023-03-23 RX ADMIN — Medication 3 MILLILITER(S): at 12:31

## 2023-03-23 RX ADMIN — Medication 3 MILLILITER(S): at 05:49

## 2023-03-23 RX ADMIN — Medication 3 MILLILITER(S): at 00:37

## 2023-03-23 NOTE — DISCHARGE NOTE NURSING/CASE MANAGEMENT/SOCIAL WORK - PATIENT PORTAL LINK FT
You can access the FollowMyHealth Patient Portal offered by [facility] by registering at the following website: [email or web address]. By joining [employer]’s FollowMyHealth portal, you will also be able to view your health information using other applications (apps) compatible with our system.

## 2023-03-23 NOTE — PROGRESS NOTE ADULT - SUBJECTIVE AND OBJECTIVE BOX
Patient is a 89y old  Female who presents with a chief complaint of urinary tract infection, lethargy, acute hypoxic respiratory failure (23 Mar 2023 14:48)      SUBJECTIVE / OVERNIGHT EVENTS: feels better. Wants to go home.   Review of Systems  chest pain no  palpitations no  sob no  nausea no  headache no    MEDICATIONS  (STANDING):  albuterol/ipratropium for Nebulization 3 milliLiter(s) Nebulizer every 6 hours  aspirin enteric coated 81 milliGRAM(s) Oral daily  atorvastatin 10 milliGRAM(s) Oral at bedtime  cefuroxime   Tablet 250 milliGRAM(s) Oral every 12 hours  enoxaparin Injectable 40 milliGRAM(s) SubCutaneous every 24 hours  furosemide   Injectable 20 milliGRAM(s) IV Push daily  losartan 100 milliGRAM(s) Oral daily  metoprolol succinate ER 50 milliGRAM(s) Oral daily  pantoprazole    Tablet 40 milliGRAM(s) Oral before breakfast  sertraline 100 milliGRAM(s) Oral daily    MEDICATIONS  (PRN):  acetaminophen     Tablet .. 650 milliGRAM(s) Oral every 6 hours PRN Temp greater or equal to 38C (100.4F)      Vital Signs Last 24 Hrs  T(C): 36.5 (23 Mar 2023 15:23), Max: 37.1 (23 Mar 2023 00:00)  T(F): 97.7 (23 Mar 2023 15:23), Max: 98.7 (23 Mar 2023 00:00)  HR: 75 (23 Mar 2023 15:23) (72 - 90)  BP: 153/75 (23 Mar 2023 15:23) (138/78 - 155/72)  BP(mean): --  RR: 18 (23 Mar 2023 15:23) (18 - 18)  SpO2: 93% (23 Mar 2023 15:23) (92% - 96%)    Parameters below as of 23 Mar 2023 15:23  Patient On (Oxygen Delivery Method): room air        PHYSICAL EXAM:  GENERAL: NAD, well-developed  HEAD:  Atraumatic, Normocephalic  EYES: EOMI, PERRLA, conjunctiva and sclera clear  NECK: Supple, No JVD  CHEST/LUNG: Clear to auscultation bilaterally; No wheeze  HEART: Regular rate and rhythm; No murmurs, rubs, or gallops  ABDOMEN: Soft, Nontender, Nondistended; Bowel sounds present  EXTREMITIES:  2+ Peripheral Pulses, No clubbing, cyanosis, or edema  PSYCH: AAOx3  NEUROLOGY: non-focal  SKIN: No rashes or lesions    LABS:                        11.1   9.99  )-----------( 297      ( 22 Mar 2023 06:55 )             35.2     03-22    137  |  101  |  16  ----------------------------<  82  4.0   |  26  |  0.45<L>    Ca    9.5      22 Mar 2023 06:51                  RADIOLOGY & ADDITIONAL TESTS:    Imaging Personally Reviewed:    Consultant(s) Notes Reviewed:      Care Discussed with Consultants/Other Providers:

## 2023-03-23 NOTE — DISCHARGE NOTE PROVIDER - DETAILS OF MALNUTRITION DIAGNOSIS/DIAGNOSES
This patient has been assessed with a concern for Malnutrition and was treated during this hospitalization for the following Nutrition diagnosis/diagnoses:     -  03/22/2023: Severe protein-calorie malnutrition

## 2023-03-23 NOTE — DISCHARGE NOTE PROVIDER - NSDCMRMEDTOKEN_GEN_ALL_CORE_FT
amLODIPine 5 mg oral tablet: 1 tab(s) orally once a day  aspirin 81 mg oral delayed release tablet: 1 tab(s) orally once a day  atorvastatin 10 mg oral tablet: 1 tab(s) orally once a day (at bedtime)  furosemide 40 mg oral tablet: 1 tab(s) orally every other day  losartan 100 mg oral tablet: 1 tab(s) orally once a day  metoprolol succinate 50 mg oral tablet, extended release: 1 tab(s) orally once a day  motilium: 1 tab(s) orally 2 times a day  omeprazole 20 mg oral delayed release capsule: 1 cap(s) orally once a day  sertraline 100 mg oral tablet: 1 tab(s) orally once a day   aspirin 81 mg oral delayed release tablet: 1 tab(s) orally once a day  atorvastatin 10 mg oral tablet: 1 tab(s) orally once a day (at bedtime)  cefuroxime 250 mg oral tablet: 1 tab(s) orally every 12 hours  Lasix 20 mg oral tablet: 1 tab(s) orally once a day   losartan 100 mg oral tablet: 1 tab(s) orally once a day  metoprolol succinate 50 mg oral tablet, extended release: 1 tab(s) orally once a day  omeprazole 20 mg oral delayed release capsule: 1 cap(s) orally once a day  sertraline 100 mg oral tablet: 1 tab(s) orally once a day   aspirin 81 mg oral delayed release tablet: 1 tab(s) orally once a day  atorvastatin 10 mg oral tablet: 1 tab(s) orally once a day (at bedtime)  cefuroxime 250 mg oral tablet: 1 tab(s) orally every 12 hours  Lasix 40 mg oral tablet: 1 tab(s) orally once a day alternating with 20 mgs , previous home dose and clarified with Cardiology  losartan 100 mg oral tablet: 1 tab(s) orally once a day  metoprolol succinate 50 mg oral tablet, extended release: 1 tab(s) orally once a day  Norvasc 5 mg oral tablet: 1 tab(s) orally once a day  omeprazole 20 mg oral delayed release capsule: 1 cap(s) orally once a day  sertraline 100 mg oral tablet: 1 tab(s) orally once a day

## 2023-03-23 NOTE — DISCHARGE NOTE NURSING/CASE MANAGEMENT/SOCIAL WORK - NSDCPEFALRISK_GEN_ALL_CORE
For information on Fall & Injury Prevention, visit: [email or web address] OR  [email or web address] OR  https://www.cdc.gov/steadi/patient.html

## 2023-03-23 NOTE — DISCHARGE NOTE PROVIDER - CARE PROVIDER_API CALL
[facility]  INTERNAL MEDICINE  [address] [place] [address]  Phone: [phone]  Fax: [phone]  Established Patient  Follow Up Time: 1 week

## 2023-03-23 NOTE — DISCHARGE NOTE PROVIDER - HOSPITAL COURSE
Reason for Admission: urinary tract infection, lethargy, acute hypoxic respiratory failure  History of Present Illness:   89yo F w/ PMHx of [place], macular degeneration, mci/dementia, ?meningiomas, HTN, HLD, CVA, PUD, colon ca s/p resection, bezoar, gastroparesis, presents with lethargy, history obtained from daughters at bedside, they report that the home health aid called them to report that the pt was not feeling well, she vomiting, was more lethargic and had a fever of 101, pt recently admitted to [facility] 2/17 for acute hypoxic respiratory failure secondary to rhino/enterovirus infection and severe hypertension, daughters report that since discharge, she has had an ongoing cough, with clear phlegm production and SOB with occasional wheezing, she has worsening confusion over the last few months, they deny chest pain, changes in stool, urinary symptoms, slurred speech, weakness on one side compared to other, changes in ambulation, in the ED, pt was hypoxic requiring NRB which was then weaned down to 1L NC, labs were notable for leukocytosis, grossly positive U/A, BNP mildly elevated above prior, pt was given Vanc/Zosyn, 1L NS, Ofirmev x1, admitted to general medicine for further management .      Patient was admitted for management of sepsis secondary to ecoli in the urine , She received intravneous antibiotics now transition to oral ceftin for total of 10 days . She was seen by cardiology for bilateral effusions , treated with intravenous lasix now decreased to 20 mgs daily. Patient is now stable for discharge and will follow up with her PMD     Reason for Admission: urinary tract infection, lethargy, acute hypoxic respiratory failure  History of Present Illness:   89yo F w/ PMHx of [place], macular degeneration, mci/dementia, ?meningiomas, HTN, HLD, CVA, PUD, colon ca s/p resection, bezoar, gastroparesis, presents with lethargy, history obtained from daughters at bedside, they report that the home health aid called them to report that the pt was not feeling well, she vomiting, was more lethargic and had a fever of 101, pt recently admitted to [facility] 2/17 for acute hypoxic respiratory failure secondary to rhino/enterovirus infection and severe hypertension, daughters report that since discharge, she has had an ongoing cough, with clear phlegm production and SOB with occasional wheezing, she has worsening confusion over the last few months, they deny chest pain, changes in stool, urinary symptoms, slurred speech, weakness on one side compared to other, changes in ambulation, in the ED, pt was hypoxic requiring NRB which was then weaned down to 1L NC, labs were notable for leukocytosis, grossly positive U/A, BNP mildly elevated above prior, pt was given Vanc/Zosyn, 1L NS, Ofirmev x1, admitted to general medicine for further management .      Patient was admitted for management of sepsis secondary to ecoli in the urine , She received intravneous antibiotics now transition to oral ceftin for total of 10 days . She was seen by cardiology for pericardial effusions but no tamponade, treated with intravenous lasix now decreased to 20 mgs daily. Patient is now stable for discharge and will follow up with her PMD

## 2023-03-23 NOTE — PROGRESS NOTE ADULT - SUBJECTIVE AND OBJECTIVE BOX
CC: f/u for E Coli UTI    Patient reports: she is alert, appears comfortable but confused, RN's are monitoring PVR, she has been able to spontaneously void.    REVIEW OF SYSTEMS:  All other review of systems negative (Comprehensive ROS)    Antimicrobials Day #  :day 7  cefTRIAXone   IVPB 1000 milliGRAM(s) IV Intermittent every 24 hours    Other Medications Reviewed    T(F): 97.9 (03-23-23 @ 08:20), Max: 98.8 (03-22-23 @ 15:32)  HR: 90 (03-23-23 @ 08:20)  BP: 155/72 (03-23-23 @ 08:20)  RR: 18 (03-23-23 @ 08:20)  SpO2: 96% (03-23-23 @ 08:20)  Wt(kg): --    PHYSICAL EXAM:  General: alert, no acute distress  Eyes:  anicteric, no conjunctival injection, no discharge  Oropharynx: no lesions or injection 	  Neck: supple, without adenopathy  Lungs: clear to auscultation  Heart: regular rate and rhythm; no murmur, rubs or gallops  Abdomen: soft, nondistended, nontender, without mass or organomegaly  Skin: no lesions  Extremities: no clubbing, cyanosis, or edema  Neurologic: alert, oriented, moves all extremities    LAB RESULTS:                        11.1   9.99  )-----------( 297      ( 22 Mar 2023 06:55 )             35.2     03-22    137  |  101  |  16  ----------------------------<  82  4.0   |  26  |  0.45<L>    Ca    9.5      22 Mar 2023 06:51          MICROBIOLOGY:  RECENT CULTURES:      RADIOLOGY REVIEWED:

## 2023-03-23 NOTE — DISCHARGE NOTE PROVIDER - NSDCCPCAREPLAN_GEN_ALL_CORE_FT
PRINCIPAL DISCHARGE DIAGNOSIS  Diagnosis: E-coli UTI  Assessment and Plan of Treatment: HOME CARE INSTRUCTIONS  f you were prescribed antibiotics, take them exactly as your caregiver instructs you. Finish the medication even if you feel better after you have only taken some of the medication.  Drink enough water and fluids to keep your urine clear or pale yellow.  Avoid caffeine, tea, and carbonated beverages. They tend to irritate your bladder.  Empty your bladder often. Avoid holding urine for long periods of time.  Empty your bladder before and after sexual intercourse.  After a bowel movement, women should cleanse from front to back. Use each tissue only once.  SEEK MEDICAL CARE IF:  You have back pain.  You develop a fever.  Your symptoms do not begin to resolve within 3 days.  SEEK IMMEDIATE MEDICAL CARE IF:  You have severe back pain or lower abdominal pain.  You develop chills.  You have nausea or vomiting.  You have continued burning or discomfort with urination.        SECONDARY DISCHARGE DIAGNOSES  Diagnosis: Generalized anxiety disorder  Assessment and Plan of Treatment: c/w zoloft    Diagnosis: Hypertension  Assessment and Plan of Treatment: Follow up with your medical doctor to establish long term blood pressure treatment goals. c/w metoprolol and losarten      Diagnosis: Acute respiratory failure with hypoxia  Assessment and Plan of Treatment: pt had an echocardiogram that show pericardial effusion but no tamponade, recieved lasix intravneously now transition to oral lasix, Weigh yourself daily.  If you gain 3lbs in 3 days, or 5lbs in a week call your Health Care Provider.  Do not eat or drink foods containing more than 2000mg of salt (sodium) in your diet every day.  Call your Health Care Provider if you have any swelling or increased swelling in your feet, ankles, and/or stomach.  Take all of your medication as directed.  If you become dizzy call your Health Care Provider.      Diagnosis: Lethargy  Assessment and Plan of Treatment: resolved back to baseline

## 2023-03-23 NOTE — PROGRESS NOTE ADULT - TIME BILLING
Agree with above PA note.  89yo F w/ PMHx of [place], macular degeneration, mci/dementia, ?meningiomas, HTN, HLD, CVA, PUD, colon ca s/p resection, bezoar, gastroparesis, presents with lethargy i/s/o UTI.    ECHO with small and moderate pericardial effusion   no clinical/echo tamponade  cont IV lasix for now

## 2023-03-23 NOTE — PROGRESS NOTE ADULT - REASON FOR ADMISSION
urinary tract infection, lethargy, acute hypoxic respiratory failure

## 2023-03-23 NOTE — PROGRESS NOTE ADULT - PROVIDER SPECIALTY LIST ADULT
Hospitalist
Hospitalist
Infectious Disease
Internal Medicine
Cardiology
Infectious Disease
Internal Medicine
Internal Medicine
Hospitalist

## 2023-03-23 NOTE — PROGRESS NOTE ADULT - ASSESSMENT
89yo F w/ PMHx of [place], macular degeneration, mci/dementia, ?meningiomas, HTN, HLD, CVA, PUD, colon ca s/p resection, bezoar, gastroparesis, presents with lethargy, fever, and acute hypoxic respiratory failure concerning for urinary tract infection vs pneumonia     Urinary tract infection.   - grossly positive U/A w/ 101 fever at home and significant leukocytosis concerning for UTI  - blood cultures NGTD  - Urine cx with E. coli ; f/up sensitivities  - CT : Stable lung nodules and thoracic lymphadenopathy since 2/17/2023.   - deescalate from Ertapenem  to Ceftriaxone. Change to oral antibiotic Ceftin for 10 days.   - ID follow   - c/w Tylenol PRN for fever.    Acute respiratory failure with hypoxia.   - CXR with small bilateral pleural effusions and atelectasis similar to prior imaging from 2/2023  - last echo 2018 with mild diastolic dysfunction and mildly elevated BNP on presentation  - low suspicion of ACS, troponin negative x2, EKG sinus without ischemic changes   - known pulmonary nodules on prior imaging   - CT chest done to r/o  superimposed aspiration PNA ; no infilterate ; does show increased bilateral pleural effusions  - Lasix 20 mg IV   - TTE noted  - On NC1 L,  wean off O2 as tolerated    Pericardial effusion  - Cardiology evaluation dr. Hunter noted    Lethargy resolved.   - likely metabolic encephalopathy from active infection   - maintain fall, seizure, aspiration precautions; keep head end of bed elevated  - delirium precautions  - Treatment of infection , cards w/u as above  - TSH wnl  - Patient with poor po intake. 1:1 assist with meals. Nutritionist eval.    Hypertension.   - c/w home meds metoprolol and losartan  - HOLD amlodipine, resume as tolerated.    Generalized anxiety disorder.   - c/w home med sertraline.    Need for prophylactic measure.   - dvt ppx: lovenox    ambulate: with assistance ; PT eval    diet: regular    gi ppx: omeprazole --> pantoprazole    d/w daughter over phone and HHA at bedside.     DC home. Follow with PMD in 3-4 days.     Adrian Basurto MD phone [phone]  
Echo 3/20/23: Nml LV fxn EF 62%, mild diastolic dysfxn, Small/Moderate pericardial effusion    A/p  89yo F w/ PMHx of [place], macular degeneration, mci/dementia, ?meningiomas, HTN, HLD, CVA, PUD, colon ca s/p resection, bezoar, gastroparesis, presents with lethargy i/s/o UTI.    #Pericardial Effusion  -Area of small and moderate effusion on echo  -No c/f tamponade  -Per my discussion w/ pts daughter, she would like to defer thoracic eval at this time    #AHRF, CHG  -Possibly d/t CHF exacerbation i/s/o uti  -CT chest w/ b/l pleural effusions  -S/P IV lasix  -Continue metoprolol  -Can d/c on home dose lasix 40mg/20mg    #CAD  -Cont ASA  -Cont Statin    #HTN  -Cont bb  -Cont losartan    #UTI  -Abx per ID      d/w acp  Please call/text me with any questions/concerns between 8am-4pm  [phone]
Patient is a 89y female with a past history of macular degeneration, dementia, HTN, HLD, CVA, coon resection for cancer, and recent 24 hours admission in mid Feb for cough and hypoxia who was brought to the [place] ER on 3/17 with a low grade fever,lethargy, and hypoxia.  Her urine was cloudy and she was started on antibiotics for a presumed  infection. Her fever is improved, her leukocytosis with a wbc over 20,000 has moderated. Her CT scan of chest shows B/L effusions and atelectasis.  She has passed a swallow evaluation.  Her admission in mid Feb was notable for a positive RVP-RV/EV, no fever, normal wbc, and a normal PC.   She was not discharged on any antibiotics. She reportedly had a cough and upper airway congestion post discharge.  Her doses of lasix and amlodipine were adjusted as OPD.She is receiving ertapenem.  She appears clinically stable and her wbc has decreased from 22,000 to 10,000.Her blood cultures remain negative  She apparently is more alert. She has been on appropriate antibiotics targeting the E Coli in the urine x 6 days.  No other infection is obvious,she appears to have a component of retention, she required a strait cath for a large PVR.  Ertapenem was switched to CTX on 3/21.  Suggest:  1. Favor 10 days total antibiotics, will switch to oral agent in AM.  2. recent hypoxia does not appear to be related to active pulmonary infection  3. Consider renal/bladder ultrasound or urology evaluation or simply just monitoring bladder residuals.
89yo F w/ PMHx of [place], macular degeneration, mci/dementia, ?meningiomas, HTN, HLD, CVA, PUD, colon ca s/p resection, bezoar, gastroparesis, presents with lethargy, fever, and acute hypoxic respiratory failure concerning for urinary tract infection vs pneumonia     Urinary tract infection.   - grossly positive U/A w/ 101 fever at home and significant leukocytosis concerning for UTI  - blood cultures NGTD  - Urine cx with E. coli ; f/up sensitivities  - CT : Stable lung nodules and thoracic lymphadenopathy since 2/17/2023.   - deescalate from Ertapenem  to Ceftriaxone  - ID follow   - c/w Tylenol PRN for fever.    Acute respiratory failure with hypoxia.   - CXR with small bilateral pleural effusions and atelectasis similar to prior imaging from 2/2023  - last echo 2018 with mild diastolic dysfunction and mildly elevated BNP on presentation  - low suspicion of ACS, troponin negative x2, EKG sinus without ischemic changes   - known pulmonary nodules on prior imaging   - CT chest done to r/o  superimposed aspiration PNA ; no infilterate ; does show increased bilateral pleural effusions  - Lasix 20 mg IV   - TTE noted  - On NC1 L,  wean off O2 as tolerated    Pericardial effusion  - Cardiology evaluation dr. Hunter noted    Lethargy resolved.   - likely metabolic encephalopathy from active infection   - maintain fall, seizure, aspiration precautions; keep head end of bed elevated  - delirium precautions  - Treatment of infection , cards w/u as above  - TSH wnl  - Patient with poor po intake. 1:1 assist with meals. Nutritionist eval.    Hypertension.   - c/w home meds metoprolol and losartan  - HOLD amlodipine, resume as tolerated.    Generalized anxiety disorder.   - c/w home med sertraline.    Need for prophylactic measure.   - dvt ppx: lovenox    ambulate: with assistance ; PT eval    diet: regular    gi ppx: omeprazole --> pantoprazole    d/w daughter over phone and HHA at bedside.     DCP home.     Adrian Basurto MD phone [phone]  
89yo F w/ PMHx of [place], macular degeneration, mci/dementia, ?meningiomas, HTN, HLD, CVA, PUD, colon ca s/p resection, bezoar, gastroparesis, presents with lethargy, fever, and acute hypoxic respiratory failure concerning for urinary tract infection vs pneumonia     Urinary tract infection.   - grossly positive U/A w/ 101 fever at home and significant leukocytosis concerning for UTI  - blood cultures NGTD  - Urine cx with E. coli ; f/up sensitivities  - CT : Stable lung nodules and thoracic lymphadenopathy since 2/17/2023.   - deescalate from Ertapenem  to Ceftriaxone  - ID evaluation   - c/w Tylenol PRN for fever.    Acute respiratory failure with hypoxia.   - CXR with small bilateral pleural effusions and atelectasis similar to prior imaging from 2/2023  - last echo 2018 with mild diastolic dysfunction and mildly elevated BNP on presentation  - low suspicion of ACS, troponin negative x2, EKG sinus without ischemic changes   - known pulmonary nodules on prior imaging   - CT chest done to r/o  superimposed aspiration PNA ; no infilterate ; does show increased bilateral pleural effusions  - s/p IV Lasix   - TTE noted  - On NC1 L,  wean off O2 as tolerated    Pericardial effusion  - Cardiology evaluation dr. Hunter     Lethargy resolved.   - likely metabolic encephalopathy from active infection   - maintain fall, seizure, aspiration precautions; keep head end of bed elevated  - delirium precautions  - Treatment of infection , cards w/u as above  - TSH wnl  - Patient with poor po intake. 1:1 assist with meals. Nutritionist pebbles.  Hypertension.   - c/w home meds metoprolol and losartan  - HOLD amlodipine, resume as tolerated.    Generalized anxiety disorder.   - c/w home med sertraline.    Need for prophylactic measure.   - dvt ppx: lovenox    ambulate: with assistance ; PT eval in progress    diet: regular    gi ppx: omeprazole --> pantoprazole    d/w daughter over phone    Adrian Basurto MD phone [phone]    
Patient is a 89y female with a past history of macular degeneration, dementia, HTN, HLD, CVA, coon resection for cancer, and recent 24 hours admission in mid Feb for cough and hypoxia who was brought to the [place] ER on 3/17 with a low grade fever,lethargy, and hypoxia.  Her urine was cloudy and she was started on antibiotics for a presumed  infection. Her fever is improved, her leukocytosis with a wbc over 20,000 has moderated. Her CT scan of chest shows B/L effusions and atelectasis.  She has passed a swallow evaluation.  Her admission in mid Feb was notable for a positive RVP-RV/EV, no fever, normal wbc, and a normal PC.   She was not discharged on any antibiotics. She reportedly had a cough and upper airway congestion post discharge.  Her doses of lasix and amlodipine were adjusted as OPD.She is receiving ertapenem.  She appears clinically stable and her wbc has decreased from 22,000 to 10,000.Her blood cultures remain negative  She apparently is more alert. She has been on appropriate antibiotics targeting the E Coli in the urine x 6 days.  No other infection is obvious,she appears to have a component of retention, she required a strait cath for a large PVR.  Ertapenem was switched to CTX on 3/21.Her leukocytosis has resolved.  Suggest:  1. Will switch to ceftin to complete 10 days treatment for febrile  infection  2. recent hypoxia does not appear to be related to active pulmonary infection  3. Disposition per medicine.
yes
89yo F w/ PMHx of [place], macular degeneration, mci/dementia, ?meningiomas, HTN, HLD, CVA, PUD, colon ca s/p resection, bezoar, gastroparesis, presents with lethargy, fever, and acute hypoxic respiratory failure concerning for urinary tract infection vs pneumonia 
89yo F w/ PMHx of [place], macular degeneration, mci/dementia, ?meningiomas, HTN, HLD, CVA, PUD, colon ca s/p resection, bezoar, gastroparesis, presents with lethargy, fever, and acute hypoxic respiratory failure concerning for urinary tract infection vs pneumonia 
89yo F w/ PMHx of [place], macular degeneration, mci/dementia, ?meningiomas, HTN, HLD, CVA, PUD, colon ca s/p resection, bezoar, gastroparesis, presents with lethargy, fever, and acute hypoxic respiratory failure concerning for urinary tract infection vs pneumonia

## 2023-08-08 ENCOUNTER — APPOINTMENT (OUTPATIENT)
Dept: INTERNAL MEDICINE | Facility: HOME HEALTH | Age: 88
End: 2023-08-08
Payer: MEDICARE

## 2023-08-08 ENCOUNTER — APPOINTMENT (OUTPATIENT)
Dept: INTERNAL MEDICINE | Facility: CLINIC | Age: 88
End: 2023-08-08

## 2023-08-08 VITALS
HEART RATE: 73 BPM | DIASTOLIC BLOOD PRESSURE: 84 MMHG | SYSTOLIC BLOOD PRESSURE: 124 MMHG | OXYGEN SATURATION: 95 % | RESPIRATION RATE: 16 BRPM

## 2023-08-08 VITALS
RESPIRATION RATE: 16 BRPM | HEART RATE: 73 BPM | DIASTOLIC BLOOD PRESSURE: 84 MMHG | OXYGEN SATURATION: 95 % | SYSTOLIC BLOOD PRESSURE: 124 MMHG

## 2023-08-08 VITALS — DIASTOLIC BLOOD PRESSURE: 84 MMHG | SYSTOLIC BLOOD PRESSURE: 124 MMHG

## 2023-08-08 DIAGNOSIS — Z86.73 PERSONAL HISTORY OF TRANSIENT ISCHEMIC ATTACK (TIA), AND CEREBRAL INFARCTION W/OUT RESIDUAL DEFICITS: ICD-10-CM

## 2023-08-08 DIAGNOSIS — Z85.038 PERSONAL HISTORY OF OTHER MALIGNANT NEOPLASM OF LARGE INTESTINE: ICD-10-CM

## 2023-08-08 PROCEDURE — 99344 HOME/RES VST NEW MOD MDM 60: CPT

## 2023-08-09 RX ORDER — NIRMATRELVIR AND RITONAVIR 300-100 MG
20 X 150 MG & KIT ORAL
Qty: 1 | Refills: 0 | Status: ACTIVE | COMMUNITY
Start: 2023-08-09 | End: 1900-01-01

## 2023-08-11 LAB
RAPID RVP RESULT: DETECTED
SARS-COV-2 RNA PNL RESP NAA+PROBE: DETECTED

## 2023-08-20 PROBLEM — Z85.038 HISTORY OF MALIGNANT NEOPLASM OF COLON: Status: RESOLVED | Noted: 2023-08-20 | Resolved: 2023-08-20

## 2023-08-20 PROBLEM — Z86.73 H/O: CVA (CEREBROVASCULAR ACCIDENT): Status: RESOLVED | Noted: 2023-08-20 | Resolved: 2023-08-20

## 2023-08-20 NOTE — ASSESSMENT
[FreeTextEntry1] : Spoke with daughter and told her that the aide should keep her updated as to her breathing efforts if she starts to become short of breath or cannot finish a sentence and she should go to the hospital

## 2023-08-20 NOTE — PHYSICAL EXAM
[Alert] : alert [Well Nourished] : well nourished [No Acute Distress] : in no acute distress [Normal Outer Ear/Nose] : the ears and nose were normal in appearance [Normal Appearance] : the appearance of the neck was normal [Supple] : the neck was supple [No Respiratory Distress] : no respiratory distress [No Acc Muscle Use] : no accessory muscle use [Respiration, Rhythm And Depth] : normal respiratory rhythm and effort [Auscultation Breath Sounds / Voice Sounds] : lungs were clear to auscultation bilaterally [Heart Rate And Rhythm] : heart rate was normal and rhythm regular [Bowel Sounds] : normal bowel sounds [Abdomen Tenderness] : non-tender [Abdomen Soft] : soft [Normal Color / Pigmentation] : normal skin color and pigmentation [Normal Turgor] : normal skin turgor [No Focal Deficits] : no focal deficits [Normal Affect] : the affect was normal [Normal Mood] : the mood was normal [de-identified] : 1 b/l edema

## 2023-08-20 NOTE — REASON FOR VISIT
[Initial Evaluation] : an initial evaluation [Formal Caregiver] : formal caregiver [FreeTextEntry1] : Cough [FreeTextEntry2] : for Periodic Monthly Evaluation, homebound due to dementia, and ataxia

## 2023-08-20 NOTE — HISTORY OF PRESENT ILLNESS
[Completely Dependent] : Completely dependent. [Walker] : walker [FreeTextEntry1] : Patient's daughter was diagnosed with COVID, and she is one of her mother's caregivers.  She noted in the last 2 days or so that she has been having a cough

## 2023-09-10 ENCOUNTER — APPOINTMENT (OUTPATIENT)
Dept: INTERNAL MEDICINE | Facility: HOME HEALTH | Age: 88
End: 2023-09-10
Payer: MEDICARE

## 2023-09-10 VITALS
DIASTOLIC BLOOD PRESSURE: 80 MMHG | SYSTOLIC BLOOD PRESSURE: 130 MMHG | RESPIRATION RATE: 17 BRPM | HEART RATE: 71 BPM | OXYGEN SATURATION: 95 %

## 2023-09-10 DIAGNOSIS — K27.9 PEPTIC ULCER, SITE UNSPECIFIED, UNSPECIFIED AS ACUTE OR CHRONIC, W/OUT HEMORRHAGE OR PERFORATION: ICD-10-CM

## 2023-09-10 DIAGNOSIS — I10 ESSENTIAL (PRIMARY) HYPERTENSION: ICD-10-CM

## 2023-09-10 DIAGNOSIS — F03.90 UNSPECIFIED DEMENTIA W/OUT BEHAVIORAL DISTURBANCE: ICD-10-CM

## 2023-09-10 DIAGNOSIS — H35.30 UNSPECIFIED MACULAR DEGENERATION: ICD-10-CM

## 2023-09-10 DIAGNOSIS — E78.5 HYPERLIPIDEMIA, UNSPECIFIED: ICD-10-CM

## 2023-09-10 DIAGNOSIS — U07.1 COVID-19: ICD-10-CM

## 2023-09-10 PROCEDURE — 99349 HOME/RES VST EST MOD MDM 40: CPT | Mod: 25

## 2023-09-10 PROCEDURE — 96372 THER/PROPH/DIAG INJ SC/IM: CPT | Mod: 59

## 2023-09-11 ENCOUNTER — LABORATORY RESULT (OUTPATIENT)
Age: 88
End: 2023-09-11

## 2023-10-13 ENCOUNTER — EMERGENCY (EMERGENCY)
Facility: HOSPITAL | Age: 88
LOS: 1 days | Discharge: HOME | End: 2023-10-13
Attending: STUDENT IN AN ORGANIZED HEALTH CARE EDUCATION/TRAINING PROGRAM
Payer: MEDICARE

## 2023-10-13 VITALS
OXYGEN SATURATION: 98 % | RESPIRATION RATE: 16 BRPM | HEART RATE: 58 BPM | TEMPERATURE: 98 F | SYSTOLIC BLOOD PRESSURE: 112 MMHG | DIASTOLIC BLOOD PRESSURE: 63 MMHG

## 2023-10-13 VITALS
HEART RATE: 71 BPM | RESPIRATION RATE: 20 BRPM | OXYGEN SATURATION: 100 % | SYSTOLIC BLOOD PRESSURE: 131 MMHG | DIASTOLIC BLOOD PRESSURE: 75 MMHG

## 2023-10-13 DIAGNOSIS — Z90.49 ACQUIRED ABSENCE OF OTHER SPECIFIED PARTS OF DIGESTIVE TRACT: Chronic | ICD-10-CM

## 2023-10-13 DIAGNOSIS — Z90.710 ACQUIRED ABSENCE OF BOTH CERVIX AND UTERUS: Chronic | ICD-10-CM

## 2023-10-13 LAB
ALBUMIN SERPL ELPH-MCNC: 2.6 G/DL — LOW (ref 3.3–5)
ALP SERPL-CCNC: 171 U/L — HIGH (ref 40–120)
ALT FLD-CCNC: 14 U/L — SIGNIFICANT CHANGE UP (ref 10–45)
ANION GAP SERPL CALC-SCNC: 13 MMOL/L — SIGNIFICANT CHANGE UP (ref 5–17)
ANION GAP SERPL CALC-SCNC: 15 MMOL/L — SIGNIFICANT CHANGE UP (ref 5–17)
AST SERPL-CCNC: 17 U/L — SIGNIFICANT CHANGE UP (ref 10–40)
BASE EXCESS BLDV CALC-SCNC: 9.1 MMOL/L — HIGH (ref -2–3)
BASOPHILS # BLD AUTO: 0.06 K/UL — SIGNIFICANT CHANGE UP (ref 0–0.2)
BASOPHILS NFR BLD AUTO: 0.5 % — SIGNIFICANT CHANGE UP (ref 0–2)
BILIRUB SERPL-MCNC: 0.7 MG/DL — SIGNIFICANT CHANGE UP (ref 0.2–1.2)
BUN SERPL-MCNC: 19 MG/DL — SIGNIFICANT CHANGE UP (ref 7–23)
BUN SERPL-MCNC: 20 MG/DL — SIGNIFICANT CHANGE UP (ref 7–23)
CA-I SERPL-SCNC: 1.13 MMOL/L — LOW (ref 1.15–1.33)
CALCIUM SERPL-MCNC: 8 MG/DL — LOW (ref 8.4–10.5)
CALCIUM SERPL-MCNC: 8.5 MG/DL — SIGNIFICANT CHANGE UP (ref 8.4–10.5)
CHLORIDE BLDV-SCNC: 103 MMOL/L — SIGNIFICANT CHANGE UP (ref 96–108)
CHLORIDE SERPL-SCNC: 105 MMOL/L — SIGNIFICANT CHANGE UP (ref 96–108)
CHLORIDE SERPL-SCNC: 106 MMOL/L — SIGNIFICANT CHANGE UP (ref 96–108)
CO2 BLDV-SCNC: 37 MMOL/L — HIGH (ref 22–26)
CO2 SERPL-SCNC: 25 MMOL/L — SIGNIFICANT CHANGE UP (ref 22–31)
CO2 SERPL-SCNC: 26 MMOL/L — SIGNIFICANT CHANGE UP (ref 22–31)
CREAT SERPL-MCNC: 0.47 MG/DL — LOW (ref 0.5–1.3)
CREAT SERPL-MCNC: 0.54 MG/DL — SIGNIFICANT CHANGE UP (ref 0.5–1.3)
EGFR: 84 ML/MIN/1.73M2 — SIGNIFICANT CHANGE UP
EGFR: 87 ML/MIN/1.73M2 — SIGNIFICANT CHANGE UP
EOSINOPHIL # BLD AUTO: 0.34 K/UL — SIGNIFICANT CHANGE UP (ref 0–0.5)
EOSINOPHIL NFR BLD AUTO: 3 % — SIGNIFICANT CHANGE UP (ref 0–6)
GAS PNL BLDV: 140 MMOL/L — SIGNIFICANT CHANGE UP (ref 136–145)
GAS PNL BLDV: SIGNIFICANT CHANGE UP
GLUCOSE BLDV-MCNC: 94 MG/DL — SIGNIFICANT CHANGE UP (ref 70–99)
GLUCOSE SERPL-MCNC: 85 MG/DL — SIGNIFICANT CHANGE UP (ref 70–99)
GLUCOSE SERPL-MCNC: 92 MG/DL — SIGNIFICANT CHANGE UP (ref 70–99)
HCO3 BLDV-SCNC: 35 MMOL/L — HIGH (ref 22–29)
HCT VFR BLD CALC: 39.4 % — SIGNIFICANT CHANGE UP (ref 34.5–45)
HCT VFR BLDA CALC: 32 % — LOW (ref 34.5–46.5)
HGB BLD CALC-MCNC: 12.3 G/DL — SIGNIFICANT CHANGE UP (ref 11.7–16.1)
HGB BLD-MCNC: 12.2 G/DL — SIGNIFICANT CHANGE UP (ref 11.5–15.5)
IMM GRANULOCYTES NFR BLD AUTO: 0.4 % — SIGNIFICANT CHANGE UP (ref 0–0.9)
LACTATE BLDV-MCNC: 2.9 MMOL/L — HIGH (ref 0.5–2)
LYMPHOCYTES # BLD AUTO: 1.26 K/UL — SIGNIFICANT CHANGE UP (ref 1–3.3)
LYMPHOCYTES # BLD AUTO: 11.2 % — LOW (ref 13–44)
MAGNESIUM SERPL-MCNC: 1.7 MG/DL — SIGNIFICANT CHANGE UP (ref 1.6–2.6)
MCHC RBC-ENTMCNC: 28 PG — SIGNIFICANT CHANGE UP (ref 27–34)
MCHC RBC-ENTMCNC: 31 GM/DL — LOW (ref 32–36)
MCV RBC AUTO: 90.6 FL — SIGNIFICANT CHANGE UP (ref 80–100)
MONOCYTES # BLD AUTO: 0.74 K/UL — SIGNIFICANT CHANGE UP (ref 0–0.9)
MONOCYTES NFR BLD AUTO: 6.6 % — SIGNIFICANT CHANGE UP (ref 2–14)
NEUTROPHILS # BLD AUTO: 8.8 K/UL — HIGH (ref 1.8–7.4)
NEUTROPHILS NFR BLD AUTO: 78.3 % — HIGH (ref 43–77)
NRBC # BLD: 0 /100 WBCS — SIGNIFICANT CHANGE UP (ref 0–0)
OTHER CELLS CSF MANUAL: 4.8 ML/DL — LOW (ref 18–22)
PCO2 BLDV: 54 MMHG — HIGH (ref 39–42)
PH BLDV: 7.42 — SIGNIFICANT CHANGE UP (ref 7.32–7.43)
PLATELET # BLD AUTO: 219 K/UL — SIGNIFICANT CHANGE UP (ref 150–400)
PO2 BLDV: 24 MMHG — LOW (ref 25–45)
POTASSIUM BLDV-SCNC: 2.3 MMOL/L — CRITICAL LOW (ref 3.5–5.1)
POTASSIUM SERPL-MCNC: 2.4 MMOL/L — CRITICAL LOW (ref 3.5–5.3)
POTASSIUM SERPL-MCNC: 2.7 MMOL/L — CRITICAL LOW (ref 3.5–5.3)
POTASSIUM SERPL-MCNC: 4 MMOL/L — SIGNIFICANT CHANGE UP (ref 3.5–5.3)
POTASSIUM SERPL-SCNC: 2.4 MMOL/L — CRITICAL LOW (ref 3.5–5.3)
POTASSIUM SERPL-SCNC: 2.7 MMOL/L — CRITICAL LOW (ref 3.5–5.3)
POTASSIUM SERPL-SCNC: 4 MMOL/L — SIGNIFICANT CHANGE UP (ref 3.5–5.3)
PROT SERPL-MCNC: 5.4 G/DL — LOW (ref 6–8.3)
RAPID RVP RESULT: SIGNIFICANT CHANGE UP
RBC # BLD: 4.35 M/UL — SIGNIFICANT CHANGE UP (ref 3.8–5.2)
RBC # FLD: 19 % — HIGH (ref 10.3–14.5)
SAO2 % BLDV: 28.1 % — LOW (ref 67–88)
SARS-COV-2 RNA SPEC QL NAA+PROBE: SIGNIFICANT CHANGE UP
SODIUM SERPL-SCNC: 144 MMOL/L — SIGNIFICANT CHANGE UP (ref 135–145)
SODIUM SERPL-SCNC: 146 MMOL/L — HIGH (ref 135–145)
WBC # BLD: 11.25 K/UL — HIGH (ref 3.8–10.5)
WBC # FLD AUTO: 11.25 K/UL — HIGH (ref 3.8–10.5)

## 2023-10-13 PROCEDURE — 80048 BASIC METABOLIC PNL TOTAL CA: CPT

## 2023-10-13 PROCEDURE — 71045 X-RAY EXAM CHEST 1 VIEW: CPT

## 2023-10-13 PROCEDURE — 82947 ASSAY GLUCOSE BLOOD QUANT: CPT

## 2023-10-13 PROCEDURE — 83605 ASSAY OF LACTIC ACID: CPT

## 2023-10-13 PROCEDURE — 84484 ASSAY OF TROPONIN QUANT: CPT

## 2023-10-13 PROCEDURE — 93005 ELECTROCARDIOGRAM TRACING: CPT

## 2023-10-13 PROCEDURE — 71045 X-RAY EXAM CHEST 1 VIEW: CPT | Mod: 26

## 2023-10-13 PROCEDURE — 82435 ASSAY OF BLOOD CHLORIDE: CPT

## 2023-10-13 PROCEDURE — 96365 THER/PROPH/DIAG IV INF INIT: CPT | Mod: XU

## 2023-10-13 PROCEDURE — 85025 COMPLETE CBC W/AUTO DIFF WBC: CPT

## 2023-10-13 PROCEDURE — 82803 BLOOD GASES ANY COMBINATION: CPT

## 2023-10-13 PROCEDURE — 85014 HEMATOCRIT: CPT

## 2023-10-13 PROCEDURE — 96376 TX/PRO/DX INJ SAME DRUG ADON: CPT | Mod: XU

## 2023-10-13 PROCEDURE — 36415 COLL VENOUS BLD VENIPUNCTURE: CPT

## 2023-10-13 PROCEDURE — 96367 TX/PROPH/DG ADDL SEQ IV INF: CPT

## 2023-10-13 PROCEDURE — 84132 ASSAY OF SERUM POTASSIUM: CPT

## 2023-10-13 PROCEDURE — 0225U NFCT DS DNA&RNA 21 SARSCOV2: CPT

## 2023-10-13 PROCEDURE — 99285 EMERGENCY DEPT VISIT HI MDM: CPT | Mod: GC

## 2023-10-13 PROCEDURE — 85018 HEMOGLOBIN: CPT

## 2023-10-13 PROCEDURE — 83735 ASSAY OF MAGNESIUM: CPT

## 2023-10-13 PROCEDURE — 96366 THER/PROPH/DIAG IV INF ADDON: CPT

## 2023-10-13 PROCEDURE — 80053 COMPREHEN METABOLIC PANEL: CPT

## 2023-10-13 PROCEDURE — 71275 CT ANGIOGRAPHY CHEST: CPT | Mod: MA

## 2023-10-13 PROCEDURE — 71275 CT ANGIOGRAPHY CHEST: CPT | Mod: 26,MA

## 2023-10-13 PROCEDURE — 82330 ASSAY OF CALCIUM: CPT

## 2023-10-13 PROCEDURE — 99285 EMERGENCY DEPT VISIT HI MDM: CPT | Mod: 25

## 2023-10-13 PROCEDURE — 94640 AIRWAY INHALATION TREATMENT: CPT

## 2023-10-13 PROCEDURE — 84295 ASSAY OF SERUM SODIUM: CPT

## 2023-10-13 PROCEDURE — 83880 ASSAY OF NATRIURETIC PEPTIDE: CPT

## 2023-10-13 RX ORDER — POTASSIUM CHLORIDE 20 MEQ
10 PACKET (EA) ORAL
Refills: 0 | Status: COMPLETED | OUTPATIENT
Start: 2023-10-13 | End: 2023-10-13

## 2023-10-13 RX ORDER — MAGNESIUM SULFATE 500 MG/ML
1 VIAL (ML) INJECTION ONCE
Refills: 0 | Status: COMPLETED | OUTPATIENT
Start: 2023-10-13 | End: 2023-10-13

## 2023-10-13 RX ORDER — IPRATROPIUM/ALBUTEROL SULFATE 18-103MCG
3 AEROSOL WITH ADAPTER (GRAM) INHALATION ONCE
Refills: 0 | Status: COMPLETED | OUTPATIENT
Start: 2023-10-13 | End: 2023-10-13

## 2023-10-13 RX ORDER — POTASSIUM CHLORIDE 20 MEQ
40 PACKET (EA) ORAL ONCE
Refills: 0 | Status: COMPLETED | OUTPATIENT
Start: 2023-10-13 | End: 2023-10-13

## 2023-10-13 RX ORDER — CEFEPIME 1 G/1
2000 INJECTION, POWDER, FOR SOLUTION INTRAMUSCULAR; INTRAVENOUS ONCE
Refills: 0 | Status: COMPLETED | OUTPATIENT
Start: 2023-10-13 | End: 2023-10-13

## 2023-10-13 RX ADMIN — Medication 100 MILLIEQUIVALENT(S): at 10:34

## 2023-10-13 RX ADMIN — Medication 100 MILLIEQUIVALENT(S): at 16:20

## 2023-10-13 RX ADMIN — Medication 100 GRAM(S): at 06:38

## 2023-10-13 RX ADMIN — Medication 10 MILLIEQUIVALENT(S): at 09:20

## 2023-10-13 RX ADMIN — Medication 100 MILLIEQUIVALENT(S): at 17:44

## 2023-10-13 RX ADMIN — Medication 100 MILLIEQUIVALENT(S): at 18:43

## 2023-10-13 RX ADMIN — Medication 1 GRAM(S): at 08:00

## 2023-10-13 RX ADMIN — Medication 10 MILLIEQUIVALENT(S): at 11:25

## 2023-10-13 RX ADMIN — CEFEPIME 2000 MILLIGRAM(S): 1 INJECTION, POWDER, FOR SOLUTION INTRAMUSCULAR; INTRAVENOUS at 10:05

## 2023-10-13 RX ADMIN — CEFEPIME 100 MILLIGRAM(S): 1 INJECTION, POWDER, FOR SOLUTION INTRAMUSCULAR; INTRAVENOUS at 09:32

## 2023-10-13 RX ADMIN — Medication 10 MILLIEQUIVALENT(S): at 17:20

## 2023-10-13 RX ADMIN — Medication 3 MILLILITER(S): at 06:38

## 2023-10-13 RX ADMIN — Medication 100 MILLIEQUIVALENT(S): at 09:29

## 2023-10-13 RX ADMIN — Medication 100 MILLIEQUIVALENT(S): at 08:10

## 2023-10-13 NOTE — ED PROVIDER NOTE - PATIENT PORTAL LINK FT
You can access the FollowMyHealth Patient Portal offered by [facility] by registering at the following website: [email or web address]. By joining [employer]’s FollowMyHealth portal, you will also be able to view your health information using other applications (apps) compatible with our system.

## 2023-10-13 NOTE — ED PROVIDER NOTE - ATTENDING CONTRIBUTION TO CARE
I have personally performed a face to face medical and diagnostic evaluation of the patient. I have discussed with and reviewed the Resident's note and agree with the History, ROS, Physical Exam and MDM unless otherwise indicated. A brief summary of my personal evaluation and impression can be found below.    89yof referred by PCP to ED for hypokalemia 2.4. Likely from daily lasix, not compliant with potassium pills. Pt A&Ox2 at this time, collateral per two daughters.  Has been mostly bed bound this past yr. Occasional episodes of SOB noticed and increased coughing. On exam - Hypoxic on arrival with exp wheeze b/l.  Will repeat labs and get EKG. Give IV Mg 1g, PO K 40 with IV K to follow once level results.  Will give duoneb and get CXR to eval for pulmonary edema. Possible viral illness. Given ambulatory status plan for CTA r/o PE vs pna vs other pulm etiology. Reasses to dispo. Best Pierre, ED Attending

## 2023-10-13 NOTE — CHART NOTE - NSCHARTNOTEFT_GEN_A_CORE
Case discussed with ED physician.   Patient with hypoxia, likely new cancer based on imaging.  Per their conversation with family, they would like to take patient home.  If O2 cannot be set up, would recommend admission. Alternatively, can discuss with SW if family interested in hospice approach and if [facility] can assist with DME in expedited fashion. If admitted, palliative can try to provide consultation and guidance over weekend.  [phone]
LMSW received an request for assistance with discharge planning and support. Chart was reviewed. Per chart review "89yo F w/ PMHx of [place], macular degeneration, mci/dementia, ?meningiomas, HTN, HLD, CVA, PUD, colon ca s/p resection sent to ED by PCP for K 2.4 from routine lab yesterday."  At baseline patient is bedbound. LMSW introduced herself to daughter at bedside. Daughter reports patient becomes more agitated if she is admitted.  Daughter is requesting the patient be discharged home. Patient now requires oxygen continuously.  Patient is currently on 4LPM. Attending MD completed the required prescription and documentation which was sent over to [facility] .  LMSW contacted [facility] to inquire about delivery. Oxygen Home[employer] system was delivered at bedside to daughter.  Daughter met with Melanie Banks from [facility] where Melanie Banks provided bed side teaching instructions.  Daughter  was pleased and agreed to the discharge. Non-emergent form completed. [facility] to transport the patient home.  No further concerns voiced.  LMSW will follow up as needed.

## 2023-10-13 NOTE — ED PROVIDER NOTE - OBJECTIVE STATEMENT
89yo F w/ PMHx of [place], macular degeneration, mci/dementia, ?meningiomas, HTN, HLD, CVA, PUD, colon ca s/p resection sent to ED by PCP for K 2.4 from routine lab yesterday. Pt currently has no complaints, states she feels fine. Denies weakness. Two daughters at bedside provided most hx as pt hard of hearing and did not wear her hearing aid today. Daughter reports pt has been taking lasix 20mg but not the prescribed potassium pill as it is too big to swallow. Has had prior episode of hypokalemia requiring IV supplementation. Was called by PCP at 3am for low K and requests to go to ED asap. Daughter states pt baseline non-ambulatory, mentation has been deteriorating sine june/july w/ disorientation to place but currently at baseline.

## 2023-10-13 NOTE — ED ADULT NURSE REASSESSMENT NOTE - NS ED NURSE REASSESS COMMENT FT1
1500-paient's daughter insisted to feed patient at this time. Spoke to MD & ok to eat. Assisted daughter to sit patient upright. Tolerated some fruit sauce, & drank carbonated soda. No cough or dripping noted. Incontinent of urine. Diaper changed. SKin care rendered, Repositioned to sides a tolerated.   1620- K+ level 2.7. 3 potassium riders ordered & ist bag started & infusing to right hand. Daughter made aware. Still awaiting for disposition.
0720-patient received in Gold 1L. As receiving report from the previous shift, 2 daughters at the bedside complaining why patient is in a 2 bedroom. Transferred to room 12, limited space to move around. Family refused to leave the room, although explained the 1 visitor policy. Charge made aware.   0745-gowned up. Daughters refused to have purewick applied. Noted with redness to sacral & inner buttocks areas. No open skin noted. Daughter made aware. Repositioned to sides & kept dry. Maintained on 2 liters NC & with o2 saturation at 96%. On RA with oxygen 89%. MD at bedside. HOB placed on 35deg as tolerated.   0748-New IV access g18 inserted to right ac with good blood return.

## 2023-10-13 NOTE — ED ADULT NURSE NOTE - NSFALLHARMRISKINTERV_ED_ALL_ED
Assistance OOB with selected safe patient handling equipment if applicable/Assistance with ambulation/Communicate risk of Fall with Harm to all staff, patient, and family/Monitor gait and stability/Monitor for mental status changes and reorient to person, place, and time, as needed/Move patient closer to nursing station/within visual sight of ED staff/Provide patient with walking aids/Provide visual cue: red socks, yellow wristband, yellow gown, etc/Reinforce activity limits and safety measures with patient and family/Toileting schedule using arm’s reach rule for commode and bathroom/Use of alarms - bed, stretcher, chair and/or video monitoring/Bed in lowest position, wheels locked, appropriate side rails in place/Call bell, personal items and telephone in reach/Instruct patient to call for assistance before getting out of bed/chair/stretcher/Non-slip footwear applied when patient is off stretcher/[place] to call system/Physically safe environment - no spills, clutter or unnecessary equipment/Purposeful Proactive Rounding/Room/bathroom lighting operational, light cord in reach

## 2023-10-13 NOTE — ED PROVIDER NOTE - NSFOLLOWUPINSTRUCTIONS_ED_ALL_ED_FT
You were seen in the ED today for low potassium and low oxygen.    Your work up included labs and catscans. Your results are included in your paperwork.    Your symptoms are likely due to lung mass.    Please follow up with your PCP within the next 1 week.     You were sent home with home oxygen, please use 2L NC at all times.     If you experience any of the following please return to the ED:  - Chest pain  - Trouble breathing  - New or worsening pain  - Fevers or chills  - Blood in stool

## 2023-10-13 NOTE — ED PROVIDER NOTE - CARE PLAN
1 Principal Discharge DX:	Hypokalemia   Principal Discharge DX:	Hypokalemia  Secondary Diagnosis:	Acute respiratory failure with hypoxia  Secondary Diagnosis:	Pleural effusion

## 2023-10-13 NOTE — ED ADULT NURSE NOTE - OBJECTIVE STATEMENT
Pt is a 89 y/o female with PMH dementia, HTN, HLD presenting to the ED c/o abnormal lab result. History obtained from patient's daughters at bedside reporting pt had routine blood drawn yesterday and were notified this morning of hypokalemia 2.4 and instructed to come to ED. Family states patient takes furosemide, is supposed to be on oral potassium at home but does not take the potassium due to "large pill size." Pt arrived to [facility] at baseline mental status,  A&Ox2, following commands, speech is clear, moving all extremities. Pt denies pain/discomfort, not answering all RN assessment questions at this time. Pt noted to be hypoxic on room air on arrival to 89%, placed on 4L O2 with improvement of O2 sat to normal limits.

## 2023-10-13 NOTE — ED PROVIDER NOTE - CLINICAL SUMMARY MEDICAL DECISION MAKING FREE TEXT BOX
89yof referred by PCP to ED for hypokalemia 2.4. Likely from daily lasix, not compliant with potassium pills. Will repeat labs and get EKG. Give Mg 1g, with IV K to follow once level results. Hypoxic on arrival with mild exp wheeze. Will give duoneb and get CXR to eval for pulmonary edema. Consider CTA for PE if consistently hypoxic after off O2.     Dispo: likely home after K corrects + neg imaging 89yof referred by PCP to ED for hypokalemia 2.4. Likely from daily lasix, not compliant with potassium pills. Will repeat labs and get EKG. Give IV Mg 1g, PO K 40 with IV K to follow once level results. Hypoxic on arrival with mild exp wheeze. Will give duoneb and get CXR to eval for pulmonary edema. Consider CTA for PE if consistently hypoxic after off O2.     Dispo: likely home after K corrects + neg imaging 89yof referred by PCP to ED for hypokalemia 2.4. Likely from daily lasix, not compliant with potassium pills. Will repeat labs and get EKG. Give IV Mg 1g, PO K 40 with IV K to follow once level results. Hypoxic on arrival with mild exp wheeze. Will give duoneb and get CXR to eval for pulmonary edema. CTA r/o PE.     Dispo: likely home after K corrects + neg imaging

## 2023-10-13 NOTE — ED PROVIDER NOTE - PROGRESS NOTE ADDITIONAL2
EPWORTH SLEEPINESS SCALE KEY:     Scale:   0=No chance of dozing  1=Slight chance of dozing  2=Moderate chance of dozing  3=High chance of dozing    EPWORTH SCALE  Sitting and reading 1  Watching TV 1  Sitting inactive in a public place 0  As a passenger in a car for one hour without a break 0  Lying down to rest in the afternoon when circumstances permit 2  Sitting and talking to someone 0  Sitting quietly after lunch without alcohol 2  In a car, while stopped for a few minutes in traffic 0  TOTAL 6    Score 1-6: Good sleep  Score 7-8: Average sleep  Score 9 and up: Seek advice of a sleep specialist without delay.                                                                                                         Additional Progress Note...

## 2023-10-13 NOTE — ED PROVIDER NOTE - PROGRESS NOTE DETAILS
Attending MD Castillo: Reassessed patient.  Patient sleeping, no apparent distress.  Remains on supplemental oxygen.  Explained current medical issues to daughter at bedside including severe hypokalemia hypoxic respiratory failure as well as opacities in the right lung.  Patient's daughter is very interested in patient being released today, I explained my hesitation as to this being medically feasible given patient's severe hypokalemia and hypoxic respiratory failure.  Will obtain CTA to further investigate etiology of pulmonary opacities, will recheck chemistry after IV potassium repletions.  I started to explore patient's goals of care.  Explained what palliative/comfort care would entail however patient's daughter is not certain at this time that this would be an patient's goals of care yet.  CT may help with prognosis so we will revisit goals of care after CAT scan. Attending MD Castillo: Reviewed CT findings with patient's daughter at bedside.  She is going to speak with her family members which includes her son who is a physician.  I spoke with Kirill, her son at length about CT findings and lab work results. Manuela Manriquez PGY2: The patient was seen today for her diagnosis of lung mass. While in chronic and stable state my patient is still hypoxic due to her baseline lung mass. SpO2 88% while at rest on room air. With 2 LPM via NC SPO2 at rest improved to 95%. patient will require oxygen 24 hours. Attending MD Castillo: Discussed case at length with patient's daughter, Tana Guaman, CT findings explained at length.  Primary concern would be for metastatic cancer.  Patient hypoxic and requiring oxygen at this time.  Severe hypokalemia improving but repeat potassium is still low at 2.7.  Tana identifies as patient's healthcare proxy in addition to her sister.  Tana's primary goal is for us to avoid hospitalization at all costs today given her mother does very poorly in the hospital with delirium.  Tana is aware of patient's very guarded status and possibility for decompensation if released from hospital.  Offered admission to the hospital for palliative care consultation to arrange for possible comfort measures or hospice referral, Tana prefers patient come home today if possible.  We are attempting to arrange home oxygen for patient.  We will continue additional IV potassium repletion while we are waiting for status of home oxygen arrangements. Manuela Manriquez PGY2: Social work able to get home O2 set up for patient. Pt TBDC once O2 is brought to bedside. Pending nonemergent transport home.

## 2023-10-13 NOTE — ED PROVIDER NOTE - PHYSICAL EXAMINATION
Vital signs reviewed.  CONSTITUTIONAL: NAD, awake, pale appearing   HEAD: Normocephalic; atraumatic  EYES: EOMI, no conjunctival injection, no scleral icterus  MOUTH/THROAT:  MMM  NECK: Trachea midline  CV: Normal S1, S2; no audible murmurs; extremities WWP  RESP: normal work of breathing; mild respiratory wheezing, more on R side   ABD: soft, non-distended; non-tender  : Deferred  MSK/EXT: 2+ LE pitting edema to knee b/l, pulse palpable in all extremities   SKIN: moderate to large ecchymosis in b/l LE R>L  NEURO: Moves all extremities spontaneously with no focal deficits, speech is appropriate

## 2023-10-20 PROBLEM — U07.1 COVID-19: Status: RESOLVED | Noted: 2023-08-09 | Resolved: 2023-10-20

## 2023-10-20 PROBLEM — K27.9 PUD (PEPTIC ULCER DISEASE): Status: ACTIVE | Noted: 2023-08-20

## 2023-10-20 PROBLEM — F03.90 DEMENTIA: Status: ACTIVE | Noted: 2023-08-20

## 2023-10-20 PROBLEM — I10 HTN (HYPERTENSION): Status: ACTIVE | Noted: 2023-08-20

## 2023-10-20 PROBLEM — E78.5 HLD (HYPERLIPIDEMIA): Status: ACTIVE | Noted: 2023-08-20

## 2023-10-20 PROBLEM — H35.30 MACULAR DEGENERATION: Status: ACTIVE | Noted: 2023-08-20

## 2023-10-20 RX ORDER — CYANOCOBALAMIN 1000 UG/ML
1000 INJECTION INTRAMUSCULAR; SUBCUTANEOUS
Qty: 1 | Refills: 0 | Status: COMPLETED | OUTPATIENT
Start: 2023-10-20

## 2023-10-20 RX ADMIN — CYANOCOBALAMIN 1 MCG/ML: 1000 INJECTION INTRAMUSCULAR; SUBCUTANEOUS at 00:00
